# Patient Record
Sex: MALE | Race: WHITE | NOT HISPANIC OR LATINO
[De-identification: names, ages, dates, MRNs, and addresses within clinical notes are randomized per-mention and may not be internally consistent; named-entity substitution may affect disease eponyms.]

---

## 2019-02-11 PROBLEM — Z00.00 ENCOUNTER FOR PREVENTIVE HEALTH EXAMINATION: Status: ACTIVE | Noted: 2019-02-11

## 2019-02-13 ENCOUNTER — APPOINTMENT (OUTPATIENT)
Dept: GASTROENTEROLOGY | Facility: CLINIC | Age: 54
End: 2019-02-13

## 2019-03-04 ENCOUNTER — APPOINTMENT (OUTPATIENT)
Dept: CT IMAGING | Facility: CLINIC | Age: 54
End: 2019-03-04

## 2019-03-04 ENCOUNTER — OUTPATIENT (OUTPATIENT)
Dept: OUTPATIENT SERVICES | Facility: HOSPITAL | Age: 54
LOS: 1 days | End: 2019-03-04
Payer: MEDICARE

## 2019-03-04 DIAGNOSIS — S98.111A COMPLETE TRAUMATIC AMPUTATION OF RIGHT GREAT TOE, INITIAL ENCOUNTER: Chronic | ICD-10-CM

## 2019-03-04 DIAGNOSIS — Z00.8 ENCOUNTER FOR OTHER GENERAL EXAMINATION: ICD-10-CM

## 2019-03-04 DIAGNOSIS — S98.132A COMPLETE TRAUMATIC AMPUTATION OF ONE LEFT LESSER TOE, INITIAL ENCOUNTER: Chronic | ICD-10-CM

## 2019-03-04 PROCEDURE — 74178 CT ABD&PLV WO CNTR FLWD CNTR: CPT

## 2019-03-04 PROCEDURE — 82565 ASSAY OF CREATININE: CPT

## 2019-03-04 PROCEDURE — 74178 CT ABD&PLV WO CNTR FLWD CNTR: CPT | Mod: 26

## 2019-07-31 ENCOUNTER — INPATIENT (INPATIENT)
Facility: HOSPITAL | Age: 54
LOS: 12 days | Discharge: HOME | DRG: 853 | End: 2019-08-13
Attending: HOSPITALIST | Admitting: HOSPITALIST
Payer: MEDICARE

## 2019-07-31 VITALS
HEART RATE: 150 BPM | TEMPERATURE: 100 F | SYSTOLIC BLOOD PRESSURE: 86 MMHG | RESPIRATION RATE: 20 BRPM | OXYGEN SATURATION: 99 % | WEIGHT: 190.04 LBS | DIASTOLIC BLOOD PRESSURE: 54 MMHG | HEIGHT: 71 IN

## 2019-07-31 DIAGNOSIS — S98.111A COMPLETE TRAUMATIC AMPUTATION OF RIGHT GREAT TOE, INITIAL ENCOUNTER: Chronic | ICD-10-CM

## 2019-07-31 DIAGNOSIS — E11.610 TYPE 2 DIABETES MELLITUS WITH DIABETIC NEUROPATHIC ARTHROPATHY: ICD-10-CM

## 2019-07-31 DIAGNOSIS — S98.132A COMPLETE TRAUMATIC AMPUTATION OF ONE LEFT LESSER TOE, INITIAL ENCOUNTER: Chronic | ICD-10-CM

## 2019-07-31 LAB
ALBUMIN SERPL ELPH-MCNC: 2 G/DL — LOW (ref 3.3–5.2)
ALP SERPL-CCNC: 305 U/L — HIGH (ref 40–120)
ALT FLD-CCNC: 13 U/L — SIGNIFICANT CHANGE UP
ANION GAP SERPL CALC-SCNC: 17 MMOL/L — SIGNIFICANT CHANGE UP (ref 5–17)
ANISOCYTOSIS BLD QL: SLIGHT — SIGNIFICANT CHANGE UP
APTT BLD: 31.6 SEC — SIGNIFICANT CHANGE UP (ref 27.5–36.3)
AST SERPL-CCNC: 22 U/L — SIGNIFICANT CHANGE UP
BASOPHILS # BLD AUTO: 0 K/UL — SIGNIFICANT CHANGE UP (ref 0–0.2)
BASOPHILS NFR BLD AUTO: 0 % — SIGNIFICANT CHANGE UP (ref 0–2)
BILIRUB SERPL-MCNC: 0.6 MG/DL — SIGNIFICANT CHANGE UP (ref 0.4–2)
BLD GP AB SCN SERPL QL: SIGNIFICANT CHANGE UP
BUN SERPL-MCNC: 110 MG/DL — HIGH (ref 8–20)
CALCIUM SERPL-MCNC: 8.9 MG/DL — SIGNIFICANT CHANGE UP (ref 8.6–10.2)
CHLORIDE SERPL-SCNC: 88 MMOL/L — LOW (ref 98–107)
CO2 SERPL-SCNC: 18 MMOL/L — LOW (ref 22–29)
CREAT SERPL-MCNC: 2.34 MG/DL — HIGH (ref 0.5–1.3)
EOSINOPHIL # BLD AUTO: 0.19 K/UL — SIGNIFICANT CHANGE UP (ref 0–0.5)
EOSINOPHIL NFR BLD AUTO: 0.9 % — SIGNIFICANT CHANGE UP (ref 0–6)
GIANT PLATELETS BLD QL SMEAR: PRESENT — SIGNIFICANT CHANGE UP
GLUCOSE SERPL-MCNC: 127 MG/DL — HIGH (ref 70–115)
HCT VFR BLD CALC: 27.4 % — LOW (ref 39–50)
HGB BLD-MCNC: 8.3 G/DL — LOW (ref 13–17)
INR BLD: 1.21 RATIO — HIGH (ref 0.88–1.16)
LACTATE BLDV-MCNC: 2.1 MMOL/L — HIGH (ref 0.5–2)
LYMPHOCYTES # BLD AUTO: 0.73 K/UL — LOW (ref 1–3.3)
LYMPHOCYTES # BLD AUTO: 3.5 % — LOW (ref 13–44)
MANUAL SMEAR VERIFICATION: SIGNIFICANT CHANGE UP
MCHC RBC-ENTMCNC: 24.7 PG — LOW (ref 27–34)
MCHC RBC-ENTMCNC: 30.3 GM/DL — LOW (ref 32–36)
MCV RBC AUTO: 81.5 FL — SIGNIFICANT CHANGE UP (ref 80–100)
MONOCYTES # BLD AUTO: 1.44 K/UL — HIGH (ref 0–0.9)
MONOCYTES NFR BLD AUTO: 6.9 % — SIGNIFICANT CHANGE UP (ref 2–14)
NEUTROPHILS # BLD AUTO: 18.55 K/UL — HIGH (ref 1.8–7.4)
NEUTROPHILS NFR BLD AUTO: 88.7 % — HIGH (ref 43–77)
PLAT MORPH BLD: NORMAL — SIGNIFICANT CHANGE UP
PLATELET # BLD AUTO: 410 K/UL — HIGH (ref 150–400)
POLYCHROMASIA BLD QL SMEAR: SLIGHT — SIGNIFICANT CHANGE UP
POTASSIUM SERPL-MCNC: 5.6 MMOL/L — HIGH (ref 3.5–5.3)
POTASSIUM SERPL-MCNC: 7 MMOL/L — CRITICAL HIGH (ref 3.5–5.3)
POTASSIUM SERPL-SCNC: 5.6 MMOL/L — HIGH (ref 3.5–5.3)
POTASSIUM SERPL-SCNC: 7 MMOL/L — CRITICAL HIGH (ref 3.5–5.3)
PROT SERPL-MCNC: 7.4 G/DL — SIGNIFICANT CHANGE UP (ref 6.6–8.7)
PROTHROM AB SERPL-ACNC: 14 SEC — HIGH (ref 10–12.9)
RBC # BLD: 3.36 M/UL — LOW (ref 4.2–5.8)
RBC # FLD: 17 % — HIGH (ref 10.3–14.5)
RBC BLD AUTO: ABNORMAL
SODIUM SERPL-SCNC: 123 MMOL/L — LOW (ref 135–145)
WBC # BLD: 20.91 K/UL — HIGH (ref 3.8–10.5)
WBC # FLD AUTO: 20.91 K/UL — HIGH (ref 3.8–10.5)

## 2019-07-31 PROCEDURE — 93010 ELECTROCARDIOGRAM REPORT: CPT

## 2019-07-31 PROCEDURE — 73610 X-RAY EXAM OF ANKLE: CPT | Mod: 26,RT

## 2019-07-31 PROCEDURE — 73630 X-RAY EXAM OF FOOT: CPT | Mod: 26,RT

## 2019-07-31 PROCEDURE — 99223 1ST HOSP IP/OBS HIGH 75: CPT

## 2019-07-31 PROCEDURE — 99291 CRITICAL CARE FIRST HOUR: CPT

## 2019-07-31 PROCEDURE — 71045 X-RAY EXAM CHEST 1 VIEW: CPT | Mod: 26

## 2019-07-31 RX ORDER — VANCOMYCIN HCL 1 G
1000 VIAL (EA) INTRAVENOUS ONCE
Refills: 0 | Status: COMPLETED | OUTPATIENT
Start: 2019-07-31 | End: 2019-07-31

## 2019-07-31 RX ORDER — DEXTROSE 50 % IN WATER 50 %
25 SYRINGE (ML) INTRAVENOUS ONCE
Refills: 0 | Status: DISCONTINUED | OUTPATIENT
Start: 2019-07-31 | End: 2019-08-01

## 2019-07-31 RX ORDER — ZOLPIDEM TARTRATE 10 MG/1
5 TABLET ORAL AT BEDTIME
Refills: 0 | Status: DISCONTINUED | OUTPATIENT
Start: 2019-07-31 | End: 2019-08-01

## 2019-07-31 RX ORDER — DEXTROSE 50 % IN WATER 50 %
50 SYRINGE (ML) INTRAVENOUS ONCE
Refills: 0 | Status: COMPLETED | OUTPATIENT
Start: 2019-07-31 | End: 2019-07-31

## 2019-07-31 RX ORDER — INSULIN HUMAN 100 [IU]/ML
6 INJECTION, SOLUTION SUBCUTANEOUS ONCE
Refills: 0 | Status: COMPLETED | OUTPATIENT
Start: 2019-07-31 | End: 2019-07-31

## 2019-07-31 RX ORDER — SODIUM HYPOCHLORITE 0.125 %
1 SOLUTION, NON-ORAL MISCELLANEOUS ONCE
Refills: 0 | Status: COMPLETED | OUTPATIENT
Start: 2019-07-31 | End: 2019-07-31

## 2019-07-31 RX ORDER — PIPERACILLIN AND TAZOBACTAM 4; .5 G/20ML; G/20ML
3.38 INJECTION, POWDER, LYOPHILIZED, FOR SOLUTION INTRAVENOUS EVERY 8 HOURS
Refills: 0 | Status: DISCONTINUED | OUTPATIENT
Start: 2019-07-31 | End: 2019-08-01

## 2019-07-31 RX ORDER — INSULIN LISPRO 100/ML
VIAL (ML) SUBCUTANEOUS AT BEDTIME
Refills: 0 | Status: DISCONTINUED | OUTPATIENT
Start: 2019-07-31 | End: 2019-08-01

## 2019-07-31 RX ORDER — SODIUM CHLORIDE 9 MG/ML
1000 INJECTION, SOLUTION INTRAVENOUS
Refills: 0 | Status: DISCONTINUED | OUTPATIENT
Start: 2019-07-31 | End: 2019-08-01

## 2019-07-31 RX ORDER — PIPERACILLIN AND TAZOBACTAM 4; .5 G/20ML; G/20ML
3.38 INJECTION, POWDER, LYOPHILIZED, FOR SOLUTION INTRAVENOUS ONCE
Refills: 0 | Status: COMPLETED | OUTPATIENT
Start: 2019-07-31 | End: 2019-07-31

## 2019-07-31 RX ORDER — VANCOMYCIN HCL 1 G
1000 VIAL (EA) INTRAVENOUS EVERY 24 HOURS
Refills: 0 | Status: DISCONTINUED | OUTPATIENT
Start: 2019-07-31 | End: 2019-08-01

## 2019-07-31 RX ORDER — FENTANYL CITRATE 50 UG/ML
25 INJECTION INTRAVENOUS ONCE
Refills: 0 | Status: DISCONTINUED | OUTPATIENT
Start: 2019-07-31 | End: 2019-08-01

## 2019-07-31 RX ORDER — FOLIC ACID 0.8 MG
1 TABLET ORAL DAILY
Refills: 0 | Status: DISCONTINUED | OUTPATIENT
Start: 2019-07-31 | End: 2019-08-01

## 2019-07-31 RX ORDER — SODIUM CHLORIDE 9 MG/ML
2700 INJECTION INTRAMUSCULAR; INTRAVENOUS; SUBCUTANEOUS ONCE
Refills: 0 | Status: COMPLETED | OUTPATIENT
Start: 2019-07-31 | End: 2019-07-31

## 2019-07-31 RX ORDER — ATORVASTATIN CALCIUM 80 MG/1
40 TABLET, FILM COATED ORAL AT BEDTIME
Refills: 0 | Status: DISCONTINUED | OUTPATIENT
Start: 2019-07-31 | End: 2019-08-01

## 2019-07-31 RX ORDER — INSULIN LISPRO 100/ML
VIAL (ML) SUBCUTANEOUS
Refills: 0 | Status: DISCONTINUED | OUTPATIENT
Start: 2019-07-31 | End: 2019-08-01

## 2019-07-31 RX ORDER — SODIUM CHLORIDE 9 MG/ML
1000 INJECTION INTRAMUSCULAR; INTRAVENOUS; SUBCUTANEOUS ONCE
Refills: 0 | Status: COMPLETED | OUTPATIENT
Start: 2019-07-31 | End: 2019-07-31

## 2019-07-31 RX ORDER — PIPERACILLIN AND TAZOBACTAM 4; .5 G/20ML; G/20ML
3.38 INJECTION, POWDER, LYOPHILIZED, FOR SOLUTION INTRAVENOUS ONCE
Refills: 0 | Status: DISCONTINUED | OUTPATIENT
Start: 2019-07-31 | End: 2019-07-31

## 2019-07-31 RX ORDER — ACETAMINOPHEN 500 MG
975 TABLET ORAL ONCE
Refills: 0 | Status: COMPLETED | OUTPATIENT
Start: 2019-07-31 | End: 2019-07-31

## 2019-07-31 RX ORDER — GLUCAGON INJECTION, SOLUTION 0.5 MG/.1ML
1 INJECTION, SOLUTION SUBCUTANEOUS ONCE
Refills: 0 | Status: DISCONTINUED | OUTPATIENT
Start: 2019-07-31 | End: 2019-08-01

## 2019-07-31 RX ORDER — DEXTROSE 50 % IN WATER 50 %
12.5 SYRINGE (ML) INTRAVENOUS ONCE
Refills: 0 | Status: DISCONTINUED | OUTPATIENT
Start: 2019-07-31 | End: 2019-08-01

## 2019-07-31 RX ORDER — INSULIN GLARGINE 100 [IU]/ML
15 INJECTION, SOLUTION SUBCUTANEOUS AT BEDTIME
Refills: 0 | Status: DISCONTINUED | OUTPATIENT
Start: 2019-07-31 | End: 2019-08-01

## 2019-07-31 RX ORDER — DULOXETINE HYDROCHLORIDE 30 MG/1
20 CAPSULE, DELAYED RELEASE ORAL
Refills: 0 | Status: DISCONTINUED | OUTPATIENT
Start: 2019-07-31 | End: 2019-08-01

## 2019-07-31 RX ORDER — DEXTROSE 50 % IN WATER 50 %
15 SYRINGE (ML) INTRAVENOUS ONCE
Refills: 0 | Status: DISCONTINUED | OUTPATIENT
Start: 2019-07-31 | End: 2019-08-01

## 2019-07-31 RX ORDER — THIAMINE MONONITRATE (VIT B1) 100 MG
100 TABLET ORAL DAILY
Refills: 0 | Status: DISCONTINUED | OUTPATIENT
Start: 2019-07-31 | End: 2019-08-01

## 2019-07-31 RX ADMIN — Medication 1 APPLICATION(S): at 21:10

## 2019-07-31 RX ADMIN — SODIUM CHLORIDE 2700 MILLILITER(S): 9 INJECTION INTRAMUSCULAR; INTRAVENOUS; SUBCUTANEOUS at 19:58

## 2019-07-31 RX ADMIN — PIPERACILLIN AND TAZOBACTAM 200 GRAM(S): 4; .5 INJECTION, POWDER, LYOPHILIZED, FOR SOLUTION INTRAVENOUS at 19:58

## 2019-07-31 NOTE — H&P ADULT - PROBLEM SELECTOR PLAN 3
likely pre-renal in setting of sepsis, now s/p 2.7 L bolus  will check ua, urine lytes  continue to trend

## 2019-07-31 NOTE — H&P ADULT - PROBLEM SELECTOR PLAN 9
hx etoh use and ivdu, however denies recent use  will check drug panel and etoh level  does not appear to have any withdrawal symptoms currently  will treat pain given severity of wound  if etoh level elevated will place on symptom triggered ciwa.

## 2019-07-31 NOTE — H&P ADULT - HISTORY OF PRESENT ILLNESS
54M hx HTN, HLD, DM2, ETOH abuse, IVDU (on suboxone), multiple toe/foot amputations presents with severe foot infection. Patient states that lives on own and hasn't been able to leave house for the last month. He states he's friend with delivery people that bring him his food and meds. He's had multiple foot/toe amputations due to diabetic ulcers. The last one was about two years ago. He's noticed this foot wound brewing for a while, but has not sought medical attention because was concerned that would need amputation and thought could take care of it himself. He states for the last month has not mila able to walk on foot. He states for the past few days he's been having worsening bilateral leg and foot pain and has felt very fatigued. Today his brother came to check on him and upon seeing the foot wound made him call EMS and come to ED.     Pt has history of etoh abuse, but states cut back a lot. He states drinks bottle of champagne a couple times a month and last time being two weeks ago. He also initially denied history of IVDU, however when asked about suboxone subscription he states that he's been on suboxone for last 2-3 years.     Upon arriving to ED, patient was had temp of 99.7, heart rate 150 (improved to 126 s/p ivf bolus), bp 86/54 (improved to 97/54), RR- 22 saturating 95% on room air. Patient was found to have diffuse ulcers of foot and ankle with exposed bones/tendons and infested with maggots. Patient given 2.7 bolus, vanc and zosyn. Podiatry washed out wound.

## 2019-07-31 NOTE — H&P ADULT - NSHPLABSRESULTS_GEN_ALL_CORE
8.3    20.91 )-----------( 410      ( 31 Jul 2019 19:33 )             27.4       07-31    x   |  x   |  x   ----------------------------<  x   5.6<H>   |  x   |  x     Ca    8.9      31 Jul 2019 19:33    TPro  7.4  /  Alb  2.0<L>  /  TBili  0.6  /  DBili  x   /  AST  22  /  ALT  13  /  AlkPhos  305<H>  07-31        PT/INR - ( 31 Jul 2019 19:33 )   PT: 14.0 sec;   INR: 1.21 ratio         PTT - ( 31 Jul 2019 19:33 )  PTT:31.6 sec    Lactate Trend      CARDIAC MARKERS ( 31 Jul 2019 21:07 )  x     / 0.01 ng/mL / 21 U/L / x     / x          CAPILLARY BLOOD GLUCOSE      POCT Blood Glucose.: 135 mg/dL (01 Aug 2019 00:00)      RADIOLOGY, EKG & ADDITIONAL TESTS: Reviewed.   EKG- sinus tachycardia to 126, t wave inversions in v4-6 (appears new from previous in 2016)    CXR- poor inspiratory effort, grossly clear lungs

## 2019-07-31 NOTE — H&P ADULT - NSHPSOCIALHISTORY_GEN_ALL_CORE
former etoh abuse, pt claims cut down a lot 6 months ago and hasn't had drink in 2 weeks  former ivdu on suboxone, states has been clearn for 2-3 years.   Lives alone  unemployed, formerly in construction work

## 2019-07-31 NOTE — CONSULT NOTE ADULT - SUBJECTIVE AND OBJECTIVE BOX
Pt Name: BREANNA MENDOZA    MRN: [id number]      Patient is a 54y Male presenting to the emergency department with right foot gangrene and sepsis. Patient seen and examined at the bedside. Patient states he previously had an ulcer on his right foot which he could not care for the wound and over the course of the past few weeks the right foot wound worsened. Patient is unable to ambulate and reports pain in the RLE. Patient states he has very little movement of the RLE below the knee. Patient admits to fever and chills. Patient denies CP, SOB, dizziness, HA.       REVIEW OF SYSTEMS    General: Alert, responsive    Skin/Breast: +necrosis, +ulcers throughout    Musculoskeletal: SEE HPI.    Neurological: Decreased sensation to distal 2/3 of RLE below knee  Endocrine: Hx of diabetes.    PAST MEDICAL & SURGICAL HISTORY:  PAST MEDICAL & SURGICAL HISTORY:  Hyperlipidemia  HTN (hypertension)  DM (diabetes mellitus)  Amputation of toe, left, traumatic: amputation of all the toes on the left foot  by surgery  Amputation of great toe, right, traumatic: right great toe removed by surgery      Allergies: Allergy Status Unknown      Medications: acetaminophen   Tablet .. 975 milliGRAM(s) Oral once  dextrose 50% Injectable 50 milliLiter(s) IV Push Once  fentaNYL    Injectable 25 MICROGram(s) IV Push once  insulin regular  human recombinant. 6 Unit(s) IV Push once  piperacillin/tazobactam IVPB.. 3.375 Gram(s) IV Intermittent Once  sodium chloride 0.9% Bolus 1000 milliLiter(s) IV Bolus once  vancomycin  IVPB 1000 milliGRAM(s) IV Intermittent once      FAMILY HISTORY:  : non-contributory    Social History:     Ambulation: Walking independently [ ] With Cane [ ] With Walker [ ]  Bedbound [ ]                           8.3    20.91 )-----------( 410      ( 31 Jul 2019 19:33 )             27.4       07-31    x   |  x   |  x   ----------------------------<  x   5.6<H>   |  x   |  x     Ca    8.9      31 Jul 2019 19:33    TPro  7.4  /  Alb  2.0<L>  /  TBili  0.6  /  DBili  x   /  AST  22  /  ALT  13  /  AlkPhos  305<H>  07-31      Vital Signs Last 24 Hrs  T(C): 37.6 (31 Jul 2019 18:47), Max: 37.6 (31 Jul 2019 18:47)  T(F): 99.7 (31 Jul 2019 18:47), Max: 99.7 (31 Jul 2019 18:47)  HR: 122 (31 Jul 2019 23:27) (122 - 150)  BP: 97/54 (31 Jul 2019 19:10) (86/54 - 97/54)  BP(mean): --  RR: 22 (31 Jul 2019 19:10) (20 - 22)  SpO2: 99% (31 Jul 2019 19:10) (99% - 99%)    Daily Height in cm: 180.34 (31 Jul 2019 18:47)    Daily       PHYSICAL EXAM:      Appearance: Alert, responsive  Skin: + multiple ulcers to right foot. Skin breakdown to bone/tendon present along posteriolateral ankle. + maggots to deep bone and tendons throughout distal digits. Noted dry necrosis along distal lateral foot. Vascular: DP pulse Non palpable. Neurological: Noted decreased sensation to distal 2/3 of RLE below the knee, SILT from proximal 1/3 below knee to proximal extremity. Musculoskeletal: Right Lower Extremity: Exam difficult to assess secondary to pain. + minimal dorsi/plantar flexion noted with pain. Patient able to range at the hip and knee with pain to distal RLE. Patient unable to move distal digits.     A/P:  Pt is a  54y Male with gangrene right foot     PLAN:   - Admit to medicine  - NPO for OR tomorrow  - IV fluids ordered and to start once NPO  - Pre-operative ABX ordered  - Medical clearance requested for procedure  - Bed rest  - IV abx   - Continue care as per primary team  - Case discussed with Dr. Marcano/Dr. Valdovinos Pt Name: BREANNA MENDOZA    MRN: [id number]      Patient is a 54y Male presenting to the emergency department with right foot gangrene and sepsis. Patient seen and examined at the bedside. Patient states he previously had an ulcer on his right foot which he could not care for the wound and over the course of the past few months the right foot wound worsened. Patient is unable to ambulate and reports pain in the RLE. Patient states he has very little movement of the RLE below the knee. Patient denies fever/chills, CP, SOB, dizziness, HA.       REVIEW OF SYSTEMS    General: Alert, responsive    Skin/Breast: +necrosis, +ulcers throughout    Musculoskeletal: SEE HPI.    Neurological: Decreased sensation to distal 2/3 of RLE below knee  Endocrine: Hx of diabetes.    PAST MEDICAL & SURGICAL HISTORY:  PAST MEDICAL & SURGICAL HISTORY:  Hyperlipidemia  HTN (hypertension)  DM (diabetes mellitus)  Amputation of toe, left, traumatic: amputation of all the toes on the left foot  by surgery  Amputation of great toe, right, traumatic: right great toe removed by surgery      Allergies: Allergy Status Unknown      Medications: acetaminophen   Tablet .. 975 milliGRAM(s) Oral once  dextrose 50% Injectable 50 milliLiter(s) IV Push Once  fentaNYL    Injectable 25 MICROGram(s) IV Push once  insulin regular  human recombinant. 6 Unit(s) IV Push once  piperacillin/tazobactam IVPB.. 3.375 Gram(s) IV Intermittent Once  sodium chloride 0.9% Bolus 1000 milliLiter(s) IV Bolus once  vancomycin  IVPB 1000 milliGRAM(s) IV Intermittent once      FAMILY HISTORY:  : non-contributory    Social History:     Ambulation: Walking independently [ ] With Cane [ ] With Walker [ ]  Bedbound [ ]                           8.3    20.91 )-----------( 410      ( 31 Jul 2019 19:33 )             27.4       07-31    x   |  x   |  x   ----------------------------<  x   5.6<H>   |  x   |  x     Ca    8.9      31 Jul 2019 19:33    TPro  7.4  /  Alb  2.0<L>  /  TBili  0.6  /  DBili  x   /  AST  22  /  ALT  13  /  AlkPhos  305<H>  07-31      Vital Signs Last 24 Hrs  T(C): 37.6 (31 Jul 2019 18:47), Max: 37.6 (31 Jul 2019 18:47)  T(F): 99.7 (31 Jul 2019 18:47), Max: 99.7 (31 Jul 2019 18:47)  HR: 122 (31 Jul 2019 23:27) (122 - 150)  BP: 97/54 (31 Jul 2019 19:10) (86/54 - 97/54)  BP(mean): --  RR: 22 (31 Jul 2019 19:10) (20 - 22)  SpO2: 99% (31 Jul 2019 19:10) (99% - 99%)    Daily Height in cm: 180.34 (31 Jul 2019 18:47)    Daily       PHYSICAL EXAM:      Appearance: Alert, responsive  Skin: + multiple ulcers to right foot. Skin breakdown to bone/tendon present along posteriolateral ankle. + maggots to deep bone and tendons throughout distal digits. Noted dry necrosis along distal lateral foot. Vascular: DP pulse Non palpable. Neurological: Noted decreased sensation to distal 2/3 of RLE below the knee, SILT from proximal 1/3 below knee to proximal extremity. Musculoskeletal: Right Lower Extremity: Exam difficult to assess secondary to pain. + minimal dorsi/plantar flexion noted with pain. Patient able to range at the hip and knee with pain to distal RLE. Patient unable to move distal digits.     A/P:  Pt is a  54y Male with gangrene right foot     PLAN:   - Admit to medicine  - NPO for OR tomorrow  - IV fluids ordered and to start once NPO  - Pre-operative ABX ordered  - Medical clearance requested for procedure  - Bed rest  - IV abx   - Continue care as per primary team  - Case discussed with Dr. Marcano/Dr. Valdovinos Pt Name: BREANNA MENDOZA    MRN: [id number]      Patient is a 54y Male presenting to the emergency department with right foot gangrene and sepsis. Patient seen and examined at the bedside. Patient states he previously had an ulcer on his right foot which he could not care for the wound and over the course of the past few months the right foot wound worsened. Patient is unable to ambulate and reports pain in the RLE. Patient states he has very little movement of the RLE below the knee. Patient denies fever/chills, CP, SOB, dizziness, HA.       REVIEW OF SYSTEMS    General: Alert, responsive    Skin/Breast: +necrosis, +ulcers throughout    Musculoskeletal: SEE HPI.    Neurological: Decreased sensation to distal 2/3 of RLE below knee  Endocrine: Hx of diabetes.    PAST MEDICAL & SURGICAL HISTORY:  PAST MEDICAL & SURGICAL HISTORY:  Hyperlipidemia  HTN (hypertension)  DM (diabetes mellitus)  Amputation of toe, left, traumatic: amputation of all the toes on the left foot  by surgery  Amputation of great toe, right, traumatic: right great toe removed by surgery      Allergies: Allergy Status Unknown      Medications: acetaminophen   Tablet .. 975 milliGRAM(s) Oral once  dextrose 50% Injectable 50 milliLiter(s) IV Push Once  fentaNYL    Injectable 25 MICROGram(s) IV Push once  insulin regular  human recombinant. 6 Unit(s) IV Push once  piperacillin/tazobactam IVPB.. 3.375 Gram(s) IV Intermittent Once  sodium chloride 0.9% Bolus 1000 milliLiter(s) IV Bolus once  vancomycin  IVPB 1000 milliGRAM(s) IV Intermittent once      FAMILY HISTORY:  : non-contributory    Social History:     Ambulation: Walking independently [ ] With Cane [ ] With Walker [ ]  Bedbound [ ]                           8.3    20.91 )-----------( 410      ( 31 Jul 2019 19:33 )             27.4       07-31    x   |  x   |  x   ----------------------------<  x   5.6<H>   |  x   |  x     Ca    8.9      31 Jul 2019 19:33    TPro  7.4  /  Alb  2.0<L>  /  TBili  0.6  /  DBili  x   /  AST  22  /  ALT  13  /  AlkPhos  305<H>  07-31      Vital Signs Last 24 Hrs  T(C): 37.6 (31 Jul 2019 18:47), Max: 37.6 (31 Jul 2019 18:47)  T(F): 99.7 (31 Jul 2019 18:47), Max: 99.7 (31 Jul 2019 18:47)  HR: 122 (31 Jul 2019 23:27) (122 - 150)  BP: 97/54 (31 Jul 2019 19:10) (86/54 - 97/54)  BP(mean): --  RR: 22 (31 Jul 2019 19:10) (20 - 22)  SpO2: 99% (31 Jul 2019 19:10) (99% - 99%)    Daily Height in cm: 180.34 (31 Jul 2019 18:47)    Daily       PHYSICAL EXAM:      Appearance: Alert, responsive  Skin: + multiple ulcers to right foot. Skin breakdown to bone/tendon present along posteriolateral ankle. + maggots to deep bone and tendons throughout distal digits. Noted dry necrosis along distal lateral foot. No active drainage or discharge. Vascular: DP pulse Non palpable. Neurological: Noted decreased sensation to distal 2/3 of RLE below the knee, SILT from proximal 1/3 below knee to proximal extremity. Musculoskeletal: Right Lower Extremity: Exam difficult to assess secondary to pain. + minimal dorsi/plantar flexion noted with pain. Patient able to range at the hip and knee with pain to distal RLE. Patient unable to move distal digits.     A/P:  Pt is a  54y Male with gangrene right foot     PLAN:   - Admit to medicine  - NPO for OR tomorrow  - IV fluids ordered and to start once NPO  - Pre-operative ABX ordered  - Medical clearance requested for procedure  - Bed rest  - IV abx   - Continue care as per primary team  - Case discussed with Dr. Marcano/Dr. Valdovinos Pt Name: BREANNA MENDOZA    MRN: [id number]      Patient is a 54y Male presenting to the emergency department with right foot gangrene and sepsis. Patient seen and examined at the bedside. Patient states he previously had an ulcer on his right foot which he could not care for the wound and over the course of the past few months the right foot wound worsened. Patient is unable to ambulate and reports pain in the RLE. Patient states he has very little movement of the RLE below the knee. Patient denies fever/chills, CP, SOB, dizziness, HA.       REVIEW OF SYSTEMS    General: Alert, responsive    Skin/Breast: +necrosis, +ulcers throughout    Musculoskeletal: SEE HPI.    Neurological: Decreased sensation to distal 2/3 of RLE below knee  Endocrine: Hx of diabetes.    PAST MEDICAL & SURGICAL HISTORY:  PAST MEDICAL & SURGICAL HISTORY:  Hyperlipidemia  HTN (hypertension)  DM (diabetes mellitus)  Amputation of toe, left, traumatic: amputation of all the toes on the left foot  by surgery  Amputation of great toe, right, traumatic: right great toe removed by surgery      Allergies: Allergy Status Unknown      Medications: acetaminophen   Tablet .. 975 milliGRAM(s) Oral once  dextrose 50% Injectable 50 milliLiter(s) IV Push Once  fentaNYL    Injectable 25 MICROGram(s) IV Push once  insulin regular  human recombinant. 6 Unit(s) IV Push once  piperacillin/tazobactam IVPB.. 3.375 Gram(s) IV Intermittent Once  sodium chloride 0.9% Bolus 1000 milliLiter(s) IV Bolus once  vancomycin  IVPB 1000 milliGRAM(s) IV Intermittent once      FAMILY HISTORY:  : non-contributory    Social History:     Ambulation: Walking independently [ ] With Cane [ ] With Walker [ ]  Bedbound [ ]                           8.3    20.91 )-----------( 410      ( 31 Jul 2019 19:33 )             27.4       07-31    x   |  x   |  x   ----------------------------<  x   5.6<H>   |  x   |  x     Ca    8.9      31 Jul 2019 19:33    TPro  7.4  /  Alb  2.0<L>  /  TBili  0.6  /  DBili  x   /  AST  22  /  ALT  13  /  AlkPhos  305<H>  07-31      Vital Signs Last 24 Hrs  T(C): 37.6 (31 Jul 2019 18:47), Max: 37.6 (31 Jul 2019 18:47)  T(F): 99.7 (31 Jul 2019 18:47), Max: 99.7 (31 Jul 2019 18:47)  HR: 122 (31 Jul 2019 23:27) (122 - 150)  BP: 97/54 (31 Jul 2019 19:10) (86/54 - 97/54)  BP(mean): --  RR: 22 (31 Jul 2019 19:10) (20 - 22)  SpO2: 99% (31 Jul 2019 19:10) (99% - 99%)    Daily Height in cm: 180.34 (31 Jul 2019 18:47)    Daily       PHYSICAL EXAM:      Appearance: Alert, responsive  Skin: + multiple ulcers to right foot. Skin breakdown to bone/tendon present along posteriolateral ankle. + maggots to deep bone and tendons throughout distal digits. Noted dry necrosis along distal lateral foot. No active drainage or discharge. Vascular: DP pulse Non palpable. Neurological: Noted decreased sensation to distal 2/3 of RLE below the knee, SILT from proximal 1/3 below knee to proximal extremity. Musculoskeletal: Right Lower Extremity: Exam difficult to assess secondary to pain. + minimal dorsi/plantar flexion noted with pain. Patient able to range at the hip and knee with pain to distal RLE. Patient unable to move distal digits. Right foot, irrigated with copious amount of sterile saline, wrapped in kerlix with multiple abd pads over RLE wounds.     A/P:  Pt is a  54y Male with gangrene right foot     PLAN:   - Admit to medicine  - NPO for OR tomorrow  - IV fluids ordered and to start once NPO  - Pre-operative ABX ordered  - Medical clearance requested for procedure  - Bed rest  - IV abx   - Continue care as per primary team  - Case discussed with Dr. Marcano/Dr. Valdovinos Pt Name: BREANNA MENDOZA    MRN: [id number]      Patient is a 54y Male presenting to the emergency department with right foot gangrene and sepsis. Patient seen and examined at the bedside. Patient states he previously had an ulcer on his right foot which he could not care for the wound and over the course of the past few months the right foot wound worsened. Patient is unable to ambulate and reports pain in the RLE. Patient states he has very little movement of the RLE below the knee. Patient denies fever/chills, CP, SOB, dizziness, HA.       REVIEW OF SYSTEMS    General: Alert, responsive    Skin/Breast: +necrosis, +ulcers throughout    Musculoskeletal: SEE HPI.    Neurological: Decreased sensation to distal 2/3 of RLE below knee  Endocrine: Hx of diabetes.    PAST MEDICAL & SURGICAL HISTORY:  PAST MEDICAL & SURGICAL HISTORY:  Hyperlipidemia  HTN (hypertension)  DM (diabetes mellitus)  Amputation of toe, left, traumatic: amputation of all the toes on the left foot  by surgery  Amputation of great toe, right, traumatic: right great toe removed by surgery      Allergies: Allergy Status Unknown      Medications: acetaminophen   Tablet .. 975 milliGRAM(s) Oral once  dextrose 50% Injectable 50 milliLiter(s) IV Push Once  fentaNYL    Injectable 25 MICROGram(s) IV Push once  insulin regular  human recombinant. 6 Unit(s) IV Push once  piperacillin/tazobactam IVPB.. 3.375 Gram(s) IV Intermittent Once  sodium chloride 0.9% Bolus 1000 milliLiter(s) IV Bolus once  vancomycin  IVPB 1000 milliGRAM(s) IV Intermittent once      FAMILY HISTORY:  : non-contributory    Social History:     Ambulation: Walking independently [ ] With Cane [ ] With Walker [ ]  Bedbound [ ]                           8.3    20.91 )-----------( 410      ( 31 Jul 2019 19:33 )             27.4       07-31    x   |  x   |  x   ----------------------------<  x   5.6<H>   |  x   |  x     Ca    8.9      31 Jul 2019 19:33    TPro  7.4  /  Alb  2.0<L>  /  TBili  0.6  /  DBili  x   /  AST  22  /  ALT  13  /  AlkPhos  305<H>  07-31      Vital Signs Last 24 Hrs  T(C): 37.6 (31 Jul 2019 18:47), Max: 37.6 (31 Jul 2019 18:47)  T(F): 99.7 (31 Jul 2019 18:47), Max: 99.7 (31 Jul 2019 18:47)  HR: 122 (31 Jul 2019 23:27) (122 - 150)  BP: 97/54 (31 Jul 2019 19:10) (86/54 - 97/54)  BP(mean): --  RR: 22 (31 Jul 2019 19:10) (20 - 22)  SpO2: 99% (31 Jul 2019 19:10) (99% - 99%)    Daily Height in cm: 180.34 (31 Jul 2019 18:47)    Daily       PHYSICAL EXAM:      Appearance: Alert, responsive  Skin: + multiple ulcers to right foot. Skin breakdown to bone/tendon present along posteriolateral ankle. + maggots to deep bone and tendons throughout distal digits. Noted dry necrosis along distal lateral foot. No active drainage or discharge. Vascular: DP pulse Non palpable. Neurological: Noted decreased sensation to distal 2/3 of RLE below the knee, SILT from proximal 1/3 below knee to proximal extremity. Musculoskeletal: Right Lower Extremity: Exam difficult to assess secondary to pain. + minimal dorsi/plantar flexion noted with pain. Patient able to range at the hip and knee with pain to distal RLE. Patient unable to move distal digits. Right foot, irrigated with copious amount of sterile saline, wrapped in kerlix with multiple abd pads over RLE wounds.     A/P:  Pt is a  54y Male with gangrene right foot     PLAN:   - Admit to medicine  - NPO for OR tomorrow  - IV fluids ordered and to start once NPO  - Pre-operative ABX ordered  - Medical clearance requested for procedure  - Bed rest  - Recc ID consult  - IV abx   - Continue care as per primary team  - Case discussed with Dr. Marcano/Dr. Valdovinos Pt Name: BREANNA MENDOZA    MRN: [id number]      Patient is a 54y Male presenting to the emergency department with right foot gangrene and sepsis. Patient seen and examined at the bedside. Patient states he previously had an ulcer on his right foot which he could not care for the wound and over the course of the past few months the right foot wound worsened. Patient is unable to ambulate and reports pain in the RLE. Patient states he has very little movement of the RLE below the knee. Patient denies fever/chills, CP, SOB, dizziness, HA.       REVIEW OF SYSTEMS    General: Alert, responsive    Skin/Breast: +necrosis, +ulcers throughout    Musculoskeletal: SEE HPI.    Neurological: Decreased sensation to distal 2/3 of RLE below knee  Endocrine: Hx of diabetes.    PAST MEDICAL & SURGICAL HISTORY:  PAST MEDICAL & SURGICAL HISTORY:  Hyperlipidemia  HTN (hypertension)  DM (diabetes mellitus)  Amputation of toe, left, traumatic: amputation of all the toes on the left foot  by surgery  Amputation of great toe, right, traumatic: right great toe removed by surgery      Allergies: Allergy Status Unknown      Medications: acetaminophen   Tablet .. 975 milliGRAM(s) Oral once  dextrose 50% Injectable 50 milliLiter(s) IV Push Once  fentaNYL    Injectable 25 MICROGram(s) IV Push once  insulin regular  human recombinant. 6 Unit(s) IV Push once  piperacillin/tazobactam IVPB.. 3.375 Gram(s) IV Intermittent Once  sodium chloride 0.9% Bolus 1000 milliLiter(s) IV Bolus once  vancomycin  IVPB 1000 milliGRAM(s) IV Intermittent once      FAMILY HISTORY:  : non-contributory    Social History:     Ambulation: Walking independently [ ] With Cane [ ] With Walker [ ]  Bedbound [ ]                           8.3    20.91 )-----------( 410      ( 31 Jul 2019 19:33 )             27.4       07-31    x   |  x   |  x   ----------------------------<  x   5.6<H>   |  x   |  x     Ca    8.9      31 Jul 2019 19:33    TPro  7.4  /  Alb  2.0<L>  /  TBili  0.6  /  DBili  x   /  AST  22  /  ALT  13  /  AlkPhos  305<H>  07-31      Vital Signs Last 24 Hrs  T(C): 37.6 (31 Jul 2019 18:47), Max: 37.6 (31 Jul 2019 18:47)  T(F): 99.7 (31 Jul 2019 18:47), Max: 99.7 (31 Jul 2019 18:47)  HR: 122 (31 Jul 2019 23:27) (122 - 150)  BP: 97/54 (31 Jul 2019 19:10) (86/54 - 97/54)  BP(mean): --  RR: 22 (31 Jul 2019 19:10) (20 - 22)  SpO2: 99% (31 Jul 2019 19:10) (99% - 99%)    Daily Height in cm: 180.34 (31 Jul 2019 18:47)    Daily       PHYSICAL EXAM:      Appearance: Alert, responsive  Skin: + multiple ulcers to right foot. Skin breakdown to bone/tendon present along posteriolateral ankle. + maggots to deep bone and tendons throughout distal digits. Noted dry necrosis along distal lateral foot. No active drainage or discharge. Vascular: DP pulse Non palpable. Neurological: Noted decreased sensation to distal 2/3 of RLE below the knee, SILT from proximal 1/3 below knee to proximal extremity. Musculoskeletal: Right Lower Extremity: Exam difficult to assess secondary to pain. + minimal dorsi/plantar flexion noted with pain. Patient able to range at the hip and knee with pain to distal RLE. Patient unable to move distal digits. Right foot, irrigated with copious amount of sterile saline, wrapped in kerlix with multiple abd pads over RLE wounds.     A/P:  Pt is a  54y Male with gangrene right foot     PLAN:   - Admit to medicine  - Recc ID consult  - IV abx   - Continue care as per primary team  - Case discussed with Dr. Marcano/Dr. Valdovinos

## 2019-07-31 NOTE — H&P ADULT - PROBLEM SELECTOR PLAN 4
likely 2/2 poor po intake/nutrition  will check urine osmolality  s/p 2.7 L bolus  goal correction of 8-10 meq in 24 hours  will continue to monitor closely likely 2/2 poor po intake/nutrition  will check urine osmolality  s/p 2.7 L bolus by ed  goal correction of 8-10 meq in 24 hours  will continue to monitor closely

## 2019-07-31 NOTE — ED PROVIDER NOTE - MUSCULOSKELETAL, MLM
L foot all toes amputated, ulcer at L foot. R foot muscle, tendons, bones exposed at different areas. Necrotic areas at the distal foot s/p four toe amputations, infested with maggots, tender to palpation. spine appears normal, FROM. no spine tenderness. L foot all toes amputated, ulcer at L foot. R foot muscle, tendons, bones exposed at different areas. Necrotic areas at the distal R foot s/p four toe amputation, infested with maggots, tender to palpation.

## 2019-07-31 NOTE — H&P ADULT - NSHPPHYSICALEXAM_GEN_ALL_CORE
Vital Signs Last 24 Hrs  T(C): 37.6 (31 Jul 2019 18:47), Max: 37.6 (31 Jul 2019 18:47)  T(F): 99.7 (31 Jul 2019 18:47), Max: 99.7 (31 Jul 2019 18:47)  HR: 122 (31 Jul 2019 23:27) (122 - 150)  BP: 97/54 (31 Jul 2019 19:10) (86/54 - 97/54)  BP(mean): --  RR: 22 (31 Jul 2019 19:10) (20 - 22)  SpO2: 99% (31 Jul 2019 19:10) (99% - 99%)    GENERAL: mild distress 2/2 pain  HEENT:  Atraumatic, Normocephalic, MMM, no oropharyngeal lesions  EYES: EOMI, PERRLA, conjunctiva and sclera clear  NECK: Supple, No JVD, no throat tenderness.  CHEST/LUNG: Clear to auscultation bilaterally; No wheezes, rales, or rhonchi  HEART: sinus tachycardia; No murmurs, rubs, or gallops  ABDOMEN: mild diffuse tenderness in all quadrants. no guarding or rebound, normal bs  EXTREMITIES:  no edema, in legs bilaterally, bilateral popliteal pulses appeciated, can't appreciate lower pulses 2/2 ulcers. pain with movement of bilateral extremities.   PSYCH: AAOx3, depressed mood  NEUROLOGY: moves all extremities spontaneously. unable to feel sensation on bilateral foot, sensation grossly intact above ankles bilaterally  SKIN: severely ulcerated right foot/ankles with multiple exposed bones/tendons and necrotic tissues, maggots deep in wound.  Mild L foot ulcer on bottom. Vital Signs Last 24 Hrs  T(C): 37.6 (31 Jul 2019 18:47), Max: 37.6 (31 Jul 2019 18:47)  T(F): 99.7 (31 Jul 2019 18:47), Max: 99.7 (31 Jul 2019 18:47)  HR: 122 (31 Jul 2019 23:27) (122 - 150)  BP: 97/54 (31 Jul 2019 19:10) (86/54 - 97/54)  BP(mean): --  RR: 22 (31 Jul 2019 19:10) (20 - 22)  SpO2: 99% (31 Jul 2019 19:10) (99% - 99%)    GENERAL: mild distress 2/2 pain  HEENT:  Atraumatic, Normocephalic, MMM, no oropharyngeal lesions  EYES: EOMI, PERRLA, conjunctiva and sclera clear  NECK: Supple, No JVD, no throat tenderness.  CHEST/LUNG: Clear to auscultation bilaterally; No wheezes, rales, or rhonchi  HEART: sinus tachycardia; No murmurs, rubs, or gallops  ABDOMEN: mild diffuse tenderness in all quadrants. no guarding or rebound, normal bs  EXTREMITIES:  no edema, in legs bilaterally, bilateral popliteal pulses appeciated, can't appreciate lower pulses 2/2 ulcers. pain with movement of bilateral extremities.   PSYCH: AAOx3, depressed mood  NEUROLOGY: moves all extremities spontaneously. unable to feel sensation on bilateral foot, sensation grossly intact above ankles bilaterally  SKIN: severely ulcerated right foot/ankles with multiple exposed bones/tendons and necrotic tissues, maggots deep in wound.  Mild L foot ulcer on bottom.  bilateral erythema intertriginous area of groin with no obvious no fluctuance, abscess or drainage

## 2019-07-31 NOTE — ED PROVIDER NOTE - CROS ED NEURO NEG
no headache/no change in level of consciousness/focal neuro deficits LOC or focal neuro deficits/no headache

## 2019-07-31 NOTE — H&P ADULT - NSICDXPASTSURGICALHX_GEN_ALL_CORE_FT
PAST SURGICAL HISTORY:  Amputation of great toe, right, traumatic right great toe removed by surgery    Amputation of toe, left, traumatic amputation of all the toes on the left foot  by surgery

## 2019-07-31 NOTE — H&P ADULT - PROBLEM SELECTOR PLAN 1
severe right foot wound with ulcer to bone, necrotic tissue and maggots causing severe sepsis  podiatry assessed and washed wound out, foot is not salvageable  Ortho consulted for BKA, requesting medical clearance  Given multiple electrolyte abnormalities, sepsis, ostero and anemia cannot clear patient for surgery at this time  If patient has improvement of electrolytes and vitals, as well as stable hemoglobin in am will reassess patient for clearance tomorrow morning  will continue patient on zosyn and vancomycin (check level)  follow up blood cultures  ortho and podiatry recs appreciated  ID consult in am

## 2019-07-31 NOTE — H&P ADULT - NSHPREVIEWOFSYSTEMS_GEN_ALL_CORE
REVIEW OF SYSTEMS:    CONSTITUTIONAL: No fevers or chills. +weakness  EYES/ENT: No visual changes;  No vertigo or throat pain   NECK: No pain or stiffness  RESPIRATORY: No cough, wheezing, hemoptysis; No shortness of breath  CARDIOVASCULAR: No chest pain or palpitations  GASTROINTESTINAL: +abdominal pain No nausea, vomiting, or hematemesis; No diarrhea or constipation. No melena or hematochezia.  GENITOURINARY: No dysuria, frequency or hematuria  NEUROLOGICAL: numbness of b/l feet. No motor deficits.   SKIN: see HPI  All other review of systems is negative unless indicated above.

## 2019-07-31 NOTE — CONSULT NOTE ADULT - SUBJECTIVE AND OBJECTIVE BOX
Patient is a 54y old  Male who presents with a chief complaint of right foot gangrene and sepsis     HPI: Pt seen bedside in ED. Pt states he has had ulcer for months on right foot and has not been taking care of it. Cannot remember when he last saw PCP or podiatrist. Non-compliant with medication. Pt states that he is in a lot of pain in his right foot. Admits to f/c and denies n/v/sob.       PMH: Hyperlipidemia  HTN (hypertension)  DM (diabetes mellitus)    Allergies: Allergy Status Unknown    Medications: acetaminophen   Tablet .. 975 milliGRAM(s) Oral once  Dakins Solution - 1/2 Strength 1 Application(s) Topical Once  dextrose 50% Injectable 50 milliLiter(s) IV Push Once  insulin regular  human recombinant. 6 Unit(s) IV Push once  vancomycin  IVPB 1000 milliGRAM(s) IV Intermittent once    FH:  PSX: Amputation of toe, left, traumatic  Amputation of great toe, right, traumatic    SH: acetaminophen   Tablet .. 975 milliGRAM(s) Oral once  Dakins Solution - 1/2 Strength 1 Application(s) Topical Once  dextrose 50% Injectable 50 milliLiter(s) IV Push Once  insulin regular  human recombinant. 6 Unit(s) IV Push once  vancomycin  IVPB 1000 milliGRAM(s) IV Intermittent once      Vital Signs Last 24 Hrs  T(C): 37.6 (31 Jul 2019 18:47), Max: 37.6 (31 Jul 2019 18:47)  T(F): 99.7 (31 Jul 2019 18:47), Max: 99.7 (31 Jul 2019 18:47)  HR: 148 (31 Jul 2019 19:10) (148 - 150)  BP: 97/54 (31 Jul 2019 19:10) (86/54 - 97/54)  BP(mean): --  RR: 22 (31 Jul 2019 19:10) (20 - 22)  SpO2: 99% (31 Jul 2019 19:10) (99% - 99%)    LABS                        8.3    20.91 )-----------( 410      ( 31 Jul 2019 19:33 )             27.4               07-31    x   |  x   |  x   ----------------------------<  x   5.6<H>   |  x   |  x     Ca    8.9      31 Jul 2019 19:33    TPro  7.4  /  Alb  2.0<L>  /  TBili  0.6  /  DBili  x   /  AST  22  /  ALT  13  /  AlkPhos  305<H>  07-31      ROS  REVIEW OF SYSTEMS:    CONSTITUTIONAL: No fever, weight loss, or fatigue  EYES: No eye pain, visual disturbances, or discharge  ENMT:  No difficulty hearing, tinnitus, vertigo; No sinus or throat pain  NECK: No pain or stiffness  BREASTS: No pain, masses, or nipple discharge  RESPIRATORY: No cough, wheezing, chills or hemoptysis; No shortness of breath  CARDIOVASCULAR: No chest pain, palpitations, dizziness, or leg swelling  GASTROINTESTINAL: No abdominal or epigastric pain. No nausea, vomiting, or hematemesis; No diarrhea or constipation. No melena or hematochezia.  GENITOURINARY: No dysuria, frequency, hematuria, or incontinence  NEUROLOGICAL: No headaches, memory loss, loss of strength, numbness, or tremors  SKIN: No itching, burning, rashes, or lesions   LYMPH NODES: No enlarged glands  ENDOCRINE: No heat or cold intolerance; No hair loss  MUSCULOSKELETAL: No joint pain or swelling; No muscle, back, or extremity pain  PSYCHIATRIC: No depression, anxiety, mood swings, or difficulty sleeping  HEME/LYMPH: No easy bruising, or bleeding gums  ALLERY AND IMMUNOLOGIC: No hives or eczema      PHYSICAL EXAM  GEN: BREANNA MENDOZA is a pleasant well-nourished, well developed 54y Male in no acute distress, alert awake, and oriented to person, place and time.   LE Focused:    Vasc:  DP/PT nonpalpable b/l, unable to determine CFT, edema and gangrenous changes to right foot  Derm: multiple ulcers to right foot down to tendon and bone at forefoot, lateral foot, medial and lateral ankle, with maggots deep to tendon and bone throughout entire foot and ankle, necrotic patches mainly at distal and lateral foot. Left foot plantar ulcer ~7cm x 5cm with fibrogranular base    Neuro: Protective sensation to digits   MSK: TMA left foot     Foot and ankle xray pending    A:  Gangrene to R got and ankle with maggots present  Left foot ulcer     P:  Patient evaluates, chart reviewed  Xrays pending  Flushed out maggots with 3 bottles of Dakins and 2L of sterile saline  Dressed R foot with Dakins soaked gauze, ABD, Kirlix and ACE  Dressed L foot with betadine DSD  Recommend IV abx  Right foot is not salvageable - recommend Consult with Vascular or Ortho for Right BKA  Discussed treatment and plan with patient   Podiatry will follow while in house for left foot ulcer  Discussed with attending Dr. Hassan

## 2019-07-31 NOTE — H&P ADULT - PROBLEM SELECTOR PLAN 6
patient with hemoglobin of 8.2, last recorded hgb in system is 12.9  pt denies brbpr or melena. Denies excessive bleeding from foot wound  Patient did state had some hematuria last few days, will check UA  based on elevated ferritin with decreased tibc, transferrin suspect may be anemia 2/2 chronic inflammation  continue to trend, tranfuse <8 given pending surgery

## 2019-07-31 NOTE — ED PROVIDER NOTE - OBJECTIVE STATEMENT
55 y/o M pt with significant PMHx of DM, HTN, and HLD presents to the ED c/o bilateral gangrene that began months ago. EMS found him in his home and it is unknown when he was last outside his home. Pt says that he last walked a month ago. His last blood sugar level was 130. Pt does not know when took Insulin. Pt denies fevers/chills, ha, loc, focal neuro deficits, cp/sob/palp, cough, abd pain/n/v/d, urinary symptoms, recent travel and sick contacts. 55 y/o M pt with significant PMHx of DM, HTN, and HLD presents to the ED c/o bilateral gangrene that began months ago. EMS found him in his home and it is unknown when he was last outside his home. Pt says that he last walked a month ago. His last blood sugar level was 130. Pt does not know when took Insulin. Pt did not take any Tylenol. Pt denies fevers/chills, ha, loc, focal neuro deficits, cp/sob/palp, cough, abd pain/n/v/d, urinary symptoms, recent travel and sick contacts. 55 y/o M pt with significant PMHx of DM, HTN, and HLD presents to the ED BIBA c/o bilateral Lower extremity gangrene that began months ago. Reports KRISTIAN foot pain. EMS reported that his boss went to his house and found him prompting the call EMS. His boss noted that he is has not seen the pt in months. Pt says that he last walked a month ago. He is noncompliant with his medications and is unaware of the last time he took his Insulin. Pt did not take any Tylenol for his symptoms. Pt denies fevers/chills, ha, loc, focal neuro deficits, cp/sob/palp, cough, abd pain/n/v/d, urinary symptoms, recent travel and sick contacts. No further complaints at this time. 55 y/o M pt with significant PMHx of DM, HTN, and HLD presents to the ED BIBA c/o bilateral Lower extremity gangrene that began months ago. Reports KRISTIAN foot pain. EMS reported that his boss went to his house and found him prompting the call EMS. His boss noted that he is has not seen the pt in months. Pt says that he last walked a month ago. He is noncompliant with his medications and is unaware of the last time he took his Insulin. Pt did not take any Tylenol for his symptoms. Pt denies fevers/chills, ha, loc, focal neuro deficits, cp/sob/palp, cough, abd pain/n/v/d, urinary symptoms, recent travel and sick contacts. No further complaints at this time.  CODE SEPSIS was initiated in the ED. 55 y/o M pt with significant PMHx of DM, HTN, and HLD presents to the ED BIBA c/o right foot gangrene that began months ago. Reports KRISTIAN foot pain. EMS reported that his boss went to his house and found him prompting the call EMS. His boss noted that he is has not seen the pt in months. Pt says that he last walked a month ago. He is noncompliant with his medications and is unaware of the last time he took his Insulin. Pt did not take any Tylenol for his symptoms. Pt denies fevers/chills, ha, loc, focal neuro deficits, cp/sob/palp, cough, abd pain/n/v/d, urinary symptoms, recent travel and sick contacts. No further complaints at this time.  CODE SEPSIS was initiated in the ED.

## 2019-07-31 NOTE — H&P ADULT - PROBLEM SELECTOR PLAN 10
pt high risk for vte given wound and immobility  however given pending surgery and anemia will hold off on heparin sq for now  please start if hemoglobin stable and patient not pending or s/p surgery  unable to do SCD's given leg wounds.

## 2019-07-31 NOTE — ED PROVIDER NOTE - CLINICAL SUMMARY MEDICAL DECISION MAKING FREE TEXT BOX
55 y/o M pt with significant PMHx of DM, HTN, and HLD presents to the ED c/o bilateral gangrene that began months ago. 55 y/o M pt with significant PMHx of DM, HTN, and HLD presents to the ED c/o bilateral foot gangrene that onset months ago. 55 y/o M pt with significant PMHx of DM, HTN, and HLD presents to the ED c/o right foot gangrene that onset months ago - pt with charcot like foot, seen by podiatry stating will need amputation higher up - vascular and ortho aware - pt here with sepsis - abx, IVF given - admit for further management

## 2019-07-31 NOTE — CONSULT NOTE ADULT - ATTENDING COMMENTS
- Orthopaedics does not typically perform amputations at [place], please consult vascular or ACS for further care.  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 Ortho Trauma Attending:  Agree with above PA note.  Note edited where necessary.    -Orthopaedic Surgery does not typically perform amputations at [facility]. Please consult vascular or ACS for further care.  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    Syed Marcano MD  Orthopaedic Trauma Surgery

## 2019-07-31 NOTE — ED PROVIDER NOTE - CHPI ED SYMPTOMS NEG
no vomiting/cough, diarrhea no fever/no chills/ha, loc, focal neuro deficits, cp/sob/palp, cough, abd pain/n/v/d, or urinary symptoms

## 2019-07-31 NOTE — ED PROVIDER NOTE - PSH
Amputation of great toe, right, traumatic  right great toe removed by surgery  Amputation of toe, left, traumatic  amputation of all the toes on the left foot  by surgery

## 2019-07-31 NOTE — H&P ADULT - NSICDXPASTMEDICALHX_GEN_ALL_CORE_FT
PAST MEDICAL HISTORY:  DM (diabetes mellitus)     ETOH abuse     HTN (hypertension)     Hyperlipidemia     Opioid abuse

## 2019-07-31 NOTE — ED PROVIDER NOTE - NS ED ROS FT
Pt denies fevers/chills, ha, loc, focal neuro deficits, cp/sob/palp, cough, abd pain/n/v/d, urinary symptoms, recent travel and sick contacts.

## 2019-07-31 NOTE — H&P ADULT - PROBLEM SELECTOR PLAN 8
will give patient 15 u lantus as will be npo for procedure, will monitor fs q6 while npo  check hb a1c given patient npo and glucose is normal will give 5 u lantus now and moderate ISS Q6 with finger sticks  once patient no longer NPO please titrate lantus and ISS accordingly  check hb a1c patient on basaglar 26 u qhs and metformin at home  given patient npo and glucose minimally elevated will give 8 units lantus now and monitor FS with ISS Q6  once patient no longer NPO please titrate lantus and ISS accordingly  check hb a1c

## 2019-07-31 NOTE — H&P ADULT - ASSESSMENT
54M hx HTN, HLD, DM2, ETOH abuse, IVDU (on suboxone), multiple toe/foot amputations presents with severe infection, found to have severe sepsis 2/2 foot wound, RBANT, hyponatremia and anemia. 54M hx HTN, HLD, DM2, ETOH abuse, IVDU (on suboxone), multiple toe/foot amputations presents with severe infection, found to have severe sepsis 2/2 foot gangrene, BRANT, hyponatremia and anemia.

## 2019-07-31 NOTE — ED PROVIDER NOTE - CARDIAC, MLM
Tachycardic, regular rhythm.  Heart sounds S1, S2. No heart murmur. Tachycardic, regular rhythm.  Heart sounds S1, S2.

## 2019-07-31 NOTE — H&P ADULT - PROBLEM SELECTOR PLAN 7
patient with TWI in v4-6 in setting of sinus tachycardia and sepsis, new from ekg in 2016  pt denies chest pain, pressure or prado. denies history of heart disease  initial troponin 0.01  will continue to trend troponins, repeat ekg, admit to telemetry  if trop elevated or dynamic ekg changes will call cardiology.

## 2019-07-31 NOTE — H&P ADULT - PROBLEM SELECTOR PLAN 2
patient with tachycardia, leukocytosis with BRANT meeting criteria for severe sepsis 2/2 foot gangrene  c/w vanc/zosyn as above, f/u blood cultures  will also check ua patient with tachycardia, leukocytosis with BRANT meeting criteria for severe sepsis 2/2 foot gangrene  c/w vanc/zosyn as above, f/u blood cultures  will also check ua  lactate 2.1, will repeat

## 2019-08-01 DIAGNOSIS — A41.9 SEPSIS, UNSPECIFIED ORGANISM: ICD-10-CM

## 2019-08-01 DIAGNOSIS — D64.9 ANEMIA, UNSPECIFIED: ICD-10-CM

## 2019-08-01 DIAGNOSIS — I96 GANGRENE, NOT ELSEWHERE CLASSIFIED: ICD-10-CM

## 2019-08-01 DIAGNOSIS — R94.31 ABNORMAL ELECTROCARDIOGRAM [ECG] [EKG]: ICD-10-CM

## 2019-08-01 DIAGNOSIS — F19.10 OTHER PSYCHOACTIVE SUBSTANCE ABUSE, UNCOMPLICATED: ICD-10-CM

## 2019-08-01 DIAGNOSIS — N17.9 ACUTE KIDNEY FAILURE, UNSPECIFIED: ICD-10-CM

## 2019-08-01 DIAGNOSIS — E87.1 HYPO-OSMOLALITY AND HYPONATREMIA: ICD-10-CM

## 2019-08-01 DIAGNOSIS — E11.9 TYPE 2 DIABETES MELLITUS WITHOUT COMPLICATIONS: ICD-10-CM

## 2019-08-01 DIAGNOSIS — E87.5 HYPERKALEMIA: ICD-10-CM

## 2019-08-01 DIAGNOSIS — Z29.9 ENCOUNTER FOR PROPHYLACTIC MEASURES, UNSPECIFIED: ICD-10-CM

## 2019-08-01 LAB
ABO RH CONFIRMATION: SIGNIFICANT CHANGE UP
ALBUMIN SERPL ELPH-MCNC: 1.3 G/DL — LOW (ref 3.3–5.2)
ALBUMIN SERPL ELPH-MCNC: 1.5 G/DL — LOW (ref 3.3–5.2)
ALP SERPL-CCNC: 250 U/L — HIGH (ref 40–120)
ALP SERPL-CCNC: 314 U/L — HIGH (ref 40–120)
ALT FLD-CCNC: 12 U/L — SIGNIFICANT CHANGE UP
ALT FLD-CCNC: 9 U/L — SIGNIFICANT CHANGE UP
AMPHET UR-MCNC: NEGATIVE — SIGNIFICANT CHANGE UP
ANION GAP SERPL CALC-SCNC: 13 MMOL/L — SIGNIFICANT CHANGE UP (ref 5–17)
ANION GAP SERPL CALC-SCNC: 14 MMOL/L — SIGNIFICANT CHANGE UP (ref 5–17)
ANION GAP SERPL CALC-SCNC: 9 MMOL/L — SIGNIFICANT CHANGE UP (ref 5–17)
ANISOCYTOSIS BLD QL: SLIGHT — SIGNIFICANT CHANGE UP
ANISOCYTOSIS BLD QL: SLIGHT — SIGNIFICANT CHANGE UP
APPEARANCE UR: CLEAR — SIGNIFICANT CHANGE UP
APTT BLD: 28.8 SEC — SIGNIFICANT CHANGE UP (ref 27.5–36.3)
AST SERPL-CCNC: 13 U/L — SIGNIFICANT CHANGE UP
AST SERPL-CCNC: 17 U/L — SIGNIFICANT CHANGE UP
BACTERIA # UR AUTO: ABNORMAL
BARBITURATES UR SCN-MCNC: NEGATIVE — SIGNIFICANT CHANGE UP
BASE EXCESS BLDV CALC-SCNC: -5.8 MMOL/L — LOW (ref -2–2)
BASOPHILS # BLD AUTO: 0 K/UL — SIGNIFICANT CHANGE UP (ref 0–0.2)
BASOPHILS # BLD AUTO: 0 K/UL — SIGNIFICANT CHANGE UP (ref 0–0.2)
BASOPHILS NFR BLD AUTO: 0 % — SIGNIFICANT CHANGE UP (ref 0–2)
BASOPHILS NFR BLD AUTO: 0 % — SIGNIFICANT CHANGE UP (ref 0–2)
BENZODIAZ UR-MCNC: NEGATIVE — SIGNIFICANT CHANGE UP
BILIRUB SERPL-MCNC: 0.5 MG/DL — SIGNIFICANT CHANGE UP (ref 0.4–2)
BILIRUB SERPL-MCNC: 0.8 MG/DL — SIGNIFICANT CHANGE UP (ref 0.4–2)
BILIRUB UR-MCNC: NEGATIVE — SIGNIFICANT CHANGE UP
BUN SERPL-MCNC: 102 MG/DL — HIGH (ref 8–20)
BUN SERPL-MCNC: 78 MG/DL — HIGH (ref 8–20)
BUN SERPL-MCNC: 92 MG/DL — HIGH (ref 8–20)
BURR CELLS BLD QL SMEAR: PRESENT — SIGNIFICANT CHANGE UP
CA-I SERPL-SCNC: 1.06 MMOL/L — LOW (ref 1.15–1.33)
CALCIUM SERPL-MCNC: 7.7 MG/DL — LOW (ref 8.6–10.2)
CALCIUM SERPL-MCNC: 7.7 MG/DL — LOW (ref 8.6–10.2)
CALCIUM SERPL-MCNC: 8.2 MG/DL — LOW (ref 8.6–10.2)
CHLORIDE BLDV-SCNC: 98 MMOL/L — SIGNIFICANT CHANGE UP (ref 98–107)
CHLORIDE SERPL-SCNC: 100 MMOL/L — SIGNIFICANT CHANGE UP (ref 98–107)
CHLORIDE SERPL-SCNC: 107 MMOL/L — SIGNIFICANT CHANGE UP (ref 98–107)
CHLORIDE SERPL-SCNC: 95 MMOL/L — LOW (ref 98–107)
CO2 SERPL-SCNC: 17 MMOL/L — LOW (ref 22–29)
CO2 SERPL-SCNC: 17 MMOL/L — LOW (ref 22–29)
CO2 SERPL-SCNC: 19 MMOL/L — LOW (ref 22–29)
COCAINE METAB.OTHER UR-MCNC: NEGATIVE — SIGNIFICANT CHANGE UP
COLOR SPEC: YELLOW — SIGNIFICANT CHANGE UP
COMMENT - URINE: SIGNIFICANT CHANGE UP
CREAT ?TM UR-MCNC: 136 MG/DL — SIGNIFICANT CHANGE UP
CREAT SERPL-MCNC: 1.15 MG/DL — SIGNIFICANT CHANGE UP (ref 0.5–1.3)
CREAT SERPL-MCNC: 1.52 MG/DL — HIGH (ref 0.5–1.3)
CREAT SERPL-MCNC: 2.13 MG/DL — HIGH (ref 0.5–1.3)
CRP SERPL-MCNC: 24.4 MG/DL — HIGH (ref 0–0.4)
DIFF PNL FLD: NEGATIVE — SIGNIFICANT CHANGE UP
EOSINOPHIL # BLD AUTO: 0 K/UL — SIGNIFICANT CHANGE UP (ref 0–0.5)
EOSINOPHIL # BLD AUTO: 0.14 K/UL — SIGNIFICANT CHANGE UP (ref 0–0.5)
EOSINOPHIL NFR BLD AUTO: 0 % — SIGNIFICANT CHANGE UP (ref 0–6)
EOSINOPHIL NFR BLD AUTO: 0.9 % — SIGNIFICANT CHANGE UP (ref 0–6)
EPI CELLS # UR: SIGNIFICANT CHANGE UP
ERYTHROCYTE [SEDIMENTATION RATE] IN BLOOD: 61 MM/HR — HIGH (ref 0–20)
ETHANOL SERPL-MCNC: <10 MG/DL — SIGNIFICANT CHANGE UP
GAS PNL BLDV: 127 MMOL/L — LOW (ref 135–145)
GAS PNL BLDV: SIGNIFICANT CHANGE UP
GAS PNL BLDV: SIGNIFICANT CHANGE UP
GIANT PLATELETS BLD QL SMEAR: PRESENT — SIGNIFICANT CHANGE UP
GLUCOSE BLDC GLUCOMTR-MCNC: 131 MG/DL — HIGH (ref 70–99)
GLUCOSE BLDC GLUCOMTR-MCNC: 135 MG/DL — HIGH (ref 70–99)
GLUCOSE BLDC GLUCOMTR-MCNC: 155 MG/DL — HIGH (ref 70–99)
GLUCOSE BLDC GLUCOMTR-MCNC: 156 MG/DL — HIGH (ref 70–99)
GLUCOSE BLDC GLUCOMTR-MCNC: 196 MG/DL — HIGH (ref 70–99)
GLUCOSE BLDV-MCNC: 210 MG/DL — HIGH (ref 70–99)
GLUCOSE SERPL-MCNC: 125 MG/DL — HIGH (ref 70–115)
GLUCOSE SERPL-MCNC: 131 MG/DL — HIGH (ref 70–115)
GLUCOSE SERPL-MCNC: 197 MG/DL — HIGH (ref 70–115)
GLUCOSE UR QL: NEGATIVE MG/DL — SIGNIFICANT CHANGE UP
GRAN CASTS # UR COMP ASSIST: ABNORMAL /LPF
HAV IGM SER-ACNC: SIGNIFICANT CHANGE UP
HBA1C BLD-MCNC: 7 % — HIGH (ref 4–5.6)
HBV CORE IGM SER-ACNC: SIGNIFICANT CHANGE UP
HBV SURFACE AG SER-ACNC: SIGNIFICANT CHANGE UP
HCO3 BLDV-SCNC: 20 MMOL/L — LOW (ref 21–29)
HCT VFR BLD CALC: 21.6 % — LOW (ref 39–50)
HCT VFR BLD CALC: 26.5 % — LOW (ref 39–50)
HCT VFR BLD CALC: 27.2 % — LOW (ref 39–50)
HCV AB S/CO SERPL IA: 0.08 S/CO — SIGNIFICANT CHANGE UP (ref 0–0.99)
HCV AB SERPL-IMP: SIGNIFICANT CHANGE UP
HGB BLD-MCNC: 6.4 G/DL — CRITICAL LOW (ref 13–17)
HGB BLD-MCNC: 8.2 G/DL — LOW (ref 13–17)
HGB BLD-MCNC: 8.3 G/DL — LOW (ref 13–17)
HIV 1 & 2 AB SERPL IA.RAPID: SIGNIFICANT CHANGE UP
HYPOCHROMIA BLD QL: SLIGHT — SIGNIFICANT CHANGE UP
INR BLD: 1.24 RATIO — HIGH (ref 0.88–1.16)
KETONES UR-MCNC: ABNORMAL
LACTATE BLDV-MCNC: 0.8 MMOL/L — SIGNIFICANT CHANGE UP (ref 0.5–2)
LEUKOCYTE ESTERASE UR-ACNC: NEGATIVE — SIGNIFICANT CHANGE UP
LIDOCAIN IGE QN: 11 U/L — LOW (ref 22–51)
LYMPHOCYTES # BLD AUTO: 1.41 K/UL — SIGNIFICANT CHANGE UP (ref 1–3.3)
LYMPHOCYTES # BLD AUTO: 1.67 K/UL — SIGNIFICANT CHANGE UP (ref 1–3.3)
LYMPHOCYTES # BLD AUTO: 8.8 % — LOW (ref 13–44)
LYMPHOCYTES # BLD AUTO: 8.9 % — LOW (ref 13–44)
MAGNESIUM SERPL-MCNC: 1.6 MG/DL — SIGNIFICANT CHANGE UP (ref 1.6–2.6)
MANUAL SMEAR VERIFICATION: SIGNIFICANT CHANGE UP
MANUAL SMEAR VERIFICATION: SIGNIFICANT CHANGE UP
MCHC RBC-ENTMCNC: 24.7 PG — LOW (ref 27–34)
MCHC RBC-ENTMCNC: 25.7 PG — LOW (ref 27–34)
MCHC RBC-ENTMCNC: 25.9 PG — LOW (ref 27–34)
MCHC RBC-ENTMCNC: 29.6 GM/DL — LOW (ref 32–36)
MCHC RBC-ENTMCNC: 30.1 GM/DL — LOW (ref 32–36)
MCHC RBC-ENTMCNC: 31.3 GM/DL — LOW (ref 32–36)
MCV RBC AUTO: 82.6 FL — SIGNIFICANT CHANGE UP (ref 80–100)
MCV RBC AUTO: 83.4 FL — SIGNIFICANT CHANGE UP (ref 80–100)
MCV RBC AUTO: 85.3 FL — SIGNIFICANT CHANGE UP (ref 80–100)
METHADONE UR-MCNC: NEGATIVE — SIGNIFICANT CHANGE UP
MONOCYTES # BLD AUTO: 0.83 K/UL — SIGNIFICANT CHANGE UP (ref 0–0.9)
MONOCYTES # BLD AUTO: 0.84 K/UL — SIGNIFICANT CHANGE UP (ref 0–0.9)
MONOCYTES NFR BLD AUTO: 4.4 % — SIGNIFICANT CHANGE UP (ref 2–14)
MONOCYTES NFR BLD AUTO: 5.3 % — SIGNIFICANT CHANGE UP (ref 2–14)
NEUTROPHILS # BLD AUTO: 13.3 K/UL — HIGH (ref 1.8–7.4)
NEUTROPHILS # BLD AUTO: 16.47 K/UL — HIGH (ref 1.8–7.4)
NEUTROPHILS NFR BLD AUTO: 80.5 % — HIGH (ref 43–77)
NEUTROPHILS NFR BLD AUTO: 86.8 % — HIGH (ref 43–77)
NEUTS BAND # BLD: 3.5 % — SIGNIFICANT CHANGE UP (ref 0–8)
NITRITE UR-MCNC: NEGATIVE — SIGNIFICANT CHANGE UP
OPIATES UR-MCNC: NEGATIVE — SIGNIFICANT CHANGE UP
OSMOLALITY UR: 340 MOSM/KG — SIGNIFICANT CHANGE UP (ref 300–1000)
PCO2 BLDV: 33 MMHG — LOW (ref 35–50)
PCP SPEC-MCNC: SIGNIFICANT CHANGE UP
PCP UR-MCNC: NEGATIVE — SIGNIFICANT CHANGE UP
PH BLDV: 7.37 — SIGNIFICANT CHANGE UP (ref 7.32–7.43)
PH UR: 5 — SIGNIFICANT CHANGE UP (ref 5–8)
PHOSPHATE SERPL-MCNC: 4.1 MG/DL — SIGNIFICANT CHANGE UP (ref 2.4–4.7)
PHOSPHATE SERPL-MCNC: 5 MG/DL — HIGH (ref 2.4–4.7)
PLAT MORPH BLD: NORMAL — SIGNIFICANT CHANGE UP
PLAT MORPH BLD: NORMAL — SIGNIFICANT CHANGE UP
PLATELET # BLD AUTO: 247 K/UL — SIGNIFICANT CHANGE UP (ref 150–400)
PLATELET # BLD AUTO: 260 K/UL — SIGNIFICANT CHANGE UP (ref 150–400)
PLATELET # BLD AUTO: 260 K/UL — SIGNIFICANT CHANGE UP (ref 150–400)
PO2 BLDV: 221 MMHG — HIGH (ref 25–45)
POIKILOCYTOSIS BLD QL AUTO: SLIGHT — SIGNIFICANT CHANGE UP
POLYCHROMASIA BLD QL SMEAR: SIGNIFICANT CHANGE UP
POLYCHROMASIA BLD QL SMEAR: SLIGHT — SIGNIFICANT CHANGE UP
POTASSIUM BLDV-SCNC: 4.7 MMOL/L — HIGH (ref 3.4–4.5)
POTASSIUM SERPL-MCNC: 4.5 MMOL/L — SIGNIFICANT CHANGE UP (ref 3.5–5.3)
POTASSIUM SERPL-MCNC: 4.8 MMOL/L — SIGNIFICANT CHANGE UP (ref 3.5–5.3)
POTASSIUM SERPL-MCNC: 4.9 MMOL/L — SIGNIFICANT CHANGE UP (ref 3.5–5.3)
POTASSIUM SERPL-SCNC: 4.5 MMOL/L — SIGNIFICANT CHANGE UP (ref 3.5–5.3)
POTASSIUM SERPL-SCNC: 4.8 MMOL/L — SIGNIFICANT CHANGE UP (ref 3.5–5.3)
POTASSIUM SERPL-SCNC: 4.9 MMOL/L — SIGNIFICANT CHANGE UP (ref 3.5–5.3)
PROT SERPL-MCNC: 5.9 G/DL — LOW (ref 6.6–8.7)
PROT SERPL-MCNC: 6.2 G/DL — LOW (ref 6.6–8.7)
PROT UR-MCNC: 15 MG/DL
PROTHROM AB SERPL-ACNC: 14.3 SEC — HIGH (ref 10–12.9)
RBC # BLD: 2.56 M/UL — LOW (ref 4.2–5.8)
RBC # BLD: 2.59 M/UL — LOW (ref 4.2–5.8)
RBC # BLD: 3.19 M/UL — LOW (ref 4.2–5.8)
RBC # BLD: 3.21 M/UL — LOW (ref 4.2–5.8)
RBC # FLD: 16.2 % — HIGH (ref 10.3–14.5)
RBC # FLD: 16.4 % — HIGH (ref 10.3–14.5)
RBC # FLD: 16.9 % — HIGH (ref 10.3–14.5)
RBC BLD AUTO: ABNORMAL
RBC BLD AUTO: ABNORMAL
RBC CASTS # UR COMP ASSIST: SIGNIFICANT CHANGE UP /HPF (ref 0–4)
RETICS #: 38.9 K/UL — SIGNIFICANT CHANGE UP (ref 25–125)
RETICS/RBC NFR: 1.5 % — SIGNIFICANT CHANGE UP (ref 0.5–2.5)
SAO2 % BLDV: 100 % — SIGNIFICANT CHANGE UP
SCHISTOCYTES BLD QL AUTO: SLIGHT — SIGNIFICANT CHANGE UP
SODIUM SERPL-SCNC: 126 MMOL/L — LOW (ref 135–145)
SODIUM SERPL-SCNC: 132 MMOL/L — LOW (ref 135–145)
SODIUM SERPL-SCNC: 133 MMOL/L — LOW (ref 135–145)
SODIUM UR-SCNC: <30 MMOL/L — SIGNIFICANT CHANGE UP
SP GR SPEC: 1.01 — SIGNIFICANT CHANGE UP (ref 1.01–1.02)
T4 AB SER-ACNC: 5 UG/DL — SIGNIFICANT CHANGE UP (ref 4.5–12)
THC UR QL: NEGATIVE — SIGNIFICANT CHANGE UP
TROPONIN T SERPL-MCNC: <0.01 NG/ML — SIGNIFICANT CHANGE UP (ref 0–0.06)
TSH SERPL-MCNC: 1.78 UIU/ML — SIGNIFICANT CHANGE UP (ref 0.27–4.2)
UROBILINOGEN FLD QL: 1 MG/DL
UUN UR-MCNC: 566 MG/DL — SIGNIFICANT CHANGE UP
VARIANT LYMPHS # BLD: 0.9 % — SIGNIFICANT CHANGE UP (ref 0–6)
VIT B12 SERPL-MCNC: >2000 PG/ML — HIGH (ref 232–1245)
WBC # BLD: 15.83 K/UL — HIGH (ref 3.8–10.5)
WBC # BLD: 16.25 K/UL — HIGH (ref 3.8–10.5)
WBC # BLD: 18.97 K/UL — HIGH (ref 3.8–10.5)
WBC # FLD AUTO: 15.83 K/UL — HIGH (ref 3.8–10.5)
WBC # FLD AUTO: 16.25 K/UL — HIGH (ref 3.8–10.5)
WBC # FLD AUTO: 18.97 K/UL — HIGH (ref 3.8–10.5)
WBC UR QL: SIGNIFICANT CHANGE UP

## 2019-08-01 PROCEDURE — 27882 AMPUTATION OF LOWER LEG: CPT | Mod: RT

## 2019-08-01 PROCEDURE — 99497 ADVNCD CARE PLAN 30 MIN: CPT | Mod: GC

## 2019-08-01 PROCEDURE — 99223 1ST HOSP IP/OBS HIGH 75: CPT

## 2019-08-01 PROCEDURE — 99233 SBSQ HOSP IP/OBS HIGH 50: CPT | Mod: GC

## 2019-08-01 PROCEDURE — 99221 1ST HOSP IP/OBS SF/LOW 40: CPT | Mod: 57

## 2019-08-01 RX ORDER — GLUCAGON INJECTION, SOLUTION 0.5 MG/.1ML
1 INJECTION, SOLUTION SUBCUTANEOUS ONCE
Refills: 0 | Status: DISCONTINUED | OUTPATIENT
Start: 2019-08-01 | End: 2019-08-01

## 2019-08-01 RX ORDER — DEXTROSE 50 % IN WATER 50 %
12.5 SYRINGE (ML) INTRAVENOUS ONCE
Refills: 0 | Status: DISCONTINUED | OUTPATIENT
Start: 2019-08-01 | End: 2019-08-01

## 2019-08-01 RX ORDER — SODIUM CHLORIDE 9 MG/ML
1000 INJECTION, SOLUTION INTRAVENOUS
Refills: 0 | Status: DISCONTINUED | OUTPATIENT
Start: 2019-08-01 | End: 2019-08-07

## 2019-08-01 RX ORDER — CEFAZOLIN SODIUM 1 G
2000 VIAL (EA) INJECTION ONCE
Refills: 0 | Status: DISCONTINUED | OUTPATIENT
Start: 2019-08-01 | End: 2019-08-01

## 2019-08-01 RX ORDER — ACETAMINOPHEN 500 MG
975 TABLET ORAL EVERY 8 HOURS
Refills: 0 | Status: COMPLETED | OUTPATIENT
Start: 2019-08-01 | End: 2019-08-03

## 2019-08-01 RX ORDER — ETHYL CHLORIDE
1 AEROSOL, SPRAY (ML) TOPICAL ONCE
Refills: 0 | Status: COMPLETED | OUTPATIENT
Start: 2019-08-01 | End: 2019-08-01

## 2019-08-01 RX ORDER — INSULIN GLARGINE 100 [IU]/ML
8 INJECTION, SOLUTION SUBCUTANEOUS ONCE
Refills: 0 | Status: COMPLETED | OUTPATIENT
Start: 2019-08-01 | End: 2019-08-01

## 2019-08-01 RX ORDER — THIAMINE MONONITRATE (VIT B1) 100 MG
100 TABLET ORAL DAILY
Refills: 0 | Status: DISCONTINUED | OUTPATIENT
Start: 2019-08-01 | End: 2019-08-07

## 2019-08-01 RX ORDER — SODIUM HYPOCHLORITE 0.125 %
1 SOLUTION, NON-ORAL MISCELLANEOUS ONCE
Refills: 0 | Status: COMPLETED | OUTPATIENT
Start: 2019-08-01 | End: 2019-08-01

## 2019-08-01 RX ORDER — PIPERACILLIN AND TAZOBACTAM 4; .5 G/20ML; G/20ML
3.38 INJECTION, POWDER, LYOPHILIZED, FOR SOLUTION INTRAVENOUS EVERY 8 HOURS
Refills: 0 | Status: DISCONTINUED | OUTPATIENT
Start: 2019-08-01 | End: 2019-08-07

## 2019-08-01 RX ORDER — SODIUM CHLORIDE 9 MG/ML
1000 INJECTION, SOLUTION INTRAVENOUS
Refills: 0 | Status: DISCONTINUED | OUTPATIENT
Start: 2019-08-01 | End: 2019-08-02

## 2019-08-01 RX ORDER — FENTANYL CITRATE 50 UG/ML
50 INJECTION INTRAVENOUS
Refills: 0 | Status: DISCONTINUED | OUTPATIENT
Start: 2019-08-01 | End: 2019-08-01

## 2019-08-01 RX ORDER — KETOROLAC TROMETHAMINE 30 MG/ML
15 SYRINGE (ML) INJECTION EVERY 8 HOURS
Refills: 0 | Status: DISCONTINUED | OUTPATIENT
Start: 2019-08-01 | End: 2019-08-06

## 2019-08-01 RX ORDER — HYDROMORPHONE HYDROCHLORIDE 2 MG/ML
1 INJECTION INTRAMUSCULAR; INTRAVENOUS; SUBCUTANEOUS
Refills: 0 | Status: DISCONTINUED | OUTPATIENT
Start: 2019-08-01 | End: 2019-08-02

## 2019-08-01 RX ORDER — DEXTROSE 50 % IN WATER 50 %
15 SYRINGE (ML) INTRAVENOUS ONCE
Refills: 0 | Status: DISCONTINUED | OUTPATIENT
Start: 2019-08-01 | End: 2019-08-01

## 2019-08-01 RX ORDER — HYDROMORPHONE HYDROCHLORIDE 2 MG/ML
1 INJECTION INTRAMUSCULAR; INTRAVENOUS; SUBCUTANEOUS ONCE
Refills: 0 | Status: DISCONTINUED | OUTPATIENT
Start: 2019-08-01 | End: 2019-08-01

## 2019-08-01 RX ORDER — DEXTROSE 50 % IN WATER 50 %
15 SYRINGE (ML) INTRAVENOUS ONCE
Refills: 0 | Status: DISCONTINUED | OUTPATIENT
Start: 2019-08-01 | End: 2019-08-07

## 2019-08-01 RX ORDER — HYDROMORPHONE HYDROCHLORIDE 2 MG/ML
0.5 INJECTION INTRAMUSCULAR; INTRAVENOUS; SUBCUTANEOUS
Refills: 0 | Status: DISCONTINUED | OUTPATIENT
Start: 2019-08-01 | End: 2019-08-02

## 2019-08-01 RX ORDER — MORPHINE SULFATE 50 MG/1
2 CAPSULE, EXTENDED RELEASE ORAL EVERY 6 HOURS
Refills: 0 | Status: DISCONTINUED | OUTPATIENT
Start: 2019-08-01 | End: 2019-08-01

## 2019-08-01 RX ORDER — SODIUM CHLORIDE 9 MG/ML
1000 INJECTION INTRAMUSCULAR; INTRAVENOUS; SUBCUTANEOUS
Refills: 0 | Status: DISCONTINUED | OUTPATIENT
Start: 2019-08-01 | End: 2019-08-01

## 2019-08-01 RX ORDER — SODIUM CHLORIDE 9 MG/ML
1000 INJECTION, SOLUTION INTRAVENOUS
Refills: 0 | Status: DISCONTINUED | OUTPATIENT
Start: 2019-08-01 | End: 2019-08-01

## 2019-08-01 RX ORDER — INSULIN LISPRO 100/ML
VIAL (ML) SUBCUTANEOUS
Refills: 0 | Status: DISCONTINUED | OUTPATIENT
Start: 2019-08-01 | End: 2019-08-07

## 2019-08-01 RX ORDER — DEXTROSE 50 % IN WATER 50 %
25 SYRINGE (ML) INTRAVENOUS ONCE
Refills: 0 | Status: DISCONTINUED | OUTPATIENT
Start: 2019-08-01 | End: 2019-08-01

## 2019-08-01 RX ORDER — SACCHAROMYCES BOULARDII 250 MG
250 POWDER IN PACKET (EA) ORAL
Refills: 0 | Status: DISCONTINUED | OUTPATIENT
Start: 2019-08-01 | End: 2019-08-07

## 2019-08-01 RX ORDER — GLUCAGON INJECTION, SOLUTION 0.5 MG/.1ML
1 INJECTION, SOLUTION SUBCUTANEOUS ONCE
Refills: 0 | Status: DISCONTINUED | OUTPATIENT
Start: 2019-08-01 | End: 2019-08-07

## 2019-08-01 RX ORDER — ONDANSETRON 8 MG/1
4 TABLET, FILM COATED ORAL ONCE
Refills: 0 | Status: DISCONTINUED | OUTPATIENT
Start: 2019-08-01 | End: 2019-08-01

## 2019-08-01 RX ORDER — FOLIC ACID 0.8 MG
1 TABLET ORAL DAILY
Refills: 0 | Status: DISCONTINUED | OUTPATIENT
Start: 2019-08-01 | End: 2019-08-07

## 2019-08-01 RX ORDER — INSULIN LISPRO 100/ML
VIAL (ML) SUBCUTANEOUS EVERY 6 HOURS
Refills: 0 | Status: DISCONTINUED | OUTPATIENT
Start: 2019-08-01 | End: 2019-08-01

## 2019-08-01 RX ORDER — MORPHINE SULFATE 50 MG/1
4 CAPSULE, EXTENDED RELEASE ORAL EVERY 6 HOURS
Refills: 0 | Status: DISCONTINUED | OUTPATIENT
Start: 2019-08-01 | End: 2019-08-01

## 2019-08-01 RX ORDER — CYCLOBENZAPRINE HYDROCHLORIDE 10 MG/1
5 TABLET, FILM COATED ORAL THREE TIMES A DAY
Refills: 0 | Status: DISCONTINUED | OUTPATIENT
Start: 2019-08-01 | End: 2019-08-02

## 2019-08-01 RX ORDER — VANCOMYCIN HCL 1 G
1000 VIAL (EA) INTRAVENOUS EVERY 12 HOURS
Refills: 0 | Status: DISCONTINUED | OUTPATIENT
Start: 2019-08-01 | End: 2019-08-03

## 2019-08-01 RX ORDER — DEXTROSE 50 % IN WATER 50 %
25 SYRINGE (ML) INTRAVENOUS ONCE
Refills: 0 | Status: DISCONTINUED | OUTPATIENT
Start: 2019-08-01 | End: 2019-08-07

## 2019-08-01 RX ORDER — DEXTROSE 50 % IN WATER 50 %
12.5 SYRINGE (ML) INTRAVENOUS ONCE
Refills: 0 | Status: DISCONTINUED | OUTPATIENT
Start: 2019-08-01 | End: 2019-08-07

## 2019-08-01 RX ADMIN — HYDROMORPHONE HYDROCHLORIDE 0.5 MILLIGRAM(S): 2 INJECTION INTRAMUSCULAR; INTRAVENOUS; SUBCUTANEOUS at 16:50

## 2019-08-01 RX ADMIN — PIPERACILLIN AND TAZOBACTAM 25 GRAM(S): 4; .5 INJECTION, POWDER, LYOPHILIZED, FOR SOLUTION INTRAVENOUS at 11:27

## 2019-08-01 RX ADMIN — Medication 1: at 18:21

## 2019-08-01 RX ADMIN — HYDROMORPHONE HYDROCHLORIDE 1 MILLIGRAM(S): 2 INJECTION INTRAMUSCULAR; INTRAVENOUS; SUBCUTANEOUS at 18:38

## 2019-08-01 RX ADMIN — FENTANYL CITRATE 50 MICROGRAM(S): 50 INJECTION INTRAVENOUS at 16:00

## 2019-08-01 RX ADMIN — Medication 1 TABLET(S): at 11:27

## 2019-08-01 RX ADMIN — Medication 975 MILLIGRAM(S): at 19:06

## 2019-08-01 RX ADMIN — Medication 975 MILLIGRAM(S): at 18:06

## 2019-08-01 RX ADMIN — HYDROMORPHONE HYDROCHLORIDE 1 MILLIGRAM(S): 2 INJECTION INTRAMUSCULAR; INTRAVENOUS; SUBCUTANEOUS at 22:52

## 2019-08-01 RX ADMIN — PIPERACILLIN AND TAZOBACTAM 25 GRAM(S): 4; .5 INJECTION, POWDER, LYOPHILIZED, FOR SOLUTION INTRAVENOUS at 21:16

## 2019-08-01 RX ADMIN — Medication 1 APPLICATION(S): at 12:21

## 2019-08-01 RX ADMIN — FENTANYL CITRATE 50 MICROGRAM(S): 50 INJECTION INTRAVENOUS at 15:50

## 2019-08-01 RX ADMIN — SODIUM CHLORIDE 75 MILLILITER(S): 9 INJECTION, SOLUTION INTRAVENOUS at 19:12

## 2019-08-01 RX ADMIN — Medication 2: at 06:07

## 2019-08-01 RX ADMIN — HYDROMORPHONE HYDROCHLORIDE 0.5 MILLIGRAM(S): 2 INJECTION INTRAMUSCULAR; INTRAVENOUS; SUBCUTANEOUS at 22:56

## 2019-08-01 RX ADMIN — HYDROMORPHONE HYDROCHLORIDE 1 MILLIGRAM(S): 2 INJECTION INTRAMUSCULAR; INTRAVENOUS; SUBCUTANEOUS at 18:08

## 2019-08-01 RX ADMIN — Medication 100 MILLIGRAM(S): at 18:05

## 2019-08-01 RX ADMIN — FENTANYL CITRATE 25 MICROGRAM(S): 50 INJECTION INTRAVENOUS at 00:35

## 2019-08-01 RX ADMIN — HYDROMORPHONE HYDROCHLORIDE 0.5 MILLIGRAM(S): 2 INJECTION INTRAMUSCULAR; INTRAVENOUS; SUBCUTANEOUS at 16:20

## 2019-08-01 RX ADMIN — Medication 50 MILLILITER(S): at 00:08

## 2019-08-01 RX ADMIN — HYDROMORPHONE HYDROCHLORIDE 1 MILLIGRAM(S): 2 INJECTION INTRAMUSCULAR; INTRAVENOUS; SUBCUTANEOUS at 12:21

## 2019-08-01 RX ADMIN — Medication 250 MILLIGRAM(S): at 18:06

## 2019-08-01 RX ADMIN — Medication 1 MILLIGRAM(S): at 18:05

## 2019-08-01 RX ADMIN — Medication 250 MILLIGRAM(S): at 00:10

## 2019-08-01 RX ADMIN — PIPERACILLIN AND TAZOBACTAM 25 GRAM(S): 4; .5 INJECTION, POWDER, LYOPHILIZED, FOR SOLUTION INTRAVENOUS at 04:02

## 2019-08-01 RX ADMIN — MORPHINE SULFATE 4 MILLIGRAM(S): 50 CAPSULE, EXTENDED RELEASE ORAL at 02:23

## 2019-08-01 RX ADMIN — HYDROMORPHONE HYDROCHLORIDE 0.5 MILLIGRAM(S): 2 INJECTION INTRAMUSCULAR; INTRAVENOUS; SUBCUTANEOUS at 17:00

## 2019-08-01 RX ADMIN — INSULIN GLARGINE 8 UNIT(S): 100 INJECTION, SOLUTION SUBCUTANEOUS at 02:25

## 2019-08-01 RX ADMIN — Medication 1 TABLET(S): at 18:06

## 2019-08-01 RX ADMIN — INSULIN HUMAN 6 UNIT(S): 100 INJECTION, SOLUTION SUBCUTANEOUS at 00:07

## 2019-08-01 RX ADMIN — DULOXETINE HYDROCHLORIDE 20 MILLIGRAM(S): 30 CAPSULE, DELAYED RELEASE ORAL at 06:04

## 2019-08-01 RX ADMIN — ZOLPIDEM TARTRATE 5 MILLIGRAM(S): 10 TABLET ORAL at 02:24

## 2019-08-01 RX ADMIN — Medication 975 MILLIGRAM(S): at 00:56

## 2019-08-01 RX ADMIN — SODIUM CHLORIDE 1000 MILLILITER(S): 9 INJECTION INTRAMUSCULAR; INTRAVENOUS; SUBCUTANEOUS at 00:38

## 2019-08-01 RX ADMIN — MORPHINE SULFATE 4 MILLIGRAM(S): 50 CAPSULE, EXTENDED RELEASE ORAL at 09:41

## 2019-08-01 RX ADMIN — Medication 975 MILLIGRAM(S): at 00:26

## 2019-08-01 RX ADMIN — Medication 100 MILLIGRAM(S): at 11:28

## 2019-08-01 RX ADMIN — FENTANYL CITRATE 50 MICROGRAM(S): 50 INJECTION INTRAVENOUS at 16:20

## 2019-08-01 RX ADMIN — HYDROMORPHONE HYDROCHLORIDE 1 MILLIGRAM(S): 2 INJECTION INTRAMUSCULAR; INTRAVENOUS; SUBCUTANEOUS at 21:16

## 2019-08-01 RX ADMIN — FENTANYL CITRATE 25 MICROGRAM(S): 50 INJECTION INTRAVENOUS at 00:05

## 2019-08-01 NOTE — ED ADULT NURSE NOTE - OBJECTIVE STATEMENT
pt a&ox3 resting in stretcher. cardiac monitor in place. sinus tach on monitor. pt biba. pt employer called for welfare check. pt was found and brought to ed with maggots in wounds of right foot.

## 2019-08-01 NOTE — ED ADULT NURSE REASSESSMENT NOTE - NS ED NURSE REASSESS COMMENT FT1
pt c/o  back pain during first 15 mins prbc transfusion. temp=97.5 hz=925,bp= 107/55, md cuello aware. rn to continue transfusion.

## 2019-08-01 NOTE — CONSULT NOTE ADULT - ASSESSMENT
54M hx HTN, HLD, DM2, ETOH abuse, IVDU (on suboxone), multiple toe/foot amputations presents with RLE foot ulcer and pain x 5 months.  In ED temp of 99.7, heart rate 150 (improved to 126 s/p ivf bolus), bp 86/54 (improved to 97/54), RR- 22 saturating 95% on room air.   Labs WBC 20, Hb 8, Na 123, K 7, Cr 2    Overall, severe sepsis secondary to wet gangrene, maggot infestation of wound, BRANT, leukocytosis, anemia, uncontrolled diabetes    - Agree with plan for urgent amputation for source control  - Blood cultures   - c/w Zosyn 3.375 g IV q8h and vancomycin by level   - Further vascular workup/imaging per surgery  - Trend Fever  - Trend Leukocytosis  - Trend H/H  - Contact isolation    D/w Vascular surgery, infection control and Dr Delarosa  Will Follow 54M hx HTN, HLD, DM2, ETOH abuse, IVDU (on suboxone), multiple toe/foot amputations presents with RLE foot ulcer and pain x 5 months.  In ED temp of 99.7, heart rate 150 (improved to 126 s/p ivf bolus), bp 86/54 (improved to 97/54), RR- 22 saturating 95% on room air.   Labs WBC 20, Hb 8, Na 123, K 7, Cr 2    Overall, severe sepsis secondary to wet gangrene, maggot infestation of wound, BRANT, leukocytosis, anemia, uncontrolled diabetes    - Agree with plan for urgent amputation for source control  - Blood cultures to check for bacteremia  - c/w Zosyn 3.375 g IV q8h and vancomycin by level   - Further vascular workup/imaging per surgery  - Trend Fever  - Trend Leukocytosis  - Trend H/H  - Contact isolation    D/w Vascular surgery, infection control and Dr Delarosa  Will Follow

## 2019-08-01 NOTE — BRIEF OPERATIVE NOTE - NSEVIDNCEINFORABSCESSFT_GEN_ALL_CORE
Right foot and ankle completely gangrenous - profound infestation of foot and ankle superficial & deep tissues with copious live maggots tunneling throughout; exposed mummified bone stripped of tissue by maggots; Right nicko escalera

## 2019-08-01 NOTE — CONSULT NOTE ADULT - SUBJECTIVE AND OBJECTIVE BOX
[facility]  INFECTIOUS DISEASES AND INTERNAL MEDICINE at [place]  =======================================================  Rupesh Santa MD  Diplomates American Board of Internal Medicine and Infectious Diseases  Tel: [phone]      Fax: [phone]  =======================================================      [id number]  BREANNA MENDOZA is a 54y  Male     CC: Patient is a 54y old  Male who presents with a chief complaint of sepsis, foot wound (01 Aug 2019 12:46)    HPI:  54M hx HTN, HLD, DM2, ETOH abuse, IVDU (on suboxone), multiple toe/foot amputations presents with severe foot infection. Patient states that lives on own and hasn't been able to leave house for the last month. He states he's friend with delivery people that bring him his food and meds. He's had multiple foot/toe amputations due to diabetic ulcers. The last one was about two years ago. He's noticed this foot wound brewing for a while, but has not sought medical attention because was concerned that would need amputation and thought could take care of it himself. He states for the last month has not mila able to walk on foot. He states for the past few days he's been having worsening bilateral leg and foot pain and has felt very fatigued. Today his brother came to check on him and upon seeing the foot wound made him call EMS and come to ED.     Pt has history of etoh abuse, but states cut back a lot. He states drinks bottle of champagne a couple times a month and last time being two weeks ago. He also initially denied history of IVDU, however when asked about suboxone subscription he states that he's been on suboxone for last 2-3 years.     Upon arriving to ED, patient was had temp of 99.7, heart rate 150 (improved to 126 s/p ivf bolus), bp 86/54 (improved to 97/54), RR- 22 saturating 95% on room air. Patient was found to have diffuse ulcers of foot and ankle with exposed bones/tendons and infested with maggots. Patient given 2.7 bolus, vanc and zosyn. Podiatry washed out wound. (2019 23:37)      PAST MEDICAL & SURGICAL HISTORY:  Opioid abuse  ETOH abuse  Hyperlipidemia  HTN (hypertension)  DM (diabetes mellitus)  Amputation of toe, left, traumatic: amputation of all the toes on the left foot  by surgery  Amputation of great toe, right, traumatic: right great toe removed by surgery      Social Hx: + EtOH    FAMILY HISTORY:  No pertinent family history in first degree relatives      Allergies    Allergy Status Unknown    Intolerances        MEDICATIONS  (STANDING):  atorvastatin 40 milliGRAM(s) Oral at bedtime  ceFAZolin   IVPB 2000 milliGRAM(s) IV Intermittent once  dextrose 5%. 1000 milliLiter(s) (50 mL/Hr) IV Continuous <Continuous>  dextrose 50% Injectable 12.5 Gram(s) IV Push once  dextrose 50% Injectable 25 Gram(s) IV Push once  dextrose 50% Injectable 25 Gram(s) IV Push once  DULoxetine 20 milliGRAM(s) Oral two times a day  folic acid 1 milliGRAM(s) Oral daily  multivitamin 1 Tablet(s) Oral daily  piperacillin/tazobactam IVPB.. 3.375 Gram(s) IV Intermittent every 8 hours  sodium chloride 0.9%. 1000 milliLiter(s) (85 mL/Hr) IV Continuous <Continuous>  thiamine 100 milliGRAM(s) Oral daily  vancomycin  IVPB 1000 milliGRAM(s) IV Intermittent every 24 hours    MEDICATIONS  (PRN):  dextrose 40% Gel 15 Gram(s) Oral once PRN Blood Glucose LESS THAN 70 milliGRAM(s)/deciliter  glucagon  Injectable 1 milliGRAM(s) IntraMuscular once PRN Glucose LESS THAN 70 milligrams/deciliter  morphine  - Injectable 4 milliGRAM(s) IV Push every 6 hours PRN Severe Pain (7 - 10)  morphine  - Injectable 2 milliGRAM(s) IV Push every 6 hours PRN Moderate Pain (4 - 6)  zolpidem 5 milliGRAM(s) Oral at bedtime PRN Insomnia  zolpidem 5 milliGRAM(s) Oral at bedtime PRN Insomnia      ANTIMICROBIALS:  ceFAZolin   IVPB 2000 once  piperacillin/tazobactam IVPB.. 3.375 every 8 hours  vancomycin  IVPB 1000 every 24 hours      OTHER MEDS: MEDICATIONS  (STANDING):  atorvastatin 40 at bedtime  dextrose 40% Gel 15 once PRN  dextrose 50% Injectable 12.5 once  dextrose 50% Injectable 25 once  dextrose 50% Injectable 25 once  DULoxetine 20 two times a day  glucagon  Injectable 1 once PRN  morphine  - Injectable 4 every 6 hours PRN  morphine  - Injectable 2 every 6 hours PRN  zolpidem 5 at bedtime PRN  zolpidem 5 at bedtime PRN             REVIEW OF SYSTEMS:  CONSTITUTIONAL:  No Fever or chills  HEENT:  No diplopia or blurred vision.  No earache, sore throat or runny nose.  CARDIOVASCULAR:  No pressure, squeezing, strangling, tightness, heaviness or aching about the chest, neck, axilla or epigastrium.  RESPIRATORY:  No cough, shortness of breath  GASTROINTESTINAL:  No nausea, vomiting or diarrhea.  GENITOURINARY:  No dysuria, frequency or urgency. No Blood in urine  MUSCULOSKELETAL:  no joint aches, no muscle pain  SKIN:  No change in skin, hair or nails.  NEUROLOGIC:  No Headaches, seizures or weakness.  PSYCHIATRIC:  No disorder of thought or mood.  ENDOCRINE:  No heat or cold intolerance  HEMATOLOGICAL:  No easy bruising or bleeding.           I&O's Detail        Physical Exam:  Vital Signs Last 24 Hrs  T(C): 36.4 (01 Aug 2019 12:05), Max: 37.6 (2019 18:47)  T(F): 97.6 (01 Aug 2019 12:05), Max: 99.7 (2019 18:47)  HR: 102 (01 Aug 2019 12:05) (99 - 150)  BP: 125/68 (01 Aug 2019 12:05) (86/53 - 125/68)  BP(mean): --  RR: 19 (01 Aug 2019 12:05) (18 - 22)  SpO2: 100% (01 Aug 2019 12:05) (95% - 100%)  Height (cm): 180.34 ( @ 18:47)  Weight (kg): 86.2 ( @ 18:47)  BMI (kg/m2): 26.5 ( @ 18:47)  BSA (m2): 2.06 ( @ 18:47)  GEN: NAD, obese, drowsy  HEENT: normocephalic and atraumatic. EOMI. PERRL.  Anicteric  NECK: Supple.   LUNGS: Clear to auscultation.  HEART: Regular rate and rhythm without murmur.  ABDOMEN: Soft, nontender, and nondistended.  Positive bowel sounds.    : No CVA tenderness  EXTREMITIES: Without any edema.  MSK: No joint swelling  NEUROLOGIC: Cranial nerves II through XII are grossly intact. No Focal Deficits  PSYCHIATRIC: Appropriate affect .  SKIN: left foot TMA stump, b//l LE swelling and venous stasis, right foot wet gangrene foul smelling necrotic wounds with extensive soft tissue defect and maggot infestation + TTP        Labs:      126<L>  |  95<L>  |  102.0<H>  ----------------------------<  197<H>  4.9   |  17.0<L>  |  2.13<H>    Ca    7.7<L>      01 Aug 2019 01:54  Phos  5.0         TPro  5.9<L>  /  Alb  1.3<L>  /  TBili  0.5  /  DBili  x   /  AST  13  /  ALT  9   /  AlkPhos  250<H>                            6.4    18.97 )-----------( 260      ( 01 Aug 2019 01:54 )             21.6       PT/INR - ( 2019 19:33 )   PT: 14.0 sec;   INR: 1.21 ratio         PTT - ( 2019 19:33 )  PTT:31.6 sec  Urinalysis Basic - ( 01 Aug 2019 05:22 )    Color: Yellow / Appearance: Clear / S.015 / pH: x  Gluc: x / Ketone: Trace  / Bili: Negative / Urobili: 1 mg/dL   Blood: x / Protein: 15 mg/dL / Nitrite: Negative   Leuk Esterase: Negative / RBC: 0-2 /HPF / WBC 3-5   Sq Epi: x / Non Sq Epi: Few / Bacteria: Moderate      LIVER FUNCTIONS - ( 01 Aug 2019 01:54 )  Alb: 1.3 g/dL / Pro: 5.9 g/dL / ALK PHOS: 250 U/L / ALT: 9 U/L / AST: 13 U/L / GGT: x           CARDIAC MARKERS ( 01 Aug 2019 01:54 )  x     / <0.01 ng/mL / x     / x     / x      CARDIAC MARKERS ( 2019 21:07 )  x     / 0.01 ng/mL / 21 U/L / x     / x          CAPILLARY BLOOD GLUCOSE      POCT Blood Glucose.: 156 mg/dL (01 Aug 2019 06:02)  POCT Blood Glucose.: 196 mg/dL (01 Aug 2019 02:29)  POCT Blood Glucose.: 135 mg/dL (01 Aug 2019 00:00)  POCT Blood Glucose.: 133 mg/dL (2019 18:59)        RECENT CULTURES:        Radiology:  < from: Xray Ankle Complete 3 Views, Right (19 @ 22:25) >  FINDINGS:   No prior examinations are available for review.    Diffuse soft tissue swellingnoted with multiple soft tissue ulcerations.   There is rocker-bottom deformity with a destructive bone lesions of the   lateral medial malleolus, but tarsal bones and metatarsal bones with   pathologic fracture of the first metatarsal bone. Findingsconsistent   with a osteomyelitis. Distal phalanx of third digit absent.   IMPRESSION:   Soft tissue swelling, ulcerations consistent with diffuse   osteomyelitis involving the phalanges and metatarsal bones tarsal bones   possibly ankle joint/show bilateral medial malleolus.    < end of copied text >

## 2019-08-01 NOTE — ED ADULT NURSE REASSESSMENT NOTE - NS ED NURSE REASSESS COMMENT FT1
Assumed pt care at 0730.  Pt a&ox2, blood transfusion completed, no acute s/s of respiratory distress noted or reported at this time, pt remains on isolation precautions, will continue to monitor

## 2019-08-01 NOTE — CHART NOTE - NSCHARTNOTEFT_GEN_A_CORE
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 He reports pain is poorly controlled and minor headache. Denies any chest pain, shortness of breath, nausea, vomiting, or any other new or concernign symtpoms.     Vitals    T(C): 36.7 (08-02-19 @ 00:50), Max: 36.7 (08-01-19 @ 04:12)  HR: 117 (08-02-19 @ 00:50) (99 - 117)  BP: 118/68 (08-02-19 @ 00:50) (86/53 - 125/68)  RR: 17 (08-02-19 @ 00:50) (14 - 20)  SpO2: 92% (08-02-19 @ 00:50) (92% - 100%)  Wt(kg): --      08-01 @ 07:01  -  08-02 @ 02:26  --------------------------------------------------------  IN:    lactated ringers.: 125 mL    multiple electrolytes Injection Type 1: 450 mL    Oral Fluid: 490 mL    Solution: 100 mL  Total IN: 1165 mL    OUT:    Indwelling Catheter - Urethral: 700 mL  Total OUT: 700 mL    Total NET: 465 mL          Labs                        8.3    16.25 )-----------( 247      ( 01 Aug 2019 16:47 )             26.5       CBC Full  -  ( 01 Aug 2019 16:47 )  WBC Count : 16.25 K/uL  Hemoglobin : 8.3 g/dL  Hematocrit : 26.5 %  Platelet Count - Automated : 247 K/uL  Mean Cell Volume : 82.6 fl  Mean Cell Hemoglobin : 25.9 pg  Mean Cell Hemoglobin Concentration : 31.3 gm/dL  Auto Neutrophil # : x  Auto Lymphocyte # : x  Auto Monocyte # : x  Auto Eosinophil # : x  Auto Basophil # : x  Auto Neutrophil % : x  Auto Lymphocyte % : x  Auto Monocyte % : x  Auto Eosinophil % : x  Auto Basophil % : x      Physical Exam  General: NAD AAOx3   Cards: RRR S1S2  Resp: CTAB  Abdomen:  :  Ext: NTBL    Patient is a 54y old Male s/p Post Operative Check    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 He reports pain is poorly controlled and minor headache. Denies any chest pain, shortness of breath, nausea, vomiting, or any other new or concerning. Tran in place. symptoms.     Vitals    T(C): 36.7 (08-02-19 @ 00:50), Max: 36.7 (08-01-19 @ 04:12)  HR: 117 (08-02-19 @ 00:50) (99 - 117)  BP: 118/68 (08-02-19 @ 00:50) (86/53 - 125/68)  RR: 17 (08-02-19 @ 00:50) (14 - 20)  SpO2: 92% (08-02-19 @ 00:50) (92% - 100%)  Wt(kg): --      08-01 @ 07:01  -  08-02 @ 02:26  --------------------------------------------------------  IN:    lactated ringers.: 125 mL    multiple electrolytes Injection Type 1: 450 mL    Oral Fluid: 490 mL    Solution: 100 mL  Total IN: 1165 mL    OUT:    Indwelling Catheter - Urethral: 700 mL  Total OUT: 700 mL    Total NET: 465 mL          Labs                        8.3    16.25 )-----------( 247      ( 01 Aug 2019 16:47 )             26.5       CBC Full  -  ( 01 Aug 2019 16:47 )  WBC Count : 16.25 K/uL  Hemoglobin : 8.3 g/dL  Hematocrit : 26.5 %  Platelet Count - Automated : 247 K/uL  Mean Cell Volume : 82.6 fl  Mean Cell Hemoglobin : 25.9 pg  Mean Cell Hemoglobin Concentration : 31.3 gm/dL  Auto Neutrophil # : x  Auto Lymphocyte # : x  Auto Monocyte # : x  Auto Eosinophil # : x  Auto Basophil # : x  Auto Neutrophil % : x  Auto Lymphocyte % : x  Auto Monocyte % : x  Auto Eosinophil % : x  Auto Basophil % : x      Physical Exam  General: NAD AAOx3   Cards: RRR S1S2  Resp: CTAB  Abdomen: Soft, nontender, nondistended.   Ext: Left BKA, Left leg tender to palpation. Dressing around amputation clean, dry, and intact. RLE with TMA, DP 2+.     Patient is a 54y old Male s/p left BKA. Stable with some difficulty controlling pain. Tran in place with goo UOP.    - Care per primary team  - Daily dressing change  - d/c tran with good UOP 8/2

## 2019-08-01 NOTE — PROGRESS NOTE ADULT - ASSESSMENT
54M hx HTN, HLD, DM2, ETOH abuse, IVDU (on Suboxone) w/ multiple toe/foot amputations presents with severe infection, admitted for sepsis shock 2/2 R foot wet gangrene. Pt also noted to have BRANT, hyponatremia and anemia. Vascular consult noted and appreciated. Pt to be taken to OR for emergent source control with Guillotine BKA vs possible AKA. ID consult noted and appreciated.     Wet gangrene of R foot w/ OME complicated by septic shock  -Septic shock in ED, s/p IVF resuscitation   -Leukocytosis downtrending -Lactate downtrended to wnl   -Podiatry consult noted   -Vascular consult noted. OR today for BKA vs. AKA.  -ID consult noted. Contact isolation. C/w Vancomycin and Zosyn  -Blood cx pending     BRANT  -Improving   -Likely 2/2 sepsis   -S/p IVF boluses  -C/w IVF and encourage PO hydration     Hyponatremia   -Improving   -Likely 2/2 dehydration due to poor PO intake   -C/w IVF and encourage PO hydration   -Continue to monitor     Hyperkalemia   -Resolved   -Continue to monitor          Problem/Plan - 6:  Problem: Anemia. Plan: patient with hemoglobin of 8.2, last recorded hgb in system is 12.9  pt denies brbpr or melena. Denies excessive bleeding from foot wound  Patient did state had some hematuria last few days, will check UA  based on elevated ferritin with decreased tibc, transferrin suspect may be anemia 2/2 chronic inflammation  continue to trend, tranfuse <8 given pending surgery.     Problem/Plan - 7:  ·  Problem: EKG abnormalities.  Plan: patient with TWI in v4-6 in setting of sinus tachycardia and sepsis, new from ekg in 2016  pt denies chest pain, pressure or prado. denies history of heart disease  initial troponin 0.01  will continue to trend troponins, repeat ekg, admit to telemetry  if trop elevated or dynamic ekg changes will call cardiology.      Problem/Plan - 8:  ·  Problem: DM (diabetes mellitus).  Plan: patient on basaglar 26 u qhs and metformin at home  given patient npo and glucose minimally elevated will give 8 units lantus now and monitor FS with ISS Q6  once patient no longer NPO please titrate lantus and ISS accordingly  check hb a1c.      Problem/Plan - 9:  ·  Problem: Polysubstance abuse.  Plan: hx etoh use and ivdu, however denies recent use  will check drug panel and etoh level  does not appear to have any withdrawal symptoms currently  will treat pain given severity of wound  if etoh level elevated will place on symptom triggered ciwa.      Problem/Plan - 10:  Problem: Need for prophylactic measure. Plan; pt high risk for vte given wound and immobility  however given pending surgery and anemia will hold off on heparin sq for now  please start if hemoglobin stable and patient not pending or s/p surgery  unable to do SCD's given leg wounds. 54M hx HTN, HLD, DM2, ETOH abuse, IVDU (on Suboxone) w/ multiple toe/foot amputations presents with severe infection, admitted for sepsis shock 2/2 R foot wet gangrene. Pt also noted to have BRANT, hyponatremia and anemia. Vascular consult noted and appreciated. Pt to be taken to OR for emergent source control with Guillotine BKA vs possible AKA. ID consult noted and appreciated.     Wet gangrene of R foot w/ OME complicated by septic shock  -Septic shock in ED, s/p IVF resuscitation   -Leukocytosis downtrending -Lactate downtrended to wnl   -Podiatry consult noted   -Vascular consult noted. OR today for BKA vs. AKA.  -ID consult noted. Contact isolation. C/w Vancomycin and Zosyn  -Blood cx pending     BRANT  -Improving   -Likely 2/2 sepsis   -S/p IVF boluses  -C/w IVF and encourage PO hydration     Hyponatremia   -Improving   -Likely 2/2 dehydration due to poor PO intake   -C/w IVF and encourage PO hydration   -Urine lytes wnl   -Continue to monitor     Hyperkalemia   -Resolved   -Continue to monitor     Acute on Chronic Anemia   -Initial Hb 8.3, downtrended to 6.4  -s/p 2U PRBCs  -Iron panel suggest AOCD in setting of current infection         Problem/Plan - 7:  ·  Problem: EKG abnormalities.  Plan: patient with TWI in v4-6 in setting of sinus tachycardia and sepsis, new from ekg in 2016  pt denies chest pain, pressure or prado. denies history of heart disease  initial troponin 0.01  will continue to trend troponins, repeat ekg, admit to telemetry  if trop elevated or dynamic ekg changes will call cardiology.      Problem/Plan - 8:  ·  Problem: DM (diabetes mellitus).  Plan: patient on basaglar 26 u qhs and metformin at home  given patient npo and glucose minimally elevated will give 8 units lantus now and monitor FS with ISS Q6  once patient no longer NPO please titrate lantus and ISS accordingly  check hb a1c.      Problem/Plan - 9:  ·  Problem: Polysubstance abuse.  Plan: hx etoh use and ivdu, however denies recent use  will check drug panel and etoh level  does not appear to have any withdrawal symptoms currently  will treat pain given severity of wound  if etoh level elevated will place on symptom triggered ciwa.      Problem/Plan - 10:  Problem: Need for prophylactic measure. Plan; pt high risk for vte given wound and immobility  however given pending surgery and anemia will hold off on heparin sq for now  please start if hemoglobin stable and patient not pending or s/p surgery  unable to do SCD's given leg wounds. 54M hx HTN, HLD, DM2, ETOH abuse, IVDU (on Suboxone) w/ multiple toe/foot amputations presents with severe infection, admitted for sepsis shock 2/2 R foot wet gangrene. Pt also noted to have BRANT, hyponatremia and anemia. Vascular consult noted and appreciated. Pt to be taken to OR for emergent source control with Guillotine BKA vs possible AKA. ID consult noted and appreciated.     Wet gangrene of R foot w/ OME complicated by septic shock  -Septic shock in ED, s/p IVF resuscitation   -Leukocytosis downtrending -Lactate downtrended to wnl   -Podiatry consult noted   -Vascular consult noted. OR today for BKA vs. AKA.  -ID consult noted. Contact isolation. C/w Vancomycin and Zosyn  -Blood cx pending     BRANT  -Improving   -Likely 2/2 sepsis   -S/p IVF boluses  -C/w IVF and encourage PO hydration     Hyponatremia   -Improving   -Likely 2/2 dehydration due to poor PO intake   -C/w IVF and encourage PO hydration   -Urine lytes wnl   -Continue to monitor     Hyperkalemia   -Resolved   -Continue to monitor     Acute on Chronic Anemia   -Initial Hb 8.3, downtrended to 6.4  -s/p 2U PRBCs  -Iron panel suggest AOCD likely 2/2 to current infection    Abnormal EKG   -Sinus tachycardia w/ T wave inversion in leads V4-6  -Pt is denies CP or SOB  -Trop neg X2  -Continue to monitor     IDDM-2 poorly controlled  -HbA1c 7  -HISS   -Adjust insulin according to POCT     H/o polysubstance use disorder - currently taking Suboxone  -Utox negative  -C/w Suboxone     DVT ppx  -Will hold AC in setting of anemia.     Dispo: Pt s/p BKA. Will need abx for infection. Discharge in 2-3 days. Pending SW and PT consults. 54M hx HTN, HLD, DM2, ETOH abuse, IVDU (on Suboxone) w/ multiple toe/foot amputations presents with severe infection, admitted for sepsis shock 2/2 R foot wet gangrene. Pt also noted to have BRANT, hyponatremia and anemia. Vascular consult noted and appreciated. Pt to be taken to OR for emergent source control with Guillotine BKA vs possible AKA. ID consult noted and appreciated.     Wet gangrene of R foot w/ OME complicated by septic shock  -Septic shock in ED, s/p IVF resuscitation   -Leukocytosis downtrending -Lactate downtrended to wnl   -Podiatry consult noted   -Vascular consult noted. OR today for BKA vs. AKA.  -ID consult noted. Contact isolation. C/w Vancomycin and Zosyn  -Blood cx pending   -X-ray R foot and ankle. Soft tissue swelling, ulcerations w/ diffuse OM    BRANT  -Improving   -Likely 2/2 sepsis   -S/p IVF boluses  -C/w IVF and encourage PO hydration     Hyponatremia   -Improving   -Likely 2/2 dehydration due to poor PO intake   -C/w IVF and encourage PO hydration   -Urine lytes wnl   -Continue to monitor     Hyperkalemia   -Resolved   -Continue to monitor     Acute on Chronic Anemia   -Initial Hb 8.3, downtrended to 6.4  -s/p 2U PRBCs  -Iron panel suggest AOCD likely 2/2 to current infection    Abnormal EKG   -Sinus tachycardia w/ T wave inversion in leads V4-6  -Pt is denies CP or SOB  -Trop neg X2  -Continue to monitor     IDDM-2 poorly controlled  -HbA1c 7  -HISS   -Adjust insulin according to POCT     H/o polysubstance use disorder - currently taking Suboxone  -Utox negative  -C/w Suboxone     DVT ppx  -Will hold AC in setting of anemia.     Dispo: Pt s/p BKA. Will need abx for infection. Discharge in 2-3 days. Pending SW and PT consults. 54M hx HTN, HLD, DM2, ETOH abuse, IVDU (on Suboxone) w/ multiple toe/foot amputations presents with severe infection, admitted for sepsis shock 2/2 R foot wet gangrene. Pt also noted to have BRANT, hyponatremia and anemia. Vascular consult noted and appreciated. Pt to be taken to OR for emergent source control with Guillotine BKA vs possible AKA. ID consult noted and appreciated.     Wet gangrene of R foot w/ OM complicated by septic shock  -Septic shock in ED, s/p IVF resuscitation   -Leukocytosis downtrending -Lactate downtrended to wnl   -Podiatry consult noted   -Vascular consult noted. OR today for BKA vs. AKA.  -ID consult noted. Contact isolation. C/w Vancomycin and Zosyn  -Blood cx pending   -X-ray R foot and ankle. Soft tissue swelling, ulcerations w/ diffuse OM    BRANT  -Improving   -Likely 2/2 sepsis   -S/p IVF boluses  -C/w IVF and encourage PO hydration     Hyponatremia   -Improving   -Likely 2/2 dehydration due to poor PO intake   -C/w IVF and encourage PO hydration   -Urine lytes wnl   -Continue to monitor     Hyperkalemia   -Resolved   -Continue to monitor     Acute on Chronic Anemia   -Initial Hb 8.3, downtrended to 6.4  -s/p 2U PRBCs  -Iron panel suggest AOCD likely 2/2 to current infection    Abnormal EKG   -Sinus tachycardia w/ T wave inversion in leads V4-6  -Pt is denies CP or SOB  -Trop neg X2  -Continue to monitor     IDDM-2 poorly controlled  -HbA1c 7  -HISS   -Adjust insulin according to POCT     H/o polysubstance use disorder - currently taking Suboxone  -Utox negative  -Last dose was yesterday  -Concerns for withdrawal  -PM and Psych consults pending     DVT ppx  -Will hold AC in setting of anemia.     Dispo: Pt s/p BKA. Will need abx for infection. Discharge in 2-3 days. Pending SW, PT, PM and behavorial consults. 54M hx HTN, HLD, DM2, ETOH abuse, IVDU (on Suboxone) w/ multiple toe/foot amputations presents with severe infection, admitted for sepsis shock 2/2 R foot wet gangrene. Pt also noted to have BRANT, hyponatremia and anemia. Vascular consult noted and appreciated. Pt is s/p R Guillotine BKA. ID consult noted and appreciated.     Wet gangrene of R foot w/ OM complicated by septic shock s/p Right BKA  -Septic shock in ED, s/p IVF resuscitation   -Leukocytosis downtrending -Lactate downtrended to wnl   -Podiatry consult noted   -ID consult noted.   -Vascular consult noted. R Guillotine BKA done today.   -ID consult noted. Contact isolation. C/w Vancomycin and Zosyn - Day 1 (extensive tissue involvement noted during surgery). C/w Florastor   -Blood cx pending   -X-ray R foot and ankle. Soft tissue swelling, ulcerations w/ diffuse OM  -Pain control: Tylenol, Toradol 15mg (mod), Dilaudid 1mg (subcut) - severe  -C/w Flexeril     BRANT  -Improving   -Likely 2/2 sepsis   -S/p IVF boluses  -C/w Plasmalyte @75cc/hr for 6hrs   -Encourage PO hydration     Hyponatremia   -Improving   -Likely 2/2 dehydration due to poor PO intake   -C/w IVF and encourage PO hydration   -Urine lytes wnl   -Continue to monitor     Hyperkalemia   -Resolved   -Continue to monitor     Acute on Chronic Anemia   -Initial Hb 8.3, downtrended to 6.4  -s/p 2U PRBCs  -Iron panel suggest AOCD likely 2/2 to current infection    Abnormal EKG   -Sinus tachycardia w/ T wave inversion in leads V4-6  -Pt is denies CP or SOB  -Trop neg X2  -Continue to monitor     IDDM-2 poorly controlled  -HbA1c 7  -Low HISS    HLD  -Will hold Lipitor for now (will re-start on Monday 8/5) to avoid rhabdo     H/o polysubstance use disorder - currently taking Suboxone  -Utox negative  -Last dose of Suboxone was yesterday  -Concerns for withdrawal  -PM and Psych consults pending     Preventative Measures  DVT ppx: Will hold AC for now in setting of anemia.   Supportive: Folic acid, Vitamin B12     Dispo: Pt s/p Right BKA. Will c/w antibiotic due to extensive tissue involvement. Discharge in 3-5 days. Pending SW, PT, PM and behavorial consults. 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 Pt admitted with severe sepsis secondary to wet gangrene, complicated by maggot infestation of wound. Vascular sx consulted and pt s/p wound cleansing and went to the OR for emergent source control and s/p  Guillotine BKA. Post operatively remains stable. Empirically on vanco/ zosyn. Hospital course complicated by acute on chronic anemia in the setting of AOCD, s/p 2 u prbc on admission and 1 u prbc intraop with appropriate response to hgb. FOBT pending. Also noted with BRANT/ hyponatremia likely due to dehydration/sepsis, s/p ivf fluid resuscitation with improving renal function and sodium.     Severe sepsis 2/2 wet gangrene of the RLE with maggot infestation  - s/p emergent source control and s/p  Guillotine BKA   - no intra op complications   - currently with down trending leukocytosis from 18 to 16  - elevated esr/ crp   - afebrile  and normotensive   - f/u B cx  - c/w Zosyn 3.375 g IV q8h and vancomycin   -vanco trough per pharmacy   -X-ray R foot and ankle. Soft tissue swelling, ulcerations w/ diffuse OM  -Pain control: Tylenol, Toradol 15mg (mod), Dilaudid 1mg (subcut) - severe  (pts last dose of suboxone was yest)  - c/w flexeril po tid prn   - ID consult noted and appreciated, d/w Dr. Santa the plan of   - vascular sx consult noted and appreciated, d/w ELIZABETH Hubbard the plan of care   - Podiatry consult noted and appreciated   - Pain mx consulted, stated to avoid narcotics in IV form   - Psych consulted for suboxone     BRANT  - likely secondary to dehydration/ sepsis possible ATN   -Improving renal fxn to wnl   - s/p IVF resuscitation   - maintain on IVF for  6 hours post op   - avoid nephrotoxic medications  - will c/w monitoring renal fxn closely     Hyponatremia   -Improving   sodium 133  -Likely 2/2 dehydration due to poor PO intake   -C/w IVF and encourage PO hydration   -Urine lytes wnl   -Continue to monitor     Hyperkalemia   - initially hemolyzed and repeat was wnl   -Resolved     Acute on Chronic Anemia   - noted AOCD   -Initial Hb 8.3, downtrended to 6.4   - likely bc pt was initially dehydrated   -s/p 2U PRBCs on admision and 1 u prbc intra op   - appropriate response to prbc transfusion   - FOBT pening   -Iron panel suggest AOCD   - will monitor hgb and transufe fro hgb <7     Abnormal EKG   -Sinus tachycardia w/ T wave inversion in leads V4-6  -Pt denies CP or SOB  -Trop neg X2  -Continue to monitor   - f/u tte   - cardiology was consulted on admission     IDDM-2 poorly controlled  -HbA1c 7  -initially pt was npo now on a diet  - fs stable  - c/w accuchecks ACHS TID  - c/w HS   - c/w hypoglycemia protocol     HLD  -Will hold Lipitor for now (will re-start on ) to avoid rhabdo     H/o polysubstance use disorder - currently taking Suboxone  -Utox negative  -Last dose of Suboxone was yesterday  -Concerns for withdrawal  -PM and Psych consults pending     Preventative Measures  DVT ppx: Will hold AC for now in setting of anemia.   Supportive: Folic acid, Vitamin B12     Dispo: Pt s/p Right BKA. Will c/w antibiotic due to extensive tissue involvement. Discharge in 3-5 days. Pending SW, PT, PM and behavorial consults. 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 Pt admitted with severe sepsis secondary to wet gangrene, complicated by maggot infestation of wound. Vascular sx consulted and pt s/p wound cleansing and went to the OR for emergent source control and s/p  Guillotine BKA. Post operatively remains stable. Empirically on vanco/ zosyn. Hospital course complicated by acute on chronic anemia in the setting of AOCD, s/p 2 u prbc on admission and 1 u prbc intraop with appropriate response to hgb. FOBT pending. Also noted with BRANT/ hyponatremia likely due to dehydration/sepsis, s/p ivf fluid resuscitation with improving renal function and sodium.     Severe sepsis 2/2 wet gangrene of the RLE with maggot infestation  - s/p emergent source control and s/p  Guillotine BKA   - no intra op complications   - currently with down trending leukocytosis from 18 to 16  - elevated esr/ crp   - afebrile  and normotensive   - f/u B cx  - c/w Zosyn 3.375 g IV q8h and vancomycin   -vanco trough per pharmacy   -X-ray R foot and ankle. Soft tissue swelling, ulcerations w/ diffuse OM  -Pain control: Tylenol, Toradol 15mg (mod), Dilaudid 1mg (subcut) - severe  (pts last dose of suboxone was yest)  - c/w flexeril po tid prn   - ID consult noted and appreciated, d/w Dr. Santa the plan of   - vascular sx consult noted and appreciated, d/w ELIZABETH Hubbard the plan of care   - Podiatry consult noted and appreciated   - Pain mx consulted, stated to avoid narcotics in IV form   - Psych consulted for suboxone     BRANT  - likely secondary to dehydration/ sepsis possible ATN   -Improving renal fxn to wnl   - s/p IVF resuscitation   - maintain on IVF for  6 hours post op   - avoid nephrotoxic medications  - will c/w monitoring renal fxn closely     Hyponatremia   -Improving   sodium 133  -Likely 2/2 dehydration due to poor PO intake   -C/w IVF and encourage PO hydration   -Urine lytes wnl   -Continue to monitor     Hyperkalemia   - initially hemolyzed and repeat was wnl   -Resolved     Acute on Chronic Anemia   - noted AOCD   -Initial Hb 8.3, downtrended to 6.4   - likely bc pt was initially dehydrated   -s/p 2U PRBCs on admision and 1 u prbc intra op   - appropriate response to prbc transfusion   - FOBT pening   -Iron panel suggest AOCD   - will monitor hgb and transufe fro hgb <7     Abnormal EKG   -Sinus tachycardia w/ T wave inversion in leads V4-6  -Pt denies CP or SOB  -Trop neg X2  -Continue to monitor   - f/u tte   - cardiology was consulted on admission     IDDM-2 poorly controlled  -HbA1c 7  -initially pt was npo now on a diet  - fs stable  - c/w accuchecks ACHS TID  - c/w HS   - c/w hypoglycemia protocol     HLD  -Will hold Lipitor for now (will re-start on ) to avoid rhabdo     H/o polysubstance use disorder - currently taking Suboxone  -Utox negative  -Last dose of Suboxone was yesterday  -Concerns for withdrawal  -PM and Psych consults pending     Preventative Measures  DVT ppx: Will hold AC for now in setting of anemia.   - c/w scd boots to LLE     Advanced Directive: Full code  Next of kin: Brother Jose  Extensive d/w the patient in regards to his GOC, he reports he would want all aggressive measures including CPR/ life support and pressor support if needed. He states that if anything is to happen to him his brother Jose would be the one to make decisions for him.    Advanced care discussion took 32 minutes     Dispo: Pt s/p Right BKA, will need PT consultation. Anticipated LOS 4-5 days pending improvement and pain control. Will likely need FÉLIX.

## 2019-08-01 NOTE — CONSULT NOTE ADULT - SUBJECTIVE AND OBJECTIVE BOX
Vascular Attending:  Dr Joel Peña      HPI:  54M hx HTN, HLD, DM2, ETOH abuse, IVDU (on suboxone), multiple toe/foot amputations presents with severe foot infection. Patient states that lives on own and hasn't been able to leave house for the last month. He states he's friend with delivery people that bring him his food and meds. He's had multiple foot/toe amputations due to diabetic ulcers. The last one was about two years ago. He's noticed this foot wound brewing for a while, but has not sought medical attention because was concerned that would need amputation and thought could take care of it himself. He states for the last month has not mila able to walk on foot. He states for the past few days he's been having worsening bilateral leg and foot pain and has felt very fatigued. Today his brother came to check on him and upon seeing the foot wound made him call EMS and come to ED.     Pt has history of etoh abuse, but states cut back a lot. He states drinks bottle of champagne a couple times a month and last time being two weeks ago. He also initially denied history of IVDU, however when asked about suboxone subscription he states that he's been on suboxone for last 2-3 years.     Upon arriving to ED, patient was had temp of 99.7, heart rate 150 (improved to 126 s/p ivf bolus), bp 86/54 (improved to 97/54), RR- 22 saturating 95% on room air. Patient was found to have diffuse ulcers of foot and ankle with exposed bones/tendons and infested with maggots. Patient given 2.7 bolus, vanc and zosyn. Podiatry washed out wound. (2019 23:37)      PAST MEDICAL & SURGICAL HISTORY:  Opioid abuse  ETOH abuse  Hyperlipidemia  HTN (hypertension)  DM (diabetes mellitus)  Amputation of toe, left, traumatic: amputation of all the toes on the left foot  by surgery  Amputation of great toe, right, traumatic: right great toe removed by surgery      REVIEW OF SYSTEMS  General: fevers, chills, malaise, pain  Skin/Breast: denies	  Ophthalmologic: denies	  ENMT:	denies  Respiratory and Thorax: denies SOB and cough	  Cardiovascular:	denies CP or palps  Gastrointestinal:	denies abd pain/N/V  Genitourinary:	denies  Musculoskeletal:	 as above  Neurological:	denies  Psychiatric: denies  Hematology/Lymphatics:	 denies  Endocrine: denies  Allergic/Immunologic:	denies    MEDICATIONS  (STANDING):  atorvastatin 40 milliGRAM(s) Oral at bedtime  ceFAZolin   IVPB 2000 milliGRAM(s) IV Intermittent once  dextrose 5%. 1000 milliLiter(s) (50 mL/Hr) IV Continuous <Continuous>  dextrose 50% Injectable 12.5 Gram(s) IV Push once  dextrose 50% Injectable 25 Gram(s) IV Push once  dextrose 50% Injectable 25 Gram(s) IV Push once  DULoxetine 20 milliGRAM(s) Oral two times a day  folic acid 1 milliGRAM(s) Oral daily  multivitamin 1 Tablet(s) Oral daily  piperacillin/tazobactam IVPB.. 3.375 Gram(s) IV Intermittent every 8 hours  sodium chloride 0.9%. 1000 milliLiter(s) (85 mL/Hr) IV Continuous <Continuous>  thiamine 100 milliGRAM(s) Oral daily  vancomycin  IVPB 1000 milliGRAM(s) IV Intermittent every 24 hours    MEDICATIONS  (PRN):  dextrose 40% Gel 15 Gram(s) Oral once PRN Blood Glucose LESS THAN 70 milliGRAM(s)/deciliter  glucagon  Injectable 1 milliGRAM(s) IntraMuscular once PRN Glucose LESS THAN 70 milligrams/deciliter  morphine  - Injectable 4 milliGRAM(s) IV Push every 6 hours PRN Severe Pain (7 - 10)  morphine  - Injectable 2 milliGRAM(s) IV Push every 6 hours PRN Moderate Pain (4 - 6)  zolpidem 5 milliGRAM(s) Oral at bedtime PRN Insomnia  zolpidem 5 milliGRAM(s) Oral at bedtime PRN Insomnia      Allergies    Allergy Status Unknown    SOCIAL HISTORY: former etoh abuse, pt claims cut down a lot 6 months ago and hasn't had drink in 2 weeks; former ivdu on suboxone, states has been [place] for 2-3 years. Lives alone  unemployed, formerly in construction work      Vital Signs Last 24 Hrs  T(C): 36.4 (01 Aug 2019 12:05), Max: 37.6 (2019 18:47)  T(F): 97.6 (01 Aug 2019 12:05), Max: 99.7 (2019 18:47)  HR: 102 (01 Aug 2019 12:05) (99 - 150)  BP: 125/68 (01 Aug 2019 12:05) (86/53 - 125/68)  BP(mean): --  RR: 19 (01 Aug 2019 12:05) (18 - 22)  SpO2: 100% (01 Aug 2019 12:05) (95% - 100%)    PHYSICAL EXAM:    Constitutional: Unkempt M in distress sec to pain  Respiratory: CTA  Cardiovascular: tachy S1, S2  Gastrointestinal: obese, soft, ND, NT  Extremities: RLE with 1+ edema and chronic venous stasis changes. R foot with gangrenous changes multiple ulcers to right foot down to tendon and bone at forefoot, lateral foot, medial and lateral ankle, infested with maggots deep to tendon and bone throughout entire foot and ankle, pain to palpation up to post knee; necrotic patches mainly at distal and lateral foot.    Vascular:  DP/PT nonpalpable b/l  Neurological: A & O x3.   Pulses:   Right:                                                                          Left:  FEM [x ]2+ [ ]1+ [ ]doppler                                             FEM [ x]2+ [ ]1+ [ ]doppler    POP [ ]2+ [ ]1+ [ ]doppler  NP                                           POP [ x]2+ [ ]1+ [ ]doppler    DP [ ]2+ [ ]1+ [ ]doppler  NP                                              DP [ ]2+ [ ]1+ [ ]doppler  NP  PT[ ]2+ [ ]1+ [ ]doppler  NP                                                PT [ ]2+ [ ]1+ [ ]doppler  NP      LABS:                        6.4    18.97 )-----------( 260      ( 01 Aug 2019 01:54 )             21.6         126<L>  |  95<L>  |  102.0<H>  ----------------------------<  197<H>  4.9   |  17.0<L>  |  2.13<H>    Ca    7.7<L>      01 Aug 2019 01:54  Phos  5.0         TPro  5.9<L>  /  Alb  1.3<L>  /  TBili  0.5  /  DBili  x   /  AST  13  /  ALT  9   /  AlkPhos  250<H>      PT/INR - ( 2019 19:33 )   PT: 14.0 sec;   INR: 1.21 ratio         PTT - ( 2019 19:33 )  PTT:31.6 sec  Urinalysis Basic - ( 01 Aug 2019 05:22 )    Color: Yellow / Appearance: Clear / S.015 / pH: x  Gluc: x / Ketone: Trace  / Bili: Negative / Urobili: 1 mg/dL   Blood: x / Protein: 15 mg/dL / Nitrite: Negative   Leuk Esterase: Negative / RBC: 0-2 /HPF / WBC 3-5   Sq Epi: x / Non Sq Epi: Few / Bacteria: Moderate        RADIOLOGY & ADDITIONAL STUDIES    Impression and Plan: 53 y/o M who presents with sepsis sec to wet gangrene R foot  Septic shock treated with abx and fluid resucitation  Pt will be taken to the OR for emergent source control with Angie LEW vs possible AKA  Discussed with Dr Joel Peña

## 2019-08-01 NOTE — PROGRESS NOTE ADULT - SUBJECTIVE AND OBJECTIVE BOX
Patient is a 54y old  Male who presents with a chief complaint of sepsis, foot wound (01 Aug 2019 13:18)    Patient seen and examined at beside. No acute events over night. Pt reports that he is okay. Was unable to obtain additional information as pt was very reserved.     ROS: Denies CP, HA, SOB, n/v/c/d, fever/chills, abdominal/back pain, vision changes, numbness/tingling, blood in urine or stool     CAPILLARY BLOOD GLUCOSE  POCT Blood Glucose.: 156 mg/dL (01 Aug 2019 06:02)  POCT Blood Glucose.: 196 mg/dL (01 Aug 2019 02:29)  POCT Blood Glucose.: 135 mg/dL (01 Aug 2019 00:00)  POCT Blood Glucose.: 133 mg/dL (31 Jul 2019 18:59)    Physical Examination  Vital Signs Last 24 Hrs  T(C): 36.4 (01 Aug 2019 12:05), Max: 37.6 (31 Jul 2019 18:47)  T(F): 97.6 (01 Aug 2019 12:05), Max: 99.7 (31 Jul 2019 18:47)  HR: 102 (01 Aug 2019 12:05) (99 - 150)  BP: 125/68 (01 Aug 2019 12:05) (86/53 - 125/68)  RR: 19 (01 Aug 2019 12:05) (18 - 22)  SpO2: 100% (01 Aug 2019 12:05) (95% - 100%)    GENERAL: NAD, well-developed, obese   HEENT: PERRLA, pupil constricted to light b/l   RESP: CTA b/l,no crackles, no wheezing  CVS: RRR, Normal S1 & S2, No m/r/g  Abdomen: +BS, NT, ND  Extremities: R foot w/ ankle involvement- infected ulcer w/ wet gangrenous necrotic tissue and sections of exposed bones & tendons, maggots noted. L foot - s/p amputation   Skin: R foot grossly necrotic wet gangrenous tissue w/ exposed bone & tendons   Neuro: AAOX3, CN II-XII grossly intact       LAB                   8.2    15.83 )-----------( 260      ( 01 Aug 2019 13:34 )             27.2         08-01    132<L>  |  100  |  92.0<H>  ----------------------------<  125<H>  4.8   |  19.0<L>  |  1.52<H>    Ca    8.2<L>      01 Aug 2019 13:34  Phos  5.0     08-01    TPro  6.2<L>  /  Alb  1.5<L>  /  TBili  0.8  /  DBili  x   /  AST  17  /  ALT  12  /  AlkPhos  314<H>  08-01      CARDIAC MARKERS ( 01 Aug 2019 01:54 )  x     / <0.01 ng/mL / x     / x     / x      CARDIAC MARKERS ( 31 Jul 2019 21:07 )  x     / 0.01 ng/mL / 21 U/L / x     / x            LIVER FUNCTIONS - ( 01 Aug 2019 13:34 )  Alb: 1.5 g/dL / Pro: 6.2 g/dL / ALK PHOS: 314 U/L / ALT: 12 U/L / AST: 17 U/L / GGT: x             PT/INR - ( 01 Aug 2019 13:34 )   PT: 14.3 sec;   INR: 1.24 ratio         PTT - ( 01 Aug 2019 13:34 )  PTT:28.8 sec      IMAGING  < from: Xray Ankle Complete 3 Views, Right (07.31.19 @ 22:25) >  IMPRESSION:   Soft tissue swelling, ulcerations consistent with diffuse   osteomyelitis involving the phalanges and metatarsal bones tarsal bones   possibly ankle joint/show bilateral medial malleolus.    < end of copied text >    < from: Xray Foot AP + Lateral + Oblique, Right (07.31.19 @ 22:25) >  MPRESSION:   Soft tissue swelling, ulcerations consistent with diffuse   osteomyelitis involving the phalanges and metatarsal bones tarsal bones   possibly ankle joint/show bilateral medial malleolus.    < end of copied text >    < from: Xray Chest 1 View-PORTABLE IMMEDIATE (07.31.19 @ 20:04) >  Impression:  No evidence of acute cardiopulmonary disease. Allowing for differences in   technique, no significant change since 5/11/2016..    < end of copied text > Patient is a 54y old  Male who presents with a chief complaint of sepsis, foot wound (01 Aug 2019 13:18) requiring emergent sx     Overnight AM events:  Patient seen and examined at beside. No acute events over night. Pt reports that he is upset, does not want to lose his leg but does understand that the infection is very extensive. D/w him at length that he needs emergency sx to remove infected source and that without removal he could lose his life. He understands and agrees with the plan of care. He felt remorseful but always felt judged and was avoiding at all costs the hospital to prevent any further amputation. He was tearful, provided the pt with emotional support. D/w the RN staff who reported that the pts wound was cleaned the best it could have been, maggot infestation with maggots on the floor and trying to crawl out of the ER room.     ROS: Denies CP, HA, SOB, n/v/c/d, fever/chills, abdominal/back pain, vision changes, numbness/tingling, blood in urine or stool     CAPILLARY BLOOD GLUCOSE  POCT Blood Glucose.: 156 mg/dL (01 Aug 2019 06:02)  POCT Blood Glucose.: 196 mg/dL (01 Aug 2019 02:29)  POCT Blood Glucose.: 135 mg/dL (01 Aug 2019 00:00)  POCT Blood Glucose.: 133 mg/dL (31 Jul 2019 18:59)    Physical Examination  Vital Signs Last 24 Hrs  T(C): 36.4 (01 Aug 2019 12:05), Max: 37.6 (31 Jul 2019 18:47)  T(F): 97.6 (01 Aug 2019 12:05), Max: 99.7 (31 Jul 2019 18:47)  HR: 102 (01 Aug 2019 12:05) (99 - 150)  BP: 125/68 (01 Aug 2019 12:05) (86/53 - 125/68)  RR: 19 (01 Aug 2019 12:05) (18 - 22)  SpO2: 100% (01 Aug 2019 12:05) (95% - 100%)    GENERAL: [place]es[place]   WD WN   poor hygiene   HEENT: PERRLA, pupil constricted to light b/l    poor dentition   RESP: CTA b/l,, no crackles, no wheezing or rhonchi   CVS: RRR, Normal S1 & S2, No m/r/g  Abdomen: soft +BS normoactive, NT, ND  Extremities: R foot w/ ankle involvement- infected ulcer w/ wet gangrenous necrotic tissue and sections of exposed bones & tendons, maggots noted. L foot - s/p amputation    Neuro: AAOX3, non focal       LAB                   8.2    15.83 )-----------( 260      ( 01 Aug 2019 13:34 )             27.2         08-01    132<L>  |  100  |  92.0<H>  ----------------------------<  125<H>  4.8   |  19.0<L>  |  1.52<H>    Ca    8.2<L>      01 Aug 2019 13:34  Phos  5.0     08-01    TPro  6.2<L>  /  Alb  1.5<L>  /  TBili  0.8  /  DBili  x   /  AST  17  /  ALT  12  /  AlkPhos  314<H>  08-01      CARDIAC MARKERS ( 01 Aug 2019 01:54 )  x     / <0.01 ng/mL / x     / x     / x      CARDIAC MARKERS ( 31 Jul 2019 21:07 )  x     / 0.01 ng/mL / 21 U/L / x     / x            LIVER FUNCTIONS - ( 01 Aug 2019 13:34 )  Alb: 1.5 g/dL / Pro: 6.2 g/dL / ALK PHOS: 314 U/L / ALT: 12 U/L / AST: 17 U/L / GGT: x             PT/INR - ( 01 Aug 2019 13:34 )   PT: 14.3 sec;   INR: 1.24 ratio         PTT - ( 01 Aug 2019 13:34 )  PTT:28.8 sec      IMAGING  < from: Xray Ankle Complete 3 Views, Right (07.31.19 @ 22:25) >  IMPRESSION:   Soft tissue swelling, ulcerations consistent with diffuse   osteomyelitis involving the phalanges and metatarsal bones tarsal bones   possibly ankle joint/show bilateral medial malleolus.    < end of copied text >    < from: Xray Foot AP + Lateral + Oblique, Right (07.31.19 @ 22:25) >  MPRESSION:   Soft tissue swelling, ulcerations consistent with diffuse   osteomyelitis involving the phalanges and metatarsal bones tarsal bones   possibly ankle joint/show bilateral medial malleolus.    < end of copied text >    < from: Xray Chest 1 View-PORTABLE IMMEDIATE (07.31.19 @ 20:04) >  Impression:  No evidence of acute cardiopulmonary disease. Allowing for differences in   technique, no significant change since 5/11/2016..    < end of copied text >

## 2019-08-02 DIAGNOSIS — F11.10 OPIOID ABUSE, UNCOMPLICATED: ICD-10-CM

## 2019-08-02 DIAGNOSIS — G89.18 OTHER ACUTE POSTPROCEDURAL PAIN: ICD-10-CM

## 2019-08-02 LAB
ALBUMIN SERPL ELPH-MCNC: 1.3 G/DL — LOW (ref 3.3–5.2)
ALP SERPL-CCNC: 534 U/L — HIGH (ref 40–120)
ALT FLD-CCNC: 16 U/L — SIGNIFICANT CHANGE UP
ANION GAP SERPL CALC-SCNC: 9 MMOL/L — SIGNIFICANT CHANGE UP (ref 5–17)
AST SERPL-CCNC: 31 U/L — SIGNIFICANT CHANGE UP
BASOPHILS # BLD AUTO: 0.02 K/UL — SIGNIFICANT CHANGE UP (ref 0–0.2)
BASOPHILS NFR BLD AUTO: 0.2 % — SIGNIFICANT CHANGE UP (ref 0–2)
BILIRUB SERPL-MCNC: 0.8 MG/DL — SIGNIFICANT CHANGE UP (ref 0.4–2)
BUN SERPL-MCNC: 71 MG/DL — HIGH (ref 8–20)
CALCIUM SERPL-MCNC: 8.1 MG/DL — LOW (ref 8.6–10.2)
CHLORIDE SERPL-SCNC: 104 MMOL/L — SIGNIFICANT CHANGE UP (ref 98–107)
CO2 SERPL-SCNC: 21 MMOL/L — LOW (ref 22–29)
CREAT SERPL-MCNC: 0.93 MG/DL — SIGNIFICANT CHANGE UP (ref 0.5–1.3)
CULTURE RESULTS: NO GROWTH — SIGNIFICANT CHANGE UP
EOSINOPHIL # BLD AUTO: 0.17 K/UL — SIGNIFICANT CHANGE UP (ref 0–0.5)
EOSINOPHIL NFR BLD AUTO: 1.8 % — SIGNIFICANT CHANGE UP (ref 0–6)
GLUCOSE BLDC GLUCOMTR-MCNC: 100 MG/DL — HIGH (ref 70–99)
GLUCOSE BLDC GLUCOMTR-MCNC: 107 MG/DL — HIGH (ref 70–99)
GLUCOSE BLDC GLUCOMTR-MCNC: 95 MG/DL — SIGNIFICANT CHANGE UP (ref 70–99)
GLUCOSE BLDC GLUCOMTR-MCNC: 97 MG/DL — SIGNIFICANT CHANGE UP (ref 70–99)
GLUCOSE SERPL-MCNC: 104 MG/DL — SIGNIFICANT CHANGE UP (ref 70–115)
HCT VFR BLD CALC: 26.5 % — LOW (ref 39–50)
HGB BLD-MCNC: 7.9 G/DL — LOW (ref 13–17)
IMM GRANULOCYTES NFR BLD AUTO: 1.8 % — HIGH (ref 0–1.5)
LYMPHOCYTES # BLD AUTO: 1.01 K/UL — SIGNIFICANT CHANGE UP (ref 1–3.3)
LYMPHOCYTES # BLD AUTO: 10.6 % — LOW (ref 13–44)
MAGNESIUM SERPL-MCNC: 1.7 MG/DL — SIGNIFICANT CHANGE UP (ref 1.6–2.6)
MCHC RBC-ENTMCNC: 25.5 PG — LOW (ref 27–34)
MCHC RBC-ENTMCNC: 29.8 GM/DL — LOW (ref 32–36)
MCV RBC AUTO: 85.5 FL — SIGNIFICANT CHANGE UP (ref 80–100)
MONOCYTES # BLD AUTO: 0.87 K/UL — SIGNIFICANT CHANGE UP (ref 0–0.9)
MONOCYTES NFR BLD AUTO: 9.1 % — SIGNIFICANT CHANGE UP (ref 2–14)
NEUTROPHILS # BLD AUTO: 7.31 K/UL — SIGNIFICANT CHANGE UP (ref 1.8–7.4)
NEUTROPHILS NFR BLD AUTO: 76.5 % — SIGNIFICANT CHANGE UP (ref 43–77)
PHOSPHATE SERPL-MCNC: 3.6 MG/DL — SIGNIFICANT CHANGE UP (ref 2.4–4.7)
PLATELET # BLD AUTO: 245 K/UL — SIGNIFICANT CHANGE UP (ref 150–400)
POTASSIUM SERPL-MCNC: 4.5 MMOL/L — SIGNIFICANT CHANGE UP (ref 3.5–5.3)
POTASSIUM SERPL-SCNC: 4.5 MMOL/L — SIGNIFICANT CHANGE UP (ref 3.5–5.3)
PROT SERPL-MCNC: 5.8 G/DL — LOW (ref 6.6–8.7)
RBC # BLD: 3.1 M/UL — LOW (ref 4.2–5.8)
RBC # FLD: 16.4 % — HIGH (ref 10.3–14.5)
SODIUM SERPL-SCNC: 134 MMOL/L — LOW (ref 135–145)
SPECIMEN SOURCE: SIGNIFICANT CHANGE UP
WBC # BLD: 9.55 K/UL — SIGNIFICANT CHANGE UP (ref 3.8–10.5)
WBC # FLD AUTO: 9.55 K/UL — SIGNIFICANT CHANGE UP (ref 3.8–10.5)

## 2019-08-02 PROCEDURE — 99221 1ST HOSP IP/OBS SF/LOW 40: CPT

## 2019-08-02 PROCEDURE — 99232 SBSQ HOSP IP/OBS MODERATE 35: CPT

## 2019-08-02 PROCEDURE — 99233 SBSQ HOSP IP/OBS HIGH 50: CPT | Mod: GC

## 2019-08-02 RX ORDER — ASCORBIC ACID 60 MG
500 TABLET,CHEWABLE ORAL DAILY
Refills: 0 | Status: DISCONTINUED | OUTPATIENT
Start: 2019-08-02 | End: 2019-08-07

## 2019-08-02 RX ORDER — DOCUSATE SODIUM 100 MG
100 CAPSULE ORAL
Refills: 0 | Status: DISCONTINUED | OUTPATIENT
Start: 2019-08-02 | End: 2019-08-04

## 2019-08-02 RX ORDER — HYDROMORPHONE HYDROCHLORIDE 2 MG/ML
4 INJECTION INTRAMUSCULAR; INTRAVENOUS; SUBCUTANEOUS
Refills: 0 | Status: DISCONTINUED | OUTPATIENT
Start: 2019-08-02 | End: 2019-08-06

## 2019-08-02 RX ORDER — HYDROMORPHONE HYDROCHLORIDE 2 MG/ML
6 INJECTION INTRAMUSCULAR; INTRAVENOUS; SUBCUTANEOUS EVERY 6 HOURS
Refills: 0 | Status: DISCONTINUED | OUTPATIENT
Start: 2019-08-02 | End: 2019-08-06

## 2019-08-02 RX ORDER — NYSTATIN CREAM 100000 [USP'U]/G
1 CREAM TOPICAL
Refills: 0 | Status: DISCONTINUED | OUTPATIENT
Start: 2019-08-02 | End: 2019-08-02

## 2019-08-02 RX ORDER — METHOCARBAMOL 500 MG/1
750 TABLET, FILM COATED ORAL EVERY 8 HOURS
Refills: 0 | Status: COMPLETED | OUTPATIENT
Start: 2019-08-02 | End: 2019-08-05

## 2019-08-02 RX ORDER — GABAPENTIN 400 MG/1
100 CAPSULE ORAL THREE TIMES A DAY
Refills: 0 | Status: DISCONTINUED | OUTPATIENT
Start: 2019-08-02 | End: 2019-08-02

## 2019-08-02 RX ORDER — LANOLIN ALCOHOL/MO/W.PET/CERES
5 CREAM (GRAM) TOPICAL ONCE
Refills: 0 | Status: COMPLETED | OUTPATIENT
Start: 2019-08-02 | End: 2019-08-02

## 2019-08-02 RX ORDER — GABAPENTIN 400 MG/1
300 CAPSULE ORAL
Refills: 0 | Status: DISCONTINUED | OUTPATIENT
Start: 2019-08-02 | End: 2019-08-07

## 2019-08-02 RX ORDER — SODIUM CHLORIDE 9 MG/ML
1000 INJECTION, SOLUTION INTRAVENOUS
Refills: 0 | Status: DISCONTINUED | OUTPATIENT
Start: 2019-08-02 | End: 2019-08-04

## 2019-08-02 RX ORDER — HYDROMORPHONE HYDROCHLORIDE 2 MG/ML
1 INJECTION INTRAMUSCULAR; INTRAVENOUS; SUBCUTANEOUS EVERY 6 HOURS
Refills: 0 | Status: DISCONTINUED | OUTPATIENT
Start: 2019-08-02 | End: 2019-08-06

## 2019-08-02 RX ORDER — FERROUS SULFATE 325(65) MG
325 TABLET ORAL
Refills: 0 | Status: DISCONTINUED | OUTPATIENT
Start: 2019-08-02 | End: 2019-08-07

## 2019-08-02 RX ORDER — DOCUSATE SODIUM 100 MG
100 CAPSULE ORAL
Refills: 0 | Status: DISCONTINUED | OUTPATIENT
Start: 2019-08-02 | End: 2019-08-02

## 2019-08-02 RX ADMIN — PIPERACILLIN AND TAZOBACTAM 25 GRAM(S): 4; .5 INJECTION, POWDER, LYOPHILIZED, FOR SOLUTION INTRAVENOUS at 13:39

## 2019-08-02 RX ADMIN — HYDROMORPHONE HYDROCHLORIDE 1 MILLIGRAM(S): 2 INJECTION INTRAMUSCULAR; INTRAVENOUS; SUBCUTANEOUS at 15:21

## 2019-08-02 RX ADMIN — HYDROMORPHONE HYDROCHLORIDE 1 MILLIGRAM(S): 2 INJECTION INTRAMUSCULAR; INTRAVENOUS; SUBCUTANEOUS at 08:09

## 2019-08-02 RX ADMIN — Medication 250 MILLIGRAM(S): at 05:23

## 2019-08-02 RX ADMIN — Medication 100 MILLIGRAM(S): at 13:39

## 2019-08-02 RX ADMIN — SODIUM CHLORIDE 75 MILLILITER(S): 9 INJECTION, SOLUTION INTRAVENOUS at 13:36

## 2019-08-02 RX ADMIN — Medication 1 MILLIGRAM(S): at 13:38

## 2019-08-02 RX ADMIN — Medication 5 MILLIGRAM(S): at 00:39

## 2019-08-02 RX ADMIN — Medication 975 MILLIGRAM(S): at 11:55

## 2019-08-02 RX ADMIN — HYDROMORPHONE HYDROCHLORIDE 1 MILLIGRAM(S): 2 INJECTION INTRAMUSCULAR; INTRAVENOUS; SUBCUTANEOUS at 05:23

## 2019-08-02 RX ADMIN — Medication 975 MILLIGRAM(S): at 10:37

## 2019-08-02 RX ADMIN — Medication 975 MILLIGRAM(S): at 01:09

## 2019-08-02 RX ADMIN — SODIUM CHLORIDE 75 MILLILITER(S): 9 INJECTION, SOLUTION INTRAVENOUS at 21:38

## 2019-08-02 RX ADMIN — HYDROMORPHONE HYDROCHLORIDE 4 MILLIGRAM(S): 2 INJECTION INTRAMUSCULAR; INTRAVENOUS; SUBCUTANEOUS at 23:32

## 2019-08-02 RX ADMIN — GABAPENTIN 300 MILLIGRAM(S): 400 CAPSULE ORAL at 18:19

## 2019-08-02 RX ADMIN — METHOCARBAMOL 750 MILLIGRAM(S): 500 TABLET, FILM COATED ORAL at 21:37

## 2019-08-02 RX ADMIN — HYDROMORPHONE HYDROCHLORIDE 6 MILLIGRAM(S): 2 INJECTION INTRAMUSCULAR; INTRAVENOUS; SUBCUTANEOUS at 18:49

## 2019-08-02 RX ADMIN — HYDROMORPHONE HYDROCHLORIDE 1 MILLIGRAM(S): 2 INJECTION INTRAMUSCULAR; INTRAVENOUS; SUBCUTANEOUS at 14:43

## 2019-08-02 RX ADMIN — Medication 975 MILLIGRAM(S): at 18:21

## 2019-08-02 RX ADMIN — HYDROMORPHONE HYDROCHLORIDE 1 MILLIGRAM(S): 2 INJECTION INTRAMUSCULAR; INTRAVENOUS; SUBCUTANEOUS at 05:56

## 2019-08-02 RX ADMIN — Medication 1 TABLET(S): at 13:38

## 2019-08-02 RX ADMIN — Medication 250 MILLIGRAM(S): at 17:00

## 2019-08-02 RX ADMIN — PIPERACILLIN AND TAZOBACTAM 25 GRAM(S): 4; .5 INJECTION, POWDER, LYOPHILIZED, FOR SOLUTION INTRAVENOUS at 21:37

## 2019-08-02 RX ADMIN — GABAPENTIN 100 MILLIGRAM(S): 400 CAPSULE ORAL at 13:38

## 2019-08-02 RX ADMIN — PIPERACILLIN AND TAZOBACTAM 25 GRAM(S): 4; .5 INJECTION, POWDER, LYOPHILIZED, FOR SOLUTION INTRAVENOUS at 05:23

## 2019-08-02 RX ADMIN — HYDROMORPHONE HYDROCHLORIDE 0.5 MILLIGRAM(S): 2 INJECTION INTRAMUSCULAR; INTRAVENOUS; SUBCUTANEOUS at 00:07

## 2019-08-02 RX ADMIN — Medication 975 MILLIGRAM(S): at 17:00

## 2019-08-02 RX ADMIN — HYDROMORPHONE HYDROCHLORIDE 1 MILLIGRAM(S): 2 INJECTION INTRAMUSCULAR; INTRAVENOUS; SUBCUTANEOUS at 09:17

## 2019-08-02 RX ADMIN — HYDROMORPHONE HYDROCHLORIDE 4 MILLIGRAM(S): 2 INJECTION INTRAMUSCULAR; INTRAVENOUS; SUBCUTANEOUS at 23:01

## 2019-08-02 RX ADMIN — Medication 975 MILLIGRAM(S): at 02:18

## 2019-08-02 RX ADMIN — HYDROMORPHONE HYDROCHLORIDE 6 MILLIGRAM(S): 2 INJECTION INTRAMUSCULAR; INTRAVENOUS; SUBCUTANEOUS at 18:19

## 2019-08-02 RX ADMIN — Medication 250 MILLIGRAM(S): at 17:01

## 2019-08-02 NOTE — CONSULT NOTE ADULT - PROBLEM SELECTOR RECOMMENDATION 9
1. Gabapentin 300mg PO BID  2. Tylenol 975mg TID, ATC x 3 days  3. Dilaudid 4mg PO q4hrs PRN moderate pain  - May offer an alternate dose of Dilaudid 6mg PO q4hrs PRN severe pain  - Dilaudid 1mg subcutaneous q6hrs PRN severe pain peristing 1 hour after oral opioids  4. Robaxin 500mg PO q6hrs ATC  - discontinue Flexeril  5. Continue Ketorolac as ordered.
recall psych once off acute pain meds

## 2019-08-02 NOTE — PROGRESS NOTE ADULT - SUBJECTIVE AND OBJECTIVE BOX
Patient is a 54y old  Male who presents with a chief complaint of sepsis, foot wound (01 Aug 2019 13:18) requiring emergent sx     Overnight AM events:  Patient seen and examined at beside. No acute events over night. POD #1 s/p Right BKA. Pt is complaining of pain 10/10. Pt is tolerating PO diet w/o n/v/d. No BM since admission.     ROS: Denies CP, HA, SOB, n/v/c/d, fever/chills, abdominal/back pain, vision changes, numbness/tingling, blood in urine or stool     CAPILLARY BLOOD GLUCOSE  POCT  Blood Glucose 131 mg/dL (08.01.19 @ 21:16)  POCT Blood Glucose.: 156 mg/dL (01 Aug 2019 06:02)  POCT Blood Glucose.: 196 mg/dL (01 Aug 2019 02:29)  POCT Blood Glucose.: 135 mg/dL (01 Aug 2019 00:00)  POCT Blood Glucose.: 133 mg/dL (31 Jul 2019 18:59)    Physical Examination  Vital Signs Last 24 Hrs  T(C): 36.7 (02 Aug 2019 00:50), Max: 36.7 (01 Aug 2019 15:50)  T(F): 98 (02 Aug 2019 00:50), Max: 98 (01 Aug 2019 15:50)  HR: 109 (02 Aug 2019 04:55) (99 - 117)  BP: 114/73 (02 Aug 2019 04:55) (98/67 - 125/68)  RR: 17 (02 Aug 2019 00:50) (14 - 20)  SpO2: 92% (02 Aug 2019 00:50) (92% - 100%)    GENERAL: Obese, WD WN, poor hygiene   HEENT: PERRLA, pupil constricted to light b/l, poor dentition   RESP: CTA b/l,, no crackles, no wheezing or rhonchi   CVS: RRR, Normal S1 & S2, No m/r/g  Abdomen: soft +BS normoactive, NT, ND  Extremities: R foot s/p AKA - in dressing, L foot - s/p partial amputation of foot and complete amputation of toes    Neuro: AAOX3, non focal       LAB                   8.2    15.83 )-----------( 260      ( 01 Aug 2019 13:34 )             27.2         08-01    132<L>  |  100  |  92.0<H>  ----------------------------<  125<H>  4.8   |  19.0<L>  |  1.52<H>    Ca    8.2<L>      01 Aug 2019 13:34  Phos  5.0     08-01    TPro  6.2<L>  /  Alb  1.5<L>  /  TBili  0.8  /  DBili  x   /  AST  17  /  ALT  12  /  AlkPhos  314<H>  08-01      CARDIAC MARKERS ( 01 Aug 2019 01:54 )  x     / <0.01 ng/mL / x     / x     / x      CARDIAC MARKERS ( 31 Jul 2019 21:07 )  x     / 0.01 ng/mL / 21 U/L / x     / x            LIVER FUNCTIONS - ( 01 Aug 2019 13:34 )  Alb: 1.5 g/dL / Pro: 6.2 g/dL / ALK PHOS: 314 U/L / ALT: 12 U/L / AST: 17 U/L / GGT: x             PT/INR - ( 01 Aug 2019 13:34 )   PT: 14.3 sec;   INR: 1.24 ratio         PTT - ( 01 Aug 2019 13:34 )  PTT:28.8 sec      IMAGING  < from: Xray Ankle Complete 3 Views, Right (07.31.19 @ 22:25) >  IMPRESSION:   Soft tissue swelling, ulcerations consistent with diffuse   osteomyelitis involving the phalanges and metatarsal bones tarsal bones   possibly ankle joint/show bilateral medial malleolus.    < end of copied text >    < from: Xray Foot AP + Lateral + Oblique, Right (07.31.19 @ 22:25) >  MPRESSION:   Soft tissue swelling, ulcerations consistent with diffuse   osteomyelitis involving the phalanges and metatarsal bones tarsal bones   possibly ankle joint/show bilateral medial malleolus.    < end of copied text >    < from: Xray Chest 1 View-PORTABLE IMMEDIATE (07.31.19 @ 20:04) >  Impression:  No evidence of acute cardiopulmonary disease. Allowing for differences in   technique, no significant change since 5/11/2016..    < end of copied text > Patient is a 54y old  Male who presents with a chief complaint of sepsis, foot wound (01 Aug 2019 13:18) requiring emergent sx     Overnight AM events:  Patient seen and examined at beside. No acute events over night. POD #1 s/p Right BKA. Pt is complaining of pain 10/10. Pt is tolerating PO diet w/o n/v/d. No BM since admission.     ROS: Denies CP, HA, SOB, n/v/c/d, fever/chills, abdominal/back pain, vision changes, numbness/tingling, blood in urine or stool     CAPILLARY BLOOD GLUCOSE  POCT  Blood Glucose 131 mg/dL (08.01.19 @ 21:16)  POCT Blood Glucose.: 156 mg/dL (01 Aug 2019 06:02)  POCT Blood Glucose.: 196 mg/dL (01 Aug 2019 02:29)  POCT Blood Glucose.: 135 mg/dL (01 Aug 2019 00:00)  POCT Blood Glucose.: 133 mg/dL (31 Jul 2019 18:59)    Physical Examination  Vital Signs Last 24 Hrs  T(C): 36.7 (02 Aug 2019 00:50), Max: 36.7 (01 Aug 2019 15:50)  T(F): 98 (02 Aug 2019 00:50), Max: 98 (01 Aug 2019 15:50)  HR: 109 (02 Aug 2019 04:55) (99 - 117)  BP: 114/73 (02 Aug 2019 04:55) (98/67 - 125/68)  RR: 17 (02 Aug 2019 00:50) (14 - 20)  SpO2: 92% (02 Aug 2019 00:50) (92% - 100%)    GENERAL: Obese, WD WN, poor hygiene   HEENT: PERRLA, pupil constricted to light b/l, poor dentition   RESP: CTA b/l,, no crackles, no wheezing or rhonchi   CVS: RRR, Normal S1 & S2, No m/r/g  Abdomen: soft +BS normoactive, NT, ND, b/l groin intertriginous dermatitis - no signs of infection   Extremities: R foot s/p AKA - in dressing, L foot - s/p partial amputation of foot and complete amputation of toes    Neuro: AAOX3, non focal       LAB                   8.2    15.83 )-----------( 260      ( 01 Aug 2019 13:34 )             27.2         08-01    132<L>  |  100  |  92.0<H>  ----------------------------<  125<H>  4.8   |  19.0<L>  |  1.52<H>    Ca    8.2<L>      01 Aug 2019 13:34  Phos  5.0     08-01    TPro  6.2<L>  /  Alb  1.5<L>  /  TBili  0.8  /  DBili  x   /  AST  17  /  ALT  12  /  AlkPhos  314<H>  08-01      CARDIAC MARKERS ( 01 Aug 2019 01:54 )  x     / <0.01 ng/mL / x     / x     / x      CARDIAC MARKERS ( 31 Jul 2019 21:07 )  x     / 0.01 ng/mL / 21 U/L / x     / x            LIVER FUNCTIONS - ( 01 Aug 2019 13:34 )  Alb: 1.5 g/dL / Pro: 6.2 g/dL / ALK PHOS: 314 U/L / ALT: 12 U/L / AST: 17 U/L / GGT: x             PT/INR - ( 01 Aug 2019 13:34 )   PT: 14.3 sec;   INR: 1.24 ratio         PTT - ( 01 Aug 2019 13:34 )  PTT:28.8 sec      IMAGING  < from: Xray Ankle Complete 3 Views, Right (07.31.19 @ 22:25) >  IMPRESSION:   Soft tissue swelling, ulcerations consistent with diffuse   osteomyelitis involving the phalanges and metatarsal bones tarsal bones   possibly ankle joint/show bilateral medial malleolus.    < end of copied text >    < from: Xray Foot AP + Lateral + Oblique, Right (07.31.19 @ 22:25) >  MPRESSION:   Soft tissue swelling, ulcerations consistent with diffuse   osteomyelitis involving the phalanges and metatarsal bones tarsal bones   possibly ankle joint/show bilateral medial malleolus.    < end of copied text >    < from: Xray Chest 1 View-PORTABLE IMMEDIATE (07.31.19 @ 20:04) >  Impression:  No evidence of acute cardiopulmonary disease. Allowing for differences in   technique, no significant change since 5/11/2016..    < end of copied text > Patient is a 54y old  Male who presents with a chief complaint of sepsis, foot wound (01 Aug 2019 13:18) s/p BKA controlling pain     Overnight AM events:  Patient seen and examined at beside. No acute events over night. POD #1 s/p Right BKA. Pt is complaining of pain 10/10. Aside from this reports he is doing well just needs to rest. Pt is tolerating PO diet w/o n/v/d. No BM since admission.     ROS: Denies CP, HA, SOB, n/v/c/d, fever/chills, abdominal/back pain, vision changes, numbness/tingling, blood in urine or stool     CAPILLARY BLOOD GLUCOSE  POCT  Blood Glucose 131 mg/dL (08.01.19 @ 21:16)  POCT Blood Glucose.: 156 mg/dL (01 Aug 2019 06:02)  POCT Blood Glucose.: 196 mg/dL (01 Aug 2019 02:29)  POCT Blood Glucose.: 135 mg/dL (01 Aug 2019 00:00)  POCT Blood Glucose.: 133 mg/dL (31 Jul 2019 18:59)    Physical Examination  Vital Signs Last 24 Hrs  T(C): 36.7 (02 Aug 2019 00:50), Max: 36.7 (01 Aug 2019 15:50)  T(F): 98 (02 Aug 2019 00:50), Max: 98 (01 Aug 2019 15:50)  HR: 109 (02 Aug 2019 04:55) (99 - 117)  BP: 114/73 (02 Aug 2019 04:55) (98/67 - 125/68)  RR: 17 (02 Aug 2019 00:50) (14 - 20)  SpO2: 92% (02 Aug 2019 00:50) (92% - 100%)    GENERAL: Obese, WD WN, poor hygiene   HEENT: PERRLA, pupil constricted to light b/l, poor dentition   RESP: CTA b/l,, no crackles, no wheezing or rhonchi   CVS: RRR, Normal S1 & S2, No m/r/g  Abdomen: soft +BS normoactive, NT, ND, b/l groin intertriginous dermatitis - no signs of infection   Extremities: R foot s/p BKA with dressing C/D/I, L foot - s/p partial amputation of foot clean dry no evidence of infection   Neuro: AAOX3, non focal       LAB                   8.2    15.83 )-----------( 260      ( 01 Aug 2019 13:34 )             27.2         08-01    132<L>  |  100  |  92.0<H>  ----------------------------<  125<H>  4.8   |  19.0<L>  |  1.52<H>    Ca    8.2<L>      01 Aug 2019 13:34  Phos  5.0     08-01    TPro  6.2<L>  /  Alb  1.5<L>  /  TBili  0.8  /  DBili  x   /  AST  17  /  ALT  12  /  AlkPhos  314<H>  08-01      CARDIAC MARKERS ( 01 Aug 2019 01:54 )  x     / <0.01 ng/mL / x     / x     / x      CARDIAC MARKERS ( 31 Jul 2019 21:07 )  x     / 0.01 ng/mL / 21 U/L / x     / x            LIVER FUNCTIONS - ( 01 Aug 2019 13:34 )  Alb: 1.5 g/dL / Pro: 6.2 g/dL / ALK PHOS: 314 U/L / ALT: 12 U/L / AST: 17 U/L / GGT: x             PT/INR - ( 01 Aug 2019 13:34 )   PT: 14.3 sec;   INR: 1.24 ratio         PTT - ( 01 Aug 2019 13:34 )  PTT:28.8 sec      IMAGING  < from: Xray Ankle Complete 3 Views, Right (07.31.19 @ 22:25) >  IMPRESSION:   Soft tissue swelling, ulcerations consistent with diffuse   osteomyelitis involving the phalanges and metatarsal bones tarsal bones   possibly ankle joint/show bilateral medial malleolus.    < end of copied text >    < from: Xray Foot AP + Lateral + Oblique, Right (07.31.19 @ 22:25) >  MPRESSION:   Soft tissue swelling, ulcerations consistent with diffuse   osteomyelitis involving the phalanges and metatarsal bones tarsal bones   possibly ankle joint/show bilateral medial malleolus.    < end of copied text >    < from: Xray Chest 1 View-PORTABLE IMMEDIATE (07.31.19 @ 20:04) >  Impression:  No evidence of acute cardiopulmonary disease. Allowing for differences in   technique, no significant change since 5/11/2016..    < end of copied text >

## 2019-08-02 NOTE — CONSULT NOTE ADULT - SUBJECTIVE AND OBJECTIVE BOX
s/p BKA now with acute post operative pain Patient asks that full assessment be deferred until next week as " I couldn't sleep The pain is bad - I can't talk now"  PHP I-STOP query indicates last Suboxone Rx in April 17 2019 this year.  ROS Limb pain  Vital Signs Last 24 Hrs  T(C): 36.7 (02 Aug 2019 07:57), Max: 36.7 (01 Aug 2019 15:50)  T(F): 98 (02 Aug 2019 07:57), Max: 98 (01 Aug 2019 15:50)  HR: 108 (02 Aug 2019 07:57) (101 - 117)  BP: 138/81 (02 Aug 2019 07:57) (98/67 - 138/81)  RR: 18 (02 Aug 2019 07:57) (14 - 18)  SpO2: 94% (02 Aug 2019 07:57) (92% - 100%)  PAST MEDICAL & SURGICAL HISTORY:  Opioid abuse  ETOH abuse  Hyperlipidemia  HTN (hypertension)  DM (diabetes mellitus)  Amputation of toe, left, traumatic: amputation of all the toes on the left foot  by surgery  Amputation of great toe, right, traumatic: right great toe removed by surgery  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 975 milliGRAM(s) Oral every 8 hours  dextrose 5%. 1000 milliLiter(s) (50 mL/Hr) IV Continuous <Continuous>  dextrose 50% Injectable 12.5 Gram(s) IV Push once  dextrose 50% Injectable 25 Gram(s) IV Push once  dextrose 50% Injectable 25 Gram(s) IV Push once  folic acid 1 milliGRAM(s) Oral daily  gabapentin 100 milliGRAM(s) Oral three times a day  insulin lispro (HumaLOG) corrective regimen sliding scale   SubCutaneous three times a day before meals  multiple electrolytes Injection Type 1 1000 milliLiter(s) (75 mL/Hr) IV Continuous <Continuous>  multivitamin 1 Tablet(s) Oral daily  nystatin Powder 1 Application(s) Topical two times a day  nystatin Powder 1 Application(s) Topical two times a day  piperacillin/tazobactam IVPB.. 3.375 Gram(s) IV Intermittent every 8 hours  saccharomyces boulardii 250 milliGRAM(s) Oral two times a day  thiamine 100 milliGRAM(s) Oral daily  vancomycin  IVPB 1000 milliGRAM(s) IV Intermittent every 12 hours  Home Medications:  amLODIPine 10 mg oral tablet: 1 tab(s) orally once a day (31 Jul 2019 23:40)  Basaglar KwikPen 100 units/mL subcutaneous solution: 26 unit(s) subcutaneous once a day (at bedtime) (31 Jul 2019 23:40)  Crestor 10 mg oral tablet: 1 tab(s) orally once a day (at bedtime) (31 Jul 2019 23:40)  DULoxetine 20 mg oral delayed release capsule: 1 cap(s) orally 2 times a day (31 Jul 2019 23:40)  enalapril 20 mg oral tablet: 1 tab(s) orally once a day (31 Jul 2019 23:40)  metFORMIN 1000 mg oral tablet: 1 tab(s) orally 2 times a day (31 Jul 2019 23:40)  Multiple Vitamins oral tablet: 1 tab(s) orally once a day (31 Jul 2019 23:40)  Suboxone 8 mg-2 mg sublingual tablet: 1 tab(s) sublingual 3 times a day (31 Jul 2019 23:40)  zolpidem 10 mg oral tablet: 1 tab(s) orally once a day (at bedtime) (31 Jul 2019 23:40)

## 2019-08-02 NOTE — PROGRESS NOTE ADULT - SUBJECTIVE AND OBJECTIVE BOX
[facility]  INFECTIOUS DISEASES AND INTERNAL MEDICINE at [place]  =======================================================  Rupesh Santa MD  Diplomates American Board of Internal Medicine and Infectious Diseases  Tel: [phone]      Fax: [phone]  =======================================================    MATTHEWBREANNA IVERSON [id number]    Follow up: maggot infestation of wound, s/p BKA POD #1. feels "lousy"    Allergies:  Allergy Status Unknown      Medications:  acetaminophen   Tablet .. 975 milliGRAM(s) Oral every 8 hours  cyclobenzaprine 5 milliGRAM(s) Oral three times a day PRN  dextrose 40% Gel 15 Gram(s) Oral once PRN  dextrose 5%. 1000 milliLiter(s) IV Continuous <Continuous>  dextrose 50% Injectable 12.5 Gram(s) IV Push once  dextrose 50% Injectable 25 Gram(s) IV Push once  dextrose 50% Injectable 25 Gram(s) IV Push once  folic acid 1 milliGRAM(s) Oral daily  glucagon  Injectable 1 milliGRAM(s) IntraMuscular once PRN  HYDROmorphone  Injectable 1 milliGRAM(s) SubCutaneous every 3 hours PRN  HYDROmorphone  Injectable 0.5 milliGRAM(s) IV Push every 15 minutes PRN  insulin lispro (HumaLOG) corrective regimen sliding scale   SubCutaneous three times a day before meals  ketorolac 15 milliGRAM(s) Oral every 8 hours PRN  multivitamin 1 Tablet(s) Oral daily  piperacillin/tazobactam IVPB.. 3.375 Gram(s) IV Intermittent every 8 hours  saccharomyces boulardii 250 milliGRAM(s) Oral two times a day  thiamine 100 milliGRAM(s) Oral daily  vancomycin  IVPB 1000 milliGRAM(s) IV Intermittent every 12 hours            REVIEW OF SYSTEMS:  CONSTITUTIONAL:  No Fever or chills  HEENT:   No diplopia or blurred vision.  No earache, sore throat or runny nose.  CARDIOVASCULAR:  No pressure, squeezing, strangling, tightness, heaviness or aching about the chest, neck, axilla or epigastrium.  RESPIRATORY:  No cough, shortness of breath  GASTROINTESTINAL:  No nausea, vomiting or diarrhea.  GENITOURINARY:  No dysuria, frequency or urgency. No Blood in urine  MUSCULOSKELETAL:  no joint aches, no muscle pain  SKIN:  No change in skin, hair or nails.  NEUROLOGIC:  No Headaches, seizures or weakness.  PSYCHIATRIC:  No disorder of thought or mood.  ENDOCRINE:  No heat or cold intolerance  HEMATOLOGICAL:  No easy bruising or bleeding.            Physical Exam:  ICU Vital Signs Last 24 Hrs  T(C): 36.7 (02 Aug 2019 07:57), Max: 36.7 (01 Aug 2019 15:50)  T(F): 98 (02 Aug 2019 07:57), Max: 98 (01 Aug 2019 15:50)  HR: 108 (02 Aug 2019 07:57) (101 - 117)  BP: 138/81 (02 Aug 2019 07:57) (98/67 - 138/81)  BP(mean): --  ABP: --  ABP(mean): --  RR: 18 (02 Aug 2019 07:57) (14 - 18)  SpO2: 94% (02 Aug 2019 07:57) (92% - 100%)      GEN: NAD, pleasant  HEENT: normocephalic and atraumatic. EOMI. PERRL.  Anicteric   NECK: Supple.   LUNGS: Clear to auscultation.  HEART: Regular rate and rhythm without murmur.  ABDOMEN: Soft, nontender, and nondistended.  Positive bowel sounds.    : No CVA tenderness  EXTREMITIES: Without any edema.  MSK: no joint swelling  NEUROLOGIC: Cranial nerves II through XII are grossly intact. No focal deficits  PSYCHIATRIC: Appropriate affect .  SKIN: RT LE dressing clean dry intact, left TMA stump intact      Labs:      134<L>  |  104  |  71.0<H>  ----------------------------<  104  4.5   |  21.0<L>  |  0.93    Ca    8.1<L>      02 Aug 2019 07:43  Phos  3.6       Mg     1.7         TPro  5.8<L>  /  Alb  1.3<L>  /  TBili  0.8  /  DBili  x   /  AST  31  /  ALT  16  /  AlkPhos  534<H>                            7.9    9.55  )-----------( 245      ( 02 Aug 2019 07:43 )             26.5       PT/INR - ( 01 Aug 2019 13:34 )   PT: 14.3 sec;   INR: 1.24 ratio         PTT - ( 01 Aug 2019 13:34 )  PTT:28.8 sec  Urinalysis Basic - ( 01 Aug 2019 05:22 )    Color: Yellow / Appearance: Clear / S.015 / pH: x  Gluc: x / Ketone: Trace  / Bili: Negative / Urobili: 1 mg/dL   Blood: x / Protein: 15 mg/dL / Nitrite: Negative   Leuk Esterase: Negative / RBC: 0-2 /HPF / WBC 3-5   Sq Epi: x / Non Sq Epi: Few / Bacteria: Moderate      LIVER FUNCTIONS - ( 02 Aug 2019 07:43 )  Alb: 1.3 g/dL / Pro: 5.8 g/dL / ALK PHOS: 534 U/L / ALT: 16 U/L / AST: 31 U/L / GGT: x           CARDIAC MARKERS ( 01 Aug 2019 01:54 )  x     / <0.01 ng/mL / x     / x     / x      CARDIAC MARKERS ( 2019 21:07 )  x     / 0.01 ng/mL / 21 U/L / x     / x          CAPILLARY BLOOD GLUCOSE      POCT Blood Glucose.: 107 mg/dL (02 Aug 2019 12:15)  POCT Blood Glucose.: 95 mg/dL (02 Aug 2019 08:14)  POCT Blood Glucose.: 131 mg/dL (01 Aug 2019 21:16)  POCT Blood Glucose.: 155 mg/dL (01 Aug 2019 18:13)        RECENT CULTURES:   @ 05:26 .Urine     No growth

## 2019-08-02 NOTE — PROGRESS NOTE ADULT - SUBJECTIVE AND OBJECTIVE BOX
Patient is a 54y old  Male who presents with a chief complaint of sepsis, foot wound (02 Aug 2019 07:43)    Pt is S/P emergent R Chanellewongisabela LEW            POD# 1  Pt c/o pain in R stump and reports some phantom pain as well. No other complaints offered  Premedicated for dressing change     Vital Signs Last 24 Hrs  T(C): 36.7 (02 Aug 2019 07:57), Max: 36.7 (01 Aug 2019 15:50)  T(F): 98 (02 Aug 2019 07:57), Max: 98 (01 Aug 2019 15:50)  HR: 108 (02 Aug 2019 07:57) (101 - 117)  BP: 138/81 (02 Aug 2019 07:57) (98/67 - 138/81)  BP(mean): --  RR: 18 (02 Aug 2019 07:57) (14 - 20)  SpO2: 94% (02 Aug 2019 07:57) (92% - 100%)  I&O's Detail    01 Aug 2019 07:01  -  02 Aug 2019 07:00  --------------------------------------------------------  IN:    lactated ringers.: 125 mL    multiple electrolytes Injection Type 1multiple electrolytes Injection Type 1: 450 mL    Oral Fluid: 610 mL    Solution: 250 mL    Solution: 200 mL  Total IN: 1635 mL    OUT:    Indwelling Catheter - Urethral: 1500 mL  Total OUT: 1500 mL    Total NET: 135 mL    MEDICATIONS  (STANDING):  acetaminophen   Tablet .. 975 milliGRAM(s) Oral every 8 hours  dextrose 5%. 1000 milliLiter(s) (50 mL/Hr) IV Continuous <Continuous>  dextrose 50% Injectable 12.5 Gram(s) IV Push once  dextrose 50% Injectable 25 Gram(s) IV Push once  dextrose 50% Injectable 25 Gram(s) IV Push once  folic acid 1 milliGRAM(s) Oral daily  insulin lispro (HumaLOG) corrective regimen sliding scale   SubCutaneous three times a day before meals  multivitamin 1 Tablet(s) Oral daily  piperacillin/tazobactam IVPB.. 3.375 Gram(s) IV Intermittent every 8 hours  saccharomyces boulardii 250 milliGRAM(s) Oral two times a day  thiamine 100 milliGRAM(s) Oral daily  vancomycin  IVPB 1000 milliGRAM(s) IV Intermittent every 12 hours    MEDICATIONS  (PRN):  cyclobenzaprine 5 milliGRAM(s) Oral three times a day PRN Muscle Spasm  dextrose 40% Gel 15 Gram(s) Oral once PRN Blood Glucose LESS THAN 70 milliGRAM(s)/deciliter  glucagon  Injectable 1 milliGRAM(s) IntraMuscular once PRN Glucose LESS THAN 70 milligrams/deciliter  HYDROmorphone  Injectable 1 milliGRAM(s) SubCutaneous every 3 hours PRN Severe Pain (7 - 10)  HYDROmorphone  Injectable 0.5 milliGRAM(s) IV Push every 15 minutes PRN Severe Pain (7 - 10)  ketorolac 15 milliGRAM(s) Oral every 8 hours PRN Moderate Pain (4 - 6)    PAST MEDICAL & SURGICAL HISTORY:  Opioid abuse  ETOH abuse  Hyperlipidemia  HTN (hypertension)  DM (diabetes mellitus)  Amputation of toe, left, traumatic: amputation of all the toes on the left foot  by surgery  Amputation of great toe, right, traumatic: right great toe removed by surgery    Physical Exam:  General: NAD  Pulmonary: Nonlabored breathing, CTA diminshed throughout  Cardiovascular: Normal S1, S2  Abdominal: soft, NT/ND  Extremities: R BKA dressing removed. Wound is pink, clean with appropriate areas of bleeding, bone appears healthy. W-DSD reapplied with ACE for gentle compression, elevated and attempted to maintained straight      LABS:                        7.9    9.55  )-----------( 245      ( 02 Aug 2019 07:43 )             26.5     08-02    134<L>  |  104  |  71.0<H>  ----------------------------<  104  4.5   |  21.0<L>  |  0.93    Ca    8.1<L>      02 Aug 2019 07:43  Phos  3.6     08-02  Mg     1.7     08-02    TPro  5.8<L>  /  Alb  1.3<L>  /  TBili  0.8  /  DBili  x   /  AST  31  /  ALT  16  /  AlkPhos  534<H>  08-02    PT/INR - ( 01 Aug 2019 13:34 )   PT: 14.3 sec;   INR: 1.24 ratio         PTT - ( 01 Aug 2019 13:34 )  PTT:28.8 sec  CAPILLARY BLOOD GLUCOSE      POCT Blood Glucose.: 95 mg/dL (02 Aug 2019 08:14)  POCT Blood Glucose.: 131 mg/dL (01 Aug 2019 21:16)  POCT Blood Glucose.: 155 mg/dL (01 Aug 2019 18:13)      Radiology and Additional Studies:    Assessment:54yMaleHPI:  54M hx HTN, HLD, DM2, ETOH abuse, IVDU (on suboxone), multiple toe/foot amputations presents with severe foot infection. Patient states that lives on own and hasn't been able to leave house for the last month. He states he's friend with delivery people that bring him his food and meds. He's had multiple foot/toe amputations due to diabetic ulcers. The last one was about two years ago. He's noticed this foot wound brewing for a while, but has not sought medical attention because was concerned that would need amputation and thought could take care of it himself. He states for the last month has not mila able to walk on foot. He states for the past few days he's been having worsening bilateral leg and foot pain and has felt very fatigued. Today his brother came to check on him and upon seeing the foot wound made him call EMS and come to ED.     Pt has history of etoh abuse, but states cut back a lot. He states drinks bottle of champagne a couple times a month and last time being two weeks ago. He also initially denied history of IVDU, however when asked about suboxone subscription he states that he's been on suboxone for last 2-3 years.     Upon arriving to ED, patient was had temp of 99.7, heart rate 150 (improved to 126 s/p ivf bolus), bp 86/54 (improved to 97/54), RR- 22 saturating 95% on room air. Patient was found to have diffuse ulcers of foot and ankle with exposed bones/tendons and infested with maggots. Patient given 2.7 bolus, vanc and zosyn. Podiatry washed out wound. (31 Jul 2019 23:37)  S/P emergent R Angie LEW            POD# 1    Plan:  Daily dressing changes by vascular surgery  Maintain R BKA elevated and will place immobilizer to help prevent contracture  Pain management. Pt reports he sees Dr Pierce in [place] for his suboxone. Could not provide phone # or address at this time. Consider engaging pain management service for assistance with his pain needs.  Consider toradol and gabapentin  Will RTOR next week for possible revision vs wound vac placement and future skin grafting

## 2019-08-02 NOTE — PROGRESS NOTE ADULT - ASSESSMENT
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 Pt admitted with severe sepsis secondary to wet gangrene, complicated by maggot infestation of wound. Vascular sx consulted and pt s/p wound cleansing and went to the OR for emergent source control and s/p  Guillotine BKA. Post operatively day #1 remains stable. Empirically on vanco/ zosyn. Hospital course complicated by acute on chronic anemia in the setting of AOCD, s/p 2 u prbc on admission and 1 u prbc intraop with appropriate response to hgb. FOBT pending. Also noted with BRANT/ hyponatremia likely due to dehydration/sepsis, s/p ivf fluid resuscitation with improving renal function and sodium.     Severe sepsis 2/2 wet gangrene of the RLE with maggot infestation  - s/p emergent source control and s/p Guillotine BKA POD #1  - no intra op complications   - currently with down trending leukocytosis from 18 to 16  - elevated esr/ crp   - afebrile  and normotensive   - f/u B cx  - c/w Zosyn 3.375 g IV q8h and vancomycin   -vanco trough per pharmacy   -X-ray R foot and ankle. Soft tissue swelling, ulcerations w/ diffuse OM  -Pain control: Tylenol, Toradol 15mg (mod), Dilaudid 1mg (subcut) - severe  (pts last dose of suboxone was yest)  - c/w flexeril po tid prn   - ID consult noted and appreciated, d/w Dr. Santa the plan of   - vascular sx consult noted and appreciated, d/w ELIZABETH Hubbard the plan of care   - Podiatry consult noted and appreciated   - Pain mx consulted, stated to avoid narcotics in IV form   - Psych consulted for suboxone     BRANT  - likely secondary to dehydration/ sepsis possible ATN   -Improving renal fxn to wnl   - s/p IVF resuscitation   - maintain on IVF for  6 hours post op   - avoid nephrotoxic medications  - will c/w monitoring renal fxn closely     Hyponatremia   -Improving   sodium 133  -Likely 2/2 dehydration due to poor PO intake   -C/w IVF and encourage PO hydration   -Urine lytes wnl   -Continue to monitor     Hyperkalemia   - initially hemolyzed and repeat was wnl   -Resolved     Acute on Chronic Anemia   - noted AOCD   -Initial Hb 8.3, downtrended to 6.4   - likely bc pt was initially dehydrated   -s/p 2U PRBCs on admision and 1 u prbc intra op   - appropriate response to prbc transfusion   - FOBT pening   -Iron panel suggest AOCD   - will monitor hgb and transufe fro hgb <7     Abnormal EKG   -Sinus tachycardia w/ T wave inversion in leads V4-6  -Pt denies CP or SOB  -Trop neg X2  -Continue to monitor   - f/u tte   - cardiology was consulted on admission     IDDM-2 poorly controlled  -HbA1c 7  -initially pt was npo now on a diet  - fs stable  - c/w accuchecks ACHS TID  - c/w HS   - c/w hypoglycemia protocol     HLD  -Will hold Lipitor for now (will re-start on ) to avoid rhabdo     H/o polysubstance use disorder - currently taking Suboxone  -Utox negative  -Last dose of Suboxone was yesterday  -Concerns for withdrawal  -PM and Psych consults pending     Preventative Measures  DVT ppx: Will hold AC for now in setting of anemia.   - c/w scd boots to LLE     Advanced Directive: Full code  Next of kin: Brother Jose  Extensive d/w the patient in regards to his GOC, he reports he would want all aggressive measures including CPR/ life support and pressor support if needed. He states that if anything is to happen to him his brother Jose would be the one to make decisions for him.    Advanced care discussion took 32 minutes     Dispo: Pt s/p Right BKA, will need PT consultation. Anticipated LOS 4-5 days pending improvement and pain control. Will likely need FÉLIX. 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 Pt admitted with severe sepsis secondary to wet gangrene, complicated by maggot infestation of wound. Vascular sx consulted and pt s/p wound cleansing and went to the OR for emergent source control and s/p  Guillotine BKA. Post operatively day #1 remains stable. Empirically on vanco/ zosyn. Hospital course complicated by acute on chronic anemia in the setting of AOCD, s/p 2 u prbc on admission and 1 u prbc intraop with appropriate response to hgb. FOBT pending. Also noted with BRANT/ hyponatremia likely due to dehydration/sepsis, s/p ivf fluid resuscitation with improving renal function and sodium.     Severe sepsis 2/2 wet gangrene of the RLE with maggot infestation  - s/p emergent source control and s/p Guillotine BKA POD #1  - no intra op complications   - currently with down trending leukocytosis to wnl from 18 to 16  - elevated esr/ crp   - afebrile  and normotensive   - f/u B cx  - c/w Zosyn 3.375 g IV q8h and vancomycin now q12h   -vanco trough per pharmacy   -X-ray R foot and ankle. Soft tissue swelling, ulcerations w/ diffuse OM  -Pain control: Tylenol, Toradol 15mg (mod), Dilaudid 1mg (subcut) - severe  (pts last dose of suboxone was yest)  - c/w flexeril po tid prn   - ID consult noted and appreciated, d/w Dr. Santa the plan of   - vascular sx consult noted and appreciated, d/w ELIZABETH Hubbard the plan of care   - Podiatry consult noted and appreciated   - Pain mx consulted, stated to avoid narcotics in IV form   - Psych consulted for Suboxone   - Julio d/c today, c/w Trial of void     BRANT  - likely secondary to dehydration/ sepsis possible ATN   -Improving renal fxn to wnl   - s/p IVF resuscitation   - maintain on IVF for  6 hours post op   - avoid nephrotoxic medications  - will c/w monitoring renal fxn closely     B/l groin intertriginous dermatitis  -C/w Nystatin powder bid  -Continue to monitor for signs of infection     Hyponatremia   -Improving   sodium 134  -Likely 2/2 dehydration due to poor PO intake   -C/w IVF and encourage PO hydration   -Urine lytes wnl   -Continue to monitor     Hyperkalemia   - initially hemolyzed and repeat was wnl   -Resolved     Acute on Chronic Anemia   - noted AOCD   -Initial Hb 8.3, downtrended to 6.4   - likely bc pt was initially dehydrated   -s/p 2U PRBCs on admision and 1 u prbc intra op   - appropriate response to prbc transfusion   - FOBT pening   -Iron panel suggest AOCD   - will monitor hgb and transfuse for hgb <7     Abnormal EKG   -Sinus tachycardia w/ T wave inversion in leads V4-6  -Pt denies CP or SOB  -Trop neg X2  -Continue to monitor   - f/u tte   -cardiology was consulted on admission   -Tele d/c today     IDDM-2 poorly controlled  -HbA1c 7  -initially pt was npo now on a diet  - fs stable  - c/w accuchecks ACHS TID  - c/w HS   - c/w hypoglycemia protocol     HLD  -Will hold Lipitor for now (will re-start on ) to avoid rhabdo     H/o polysubstance use disorder - currently taking Suboxone  -Utox negative  -Last dose of Suboxone was yesterday  -Concerns for withdrawal  -PM and Psych consults pending     Preventative Measures  DVT ppx: Will hold AC for now in setting of anemia.   - c/w scd boots to LLE     Advanced Directive: Full code  Next of kin: Brother Jose      Dispo: Pt s/p Right BKA, will need PT consultation. Anticipated LOS 4-5 days pending improvement and pain control. Will likely need FÉLIX. 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 Pt admitted with severe sepsis secondary to wet gangrene, complicated by maggot infestation of wound. Vascular sx consulted and pt s/p wound cleansing and went to the OR for emergent source control and s/p  Guillotine BKA. Post operatively day #1 remains stable. Empirically on vanco/ zosyn. Hospital course complicated by acute on chronic anemia in the setting of AOCD, s/p 2 u prbc on admission and 1 u prbc intraop with appropriate response to hgb. FOBT pending. Also noted with BRANT/ hyponatremia likely due to dehydration/sepsis, s/p ivf fluid resuscitation with improving renal function and sodium.     Severe sepsis 2/2 wet gangrene of the RLE with maggot infestation  - s/p emergent source control and s/p Guillotine BKA POD #1  - no intra op complications   - currently with down trending leukocytosis to wnl from 18 to 16  - elevated esr/ crp   - afebrile  and normotensive   - f/u B cx  - c/w Zosyn 3.375 g IV q8h and vancomycin now q12h   -vanco trough per pharmacy   -X-ray R foot and ankle. Soft tissue swelling, ulcerations w/ diffuse OM  -Pain control: Tylenol, Toradol 15mg (mod), Dilaudid 1mg (subcut) - severe  (pts last dose of suboxone was yest)  - c/w flexeril po tid prn   - c/w colace for prn constipation   - ID consult noted and appreciated, d/w Dr. Santa the plan of   - vascular sx consult noted and appreciated, d/w ELIZABETH Hubbard the plan of care   - Podiatry consult noted and appreciated   - Pain mx consulted, stated to avoid narcotics in IV form   - Psych consulted for Suboxone   - Kim d/c today, c/w Trial of void     BRANT  - likely secondary to dehydration/ sepsis possible ATN   -Improving renal fxn to wnl   - s/p IVF resuscitation   - maintain on IVF for  6 hours post op   - avoid nephrotoxic medications  - will c/w monitoring renal fxn closely     B/l groin intertriginous dermatitis  -C/w Nystatin powder bid  -Continue to monitor for signs of infection     Hyponatremia   -Improving   sodium 134  -Likely 2/2 dehydration due to poor PO intake   -C/w IVF and encourage PO hydration   -Urine lytes wnl   -Continue to monitor     Hyperkalemia   - initially hemolyzed and repeat was wnl   -Resolved     Acute on Chronic Anemia   - noted AOCD   -Initial Hb 8.3, downtrended to 6.4   - likely bc pt was initially dehydrated   -s/p 2U PRBCs on admision and 1 u prbc intra op   - appropriate response to prbc transfusion   - FOBT pening   -Iron panel suggest AOCD   - will monitor hgb and transfuse for hgb <7   -C/w iron supplement, - c/w vitamin c       Abnormal EKG   -Sinus tachycardia w/ T wave inversion in leads V4-6  -Pt denies CP or SOB  -Trop neg X2  -Continue to monitor   - f/u tte   -cardiology was consulted on admission   -Tele d/c today     IDDM-2 poorly controlled  -HbA1c 7  -initially pt was npo now on a diet  - fs stable  - c/w accuchecks ACHS TID  - c/w HS   - c/w hypoglycemia protocol     HLD  -Will hold Lipitor for now (will re-start on ) to avoid rhabdo     H/o polysubstance use disorder - currently taking Suboxone  -Utox negative  -Last dose of Suboxone was yesterday  -Concerns for withdrawal  -PM and Psych consults pending     Preventative Measures  DVT ppx: Will hold AC for now in setting of anemia.   - c/w scd boots to LLE     Advanced Directive: Full code  Next of kin: Brother Jose      Dispo: Pt s/p Right BKA, will need PT consultation. Anticipated LOS 4-5 days pending improvement and pain control. Will likely need FÉLIX. 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 Pt admitted with severe sepsis secondary to wet gangrene of RLE, complicated by maggot infestation of wound. Vascular sx consulted and pt s/p wound cleansing and went to the OR for emergent source control and s/p  Guillotine R BKA. Post operatively day #1 remains stable. Sepsis resolved post sx. Empirically on vanco/ zosyn, Bcx negative. Hospital course complicated by acute on chronic anemia in the setting of AOCD, s/p 2 u prbc on admission and 1 u prbc intraop with appropriate response to hgb, remains stable thereafter. FOBT pending but no evidence of active bleeding. Also noted with BRANT/ hyponatremia likely due to dehydration/sepsis, s/p ivf fluid resuscitation with improving renal function and sodium. Slow clinical improvement.     Severe sepsis 2/2 wet gangrene of the RLE with maggot infestation  - s/p emergent source control and s/p Guillotine BKA POD #1  - no intra op complications   - currently with down trending leukocytosis to wnl   - elevated esr/ crp   - afebrile  and normotensive - B cx negative   - c/w Zosyn 3.375 g IV q8h and vancomycin now q12h   D# 3    -vanco trough per pharmacy   -Pain control as per pain mx, no role for suboxone until off of acute narcotics   - will c.w Tylenol 975mg TID, ATC x 3 days, c/w  Dilaudid 4mg PO q4hrs PRN moderate pain  - May offer an alternate dose of Dilaudid 6mg PO q4hrs PRN severe pain will see how pt responds   - c/w Dilaudid 1mg subcutaneous q6hrs PRN severe pain pesristing 1 hour after oral opioids  c/w Robaxin 500mg PO q6hrs ATC  - c/w toradol 15 mg IV q8hrs prn   - pt will not receive IV narcotics only sq given prior abuse   - c/w colace for prn constipation   - Kim discontinued today   - c/w local wound care as per vascular sx   - ID f/u noted and appreciated, d/w Dr. Santa the plan of care  - vascular sx f/u  noted and appreciated, d/w ELIZABETH Hubbard the plan of care, pt will need revision next week and possible wound vac at that time. Eventual need for graft in the future. Will be difficult to close.   - Podiatry consult noted and appreciated   - Pain mx consulted, stated to avoid narcotics in IV form   - Psych consult noted and appreciated, will re consult when ready to transition pt to suboxone     BRANT  - likely secondary to dehydration with associated hyponatremia   -Improving renal fxn to wnl  -improving sodium to 134  - c/w IVF for 12 hours then will d/c   - avoid nephrotoxic medications  - will c/w monitoring renal fxn and sodium closely     B/l groin intertriginous dermatitis  -C/w Nystatin powder bid  -Continue to monitor for signs of infection     Acute on Chronic Anemia   - noted AOCD   - h/h remains stable   -Initial Hb 8.3, downtrended to 6.4   - likely bc pt was initially dehydrated   -s/p 2U PRBCs on admision and 1 u prbc intra op   - appropriate response to prbc transfusion   - FOBT pending, no evidence of bleeding   -Iron panel suggest AOCD   - will monitor hgb and transfuse for hgb <7   -C/w iron supplement, - c/w vitamin c     Abnormal EKG   -Sinus tachycardia w/ T wave inversion in leads V4-6  now with controlled HR initially likely due to sepsis   -Pt denies CP or SOB  -Trop neg X2   - no evidence of any issues on tele so discontinued   - f/u tte   -cardiology was consulted on admission but has not consulted, based on tte will see if needed     IDDM-2 poorly controlled  -HbA1c 7  -initially pt was npo now on a diet  - fs stable  - c/w accuchecks ACHS TID  - c/w HSS  - c/w hypoglycemia protocol     HLD  -C/W LIPITOR PO QHS     H/o polysubstance use disorder - currently taking Suboxone  -Utox negative  -Last dose of Suboxone was day prior to hospitalization   -currently pt requiring narcotics for pain control   -Psych consult noted and appreciated, will reconsult when pt is off narcotics or defer to outpt management     Preventative Measures  DVT ppx: Will hold AC for now in setting of anemia  - c/w scd boots to LLE     Advanced Directive: Full code  Next of kin: Brothalma Garcia    Dispo: Pt s/p Right BKA, will need PT consultation ordered per vascular sx. Anticipated LOS unclear given need for revision next week and eventual graft in the future. May be prolonged.  Will likely need FÉLIX. D/w CM and SW likely APS case and pt will need his living conditions to be checked.

## 2019-08-02 NOTE — PROGRESS NOTE ADULT - ASSESSMENT
54M hx HTN, HLD, DM2, ETOH abuse, IVDU (on suboxone), multiple toe/foot amputations presents with RLE foot ulcer and pain x 5 months.  In ED temp of 99.7, heart rate 150 (improved to 126 s/p ivf bolus), bp 86/54 (improved to 97/54), RR- 22 saturating 95% on room air.   Labs initially WBC 20, Hb 8, Na 123, K 7, Cr 2    Overall, severe sepsis secondary to wet gangrene, maggot infestation of wound, s/p ASHLEY, leukocytosis, anemia, uncontrolled diabetes  s/p BKA POD #1. Afebrile, BP improved, leukocytosis and Ashley resolved. D/w vascular-Wound appears clean      - f/u Blood cultures   - c/w Zosyn 3.375 g IV q8h and vancomycin by level  1 g IV q 12h  - Monitor vanco trough prior to 4th dose and monitor renal function  - Vascular followup  - Trend Fever  - Trend Leukocytosis  - Trend H/H  - Contact isolation    D/w Vascular surgery and Dr Delarosa  Will Follow

## 2019-08-02 NOTE — CONSULT NOTE ADULT - SUBJECTIVE AND OBJECTIVE BOX
VERBAL REPORT: "The medications are not enough."  Patient reports sharp post-surgical pain in the operative leg. He also describes neuropathic (electric) sensations. He reports continued use of Suboxone at home, despite [facility] documenting last  date 04/17/19. He gave [facility], as his dispenser. Pharmacy called; staff confirmed information on [facility] - no prescriptions given since.    PAIN SCORE:  7/10 at rest                 SCALE USED: VNRS    Allergies  Allergy Status Unknown    PAST MEDICAL & SURGICAL HISTORY:  Opioid abuse  ETOH abuse  Hyperlipidemia  HTN (hypertension)  DM (diabetes mellitus)  Amputation of toe, left, traumatic: amputation of all the toes on the left foot  by surgery  Amputation of great toe, right, traumatic: right great toe removed by surgery      MEDICATIONS  (STANDING):  acetaminophen   Tablet .. 975 milliGRAM(s) Oral every 8 hours  dextrose 5%. 1000 milliLiter(s) (50 mL/Hr) IV Continuous <Continuous>  dextrose 50% Injectable 12.5 Gram(s) IV Push once  dextrose 50% Injectable 25 Gram(s) IV Push once  dextrose 50% Injectable 25 Gram(s) IV Push once  folic acid 1 milliGRAM(s) Oral daily  gabapentin 100 milliGRAM(s) Oral three times a day  insulin lispro (HumaLOG) corrective regimen sliding scale   SubCutaneous three times a day before meals  multiple electrolytes Injection Type 1 1000 milliLiter(s) (75 mL/Hr) IV Continuous <Continuous>  multivitamin 1 Tablet(s) Oral daily  nystatin Powder 1 Application(s) Topical two times a day  piperacillin/tazobactam IVPB.. 3.375 Gram(s) IV Intermittent every 8 hours  saccharomyces boulardii 250 milliGRAM(s) Oral two times a day  thiamine 100 milliGRAM(s) Oral daily  vancomycin  IVPB 1000 milliGRAM(s) IV Intermittent every 12 hours    MEDICATIONS  (PRN):  cyclobenzaprine 5 milliGRAM(s) Oral three times a day PRN Muscle Spasm  dextrose 40% Gel 15 Gram(s) Oral once PRN Blood Glucose LESS THAN 70 milliGRAM(s)/deciliter  glucagon  Injectable 1 milliGRAM(s) IntraMuscular once PRN Glucose LESS THAN 70 milligrams/deciliter  HYDROmorphone  Injectable 1 milliGRAM(s) SubCutaneous every 3 hours PRN Severe Pain (7 - 10)  ketorolac 15 milliGRAM(s) Oral every 8 hours PRN Moderate Pain (4 - 6)      PHYSICAL EXAM:  GENERAL: NAD, obese  HEAD:  Atraumatic, Normocephalic  EYES: PERRLA,   NERVOUS SYSTEM:  Alert & Oriented X3, Good concentration; Motor Strength 5/5 B/L upper and lower extremities  CHEST/LUNG: Clear speech; no evident SOB at rest  ABDOMEN: Obese; Bowel sounds present - tolerates regular diet      Vital Signs Last 24 Hrs  T(C): 36.7 (02 Aug 2019 07:57), Max: 36.7 (02 Aug 2019 00:50)  T(F): 98 (02 Aug 2019 07:57), Max: 98 (02 Aug 2019 00:50)  HR: 108 (02 Aug 2019 07:57) (101 - 117)  BP: 138/81 (02 Aug 2019 07:57) (99/61 - 138/81)  BP(mean): --  RR: 18 (02 Aug 2019 07:57) (14 - 18)  SpO2: 94% (02 Aug 2019 07:57) (92% - 100%)                          7.9    9.55  )-----------( 245      ( 02 Aug 2019 07:43 )             26.5       LIVER FUNCTIONS - ( 02 Aug 2019 07:43 )  Alb: 1.3 g/dL / Pro: 5.8 g/dL / ALK PHOS: 534 U/L / ALT: 16 U/L / AST: 31 U/L / GGT: x             PT/INR - ( 01 Aug 2019 13:34 )   PT: 14.3 sec;   INR: 1.24 ratio         PTT - ( 01 Aug 2019 13:34 )  PTT:28.8 sec      COMMENTS/PLAN:

## 2019-08-02 NOTE — CONSULT NOTE ADULT - ASSESSMENT
53 y/o M, POD #1 Guillotine amputation below right knee. Hx: opioid and ETOH abuse, previously on Suboxone. Last picked up 04/17/19. The lack of continued used of Suboxone raises suspicion or relapse (illicit drug use). He was found resting, supine in bed, NAD, A&Ox3. Post-operative pain appreciated. Discussed short term use of opioids with return to Suboxone prior to discharge. Patient agrees.

## 2019-08-02 NOTE — CONSULT NOTE ADULT - ASSESSMENT
opioid use disorder  past agonist treatment  due to need for acute post operative pain management re-starting Suboxone not advised  will need to be off narcotics analgesics and connected to outpatient substance use treatment by social work

## 2019-08-03 LAB
GLUCOSE BLDC GLUCOMTR-MCNC: 119 MG/DL — HIGH (ref 70–99)
GLUCOSE BLDC GLUCOMTR-MCNC: 156 MG/DL — HIGH (ref 70–99)
HCT VFR BLD CALC: 28.8 % — LOW (ref 39–50)
HGB BLD-MCNC: 8.3 G/DL — LOW (ref 13–17)
MCHC RBC-ENTMCNC: 25 PG — LOW (ref 27–34)
MCHC RBC-ENTMCNC: 28.8 GM/DL — LOW (ref 32–36)
MCV RBC AUTO: 86.7 FL — SIGNIFICANT CHANGE UP (ref 80–100)
PLATELET # BLD AUTO: 299 K/UL — SIGNIFICANT CHANGE UP (ref 150–400)
RBC # BLD: 3.32 M/UL — LOW (ref 4.2–5.8)
RBC # FLD: 16.8 % — HIGH (ref 10.3–14.5)
VANCOMYCIN TROUGH SERPL-MCNC: 20.1 UG/ML — HIGH (ref 10–20)
VANCOMYCIN TROUGH SERPL-MCNC: 27.2 UG/ML — CRITICAL HIGH (ref 10–20)
WBC # BLD: 9.26 K/UL — SIGNIFICANT CHANGE UP (ref 3.8–10.5)
WBC # FLD AUTO: 9.26 K/UL — SIGNIFICANT CHANGE UP (ref 3.8–10.5)

## 2019-08-03 PROCEDURE — 93306 TTE W/DOPPLER COMPLETE: CPT | Mod: 26

## 2019-08-03 PROCEDURE — 93010 ELECTROCARDIOGRAM REPORT: CPT

## 2019-08-03 PROCEDURE — 99233 SBSQ HOSP IP/OBS HIGH 50: CPT | Mod: GC

## 2019-08-03 RX ORDER — SODIUM CHLORIDE 9 MG/ML
2000 INJECTION INTRAMUSCULAR; INTRAVENOUS; SUBCUTANEOUS
Refills: 0 | Status: DISCONTINUED | OUTPATIENT
Start: 2019-08-03 | End: 2019-08-04

## 2019-08-03 RX ORDER — VANCOMYCIN HCL 1 G
750 VIAL (EA) INTRAVENOUS EVERY 12 HOURS
Refills: 0 | Status: DISCONTINUED | OUTPATIENT
Start: 2019-08-03 | End: 2019-08-06

## 2019-08-03 RX ORDER — SODIUM CHLORIDE 9 MG/ML
250 INJECTION INTRAMUSCULAR; INTRAVENOUS; SUBCUTANEOUS ONCE
Refills: 0 | Status: COMPLETED | OUTPATIENT
Start: 2019-08-03 | End: 2019-08-03

## 2019-08-03 RX ADMIN — HYDROMORPHONE HYDROCHLORIDE 6 MILLIGRAM(S): 2 INJECTION INTRAMUSCULAR; INTRAVENOUS; SUBCUTANEOUS at 19:08

## 2019-08-03 RX ADMIN — HYDROMORPHONE HYDROCHLORIDE 6 MILLIGRAM(S): 2 INJECTION INTRAMUSCULAR; INTRAVENOUS; SUBCUTANEOUS at 10:18

## 2019-08-03 RX ADMIN — PIPERACILLIN AND TAZOBACTAM 25 GRAM(S): 4; .5 INJECTION, POWDER, LYOPHILIZED, FOR SOLUTION INTRAVENOUS at 05:56

## 2019-08-03 RX ADMIN — PIPERACILLIN AND TAZOBACTAM 25 GRAM(S): 4; .5 INJECTION, POWDER, LYOPHILIZED, FOR SOLUTION INTRAVENOUS at 14:55

## 2019-08-03 RX ADMIN — HYDROMORPHONE HYDROCHLORIDE 6 MILLIGRAM(S): 2 INJECTION INTRAMUSCULAR; INTRAVENOUS; SUBCUTANEOUS at 19:58

## 2019-08-03 RX ADMIN — Medication 975 MILLIGRAM(S): at 03:12

## 2019-08-03 RX ADMIN — Medication 250 MILLIGRAM(S): at 05:55

## 2019-08-03 RX ADMIN — METHOCARBAMOL 750 MILLIGRAM(S): 500 TABLET, FILM COATED ORAL at 05:56

## 2019-08-03 RX ADMIN — Medication 975 MILLIGRAM(S): at 02:38

## 2019-08-03 RX ADMIN — SODIUM CHLORIDE 75 MILLILITER(S): 9 INJECTION INTRAMUSCULAR; INTRAVENOUS; SUBCUTANEOUS at 16:07

## 2019-08-03 RX ADMIN — GABAPENTIN 300 MILLIGRAM(S): 400 CAPSULE ORAL at 17:24

## 2019-08-03 RX ADMIN — METHOCARBAMOL 750 MILLIGRAM(S): 500 TABLET, FILM COATED ORAL at 14:58

## 2019-08-03 RX ADMIN — Medication 1 MILLIGRAM(S): at 10:22

## 2019-08-03 RX ADMIN — HYDROMORPHONE HYDROCHLORIDE 4 MILLIGRAM(S): 2 INJECTION INTRAMUSCULAR; INTRAVENOUS; SUBCUTANEOUS at 06:24

## 2019-08-03 RX ADMIN — Medication 975 MILLIGRAM(S): at 10:20

## 2019-08-03 RX ADMIN — PIPERACILLIN AND TAZOBACTAM 25 GRAM(S): 4; .5 INJECTION, POWDER, LYOPHILIZED, FOR SOLUTION INTRAVENOUS at 21:33

## 2019-08-03 RX ADMIN — Medication 975 MILLIGRAM(S): at 11:18

## 2019-08-03 RX ADMIN — Medication 1 TABLET(S): at 10:22

## 2019-08-03 RX ADMIN — HYDROMORPHONE HYDROCHLORIDE 6 MILLIGRAM(S): 2 INJECTION INTRAMUSCULAR; INTRAVENOUS; SUBCUTANEOUS at 11:17

## 2019-08-03 RX ADMIN — METHOCARBAMOL 750 MILLIGRAM(S): 500 TABLET, FILM COATED ORAL at 21:33

## 2019-08-03 RX ADMIN — Medication 500 MILLIGRAM(S): at 10:22

## 2019-08-03 RX ADMIN — Medication 250 MILLIGRAM(S): at 17:24

## 2019-08-03 RX ADMIN — Medication 100 MILLIGRAM(S): at 10:22

## 2019-08-03 RX ADMIN — GABAPENTIN 300 MILLIGRAM(S): 400 CAPSULE ORAL at 05:54

## 2019-08-03 RX ADMIN — SODIUM CHLORIDE 500 MILLILITER(S): 9 INJECTION INTRAMUSCULAR; INTRAVENOUS; SUBCUTANEOUS at 14:58

## 2019-08-03 RX ADMIN — HYDROMORPHONE HYDROCHLORIDE 4 MILLIGRAM(S): 2 INJECTION INTRAMUSCULAR; INTRAVENOUS; SUBCUTANEOUS at 05:54

## 2019-08-03 NOTE — PROGRESS NOTE ADULT - SUBJECTIVE AND OBJECTIVE BOX
HPI/OVERNIGHT EVENTS: Patient seen and examined at bedside this AM. Nursing reports Denies fever, chills, nausea, vomitting, chest pain, SOB, dizziness, abd pain or any other concerning symptoms. reports pain with dressing change    Vital Signs Last 24 Hrs  T(C): 36.4 (03 Aug 2019 07:50), Max: 36.5 (02 Aug 2019 15:33)  T(F): 97.6 (03 Aug 2019 07:50), Max: 97.7 (02 Aug 2019 15:33)  HR: 110 (03 Aug 2019 06:01) (89 - 110)  BP: 111/72 (03 Aug 2019 07:50) (111/72 - 126/65)  BP(mean): --  RR: 17 (03 Aug 2019 07:50) (17 - 18)  SpO2: 93% (03 Aug 2019 07:50) (90% - 93%)    I&O's Detail    02 Aug 2019 07:01  -  03 Aug 2019 07:00  --------------------------------------------------------  IN:    multiple electrolytes Injection Type 1: 450 mL    Solution: 100 mL    Solution: 250 mL  Total IN: 800 mL    OUT:    Indwelling Catheter - Urethral: 2100 mL    Voided: 750 mL  Total OUT: 2850 mL    Total NET: -2050 mL      General: NAD  Pulmonary: Nonlabored breathing, CTA diminshed throughout  Cardiovascular: Normal S1, S2  Abdominal: soft, NT/ND  Extremities: R BKA dressing removed. Wound is pink, clean with appropriate areas of bleeding, bone appears healthy. W-DSD reapplied with ACE for gentle compression, elevated and attempted to maintained straight  LABS:                        7.9    9.55  )-----------( 245      ( 02 Aug 2019 07:43 )             26.5     08-02    134<L>  |  104  |  71.0<H>  ----------------------------<  104  4.5   |  21.0<L>  |  0.93    Ca    8.1<L>      02 Aug 2019 07:43  Phos  3.6     08-02  Mg     1.7     08-02    TPro  5.8<L>  /  Alb  1.3<L>  /  TBili  0.8  /  DBili  x   /  AST  31  /  ALT  16  /  AlkPhos  534<H>  08-02    PT/INR - ( 01 Aug 2019 13:34 )   PT: 14.3 sec;   INR: 1.24 ratio         PTT - ( 01 Aug 2019 13:34 )  PTT:28.8 sec      MEDICATIONS  (STANDING):  ascorbic acid 500 milliGRAM(s) Oral daily  dextrose 5%. 1000 milliLiter(s) (50 mL/Hr) IV Continuous <Continuous>  dextrose 50% Injectable 12.5 Gram(s) IV Push once  dextrose 50% Injectable 25 Gram(s) IV Push once  dextrose 50% Injectable 25 Gram(s) IV Push once  ferrous    sulfate 325 milliGRAM(s) Oral four times a day  folic acid 1 milliGRAM(s) Oral daily  gabapentin 300 milliGRAM(s) Oral two times a day  insulin lispro (HumaLOG) corrective regimen sliding scale   SubCutaneous three times a day before meals  methocarbamol 750 milliGRAM(s) Oral every 8 hours  multiple electrolytes Injection Type 1 1000 milliLiter(s) (75 mL/Hr) IV Continuous <Continuous>  multivitamin 1 Tablet(s) Oral daily  piperacillin/tazobactam IVPB.. 3.375 Gram(s) IV Intermittent every 8 hours  saccharomyces boulardii 250 milliGRAM(s) Oral two times a day  sodium chloride 0.9% Bolus 250 milliLiter(s) IV Bolus once  sodium chloride 0.9%. 2000 milliLiter(s) (75 mL/Hr) IV Continuous <Continuous>  thiamine 100 milliGRAM(s) Oral daily  vancomycin  IVPB 750 milliGRAM(s) IV Intermittent every 12 hours    MEDICATIONS  (PRN):  dextrose 40% Gel 15 Gram(s) Oral once PRN Blood Glucose LESS THAN 70 milliGRAM(s)/deciliter  docusate sodium 100 milliGRAM(s) Oral two times a day PRN Constipation  glucagon  Injectable 1 milliGRAM(s) IntraMuscular once PRN Glucose LESS THAN 70 milligrams/deciliter  HYDROmorphone   Tablet 4 milliGRAM(s) Oral every 3 hours PRN Moderate Pain (4 - 6)  HYDROmorphone   Tablet 6 milliGRAM(s) Oral every 6 hours PRN Severe Pain (7 - 10)  HYDROmorphone  Injectable 1 milliGRAM(s) SubCutaneous every 6 hours PRN Severe Pain persisting 1 hour after oral opioids  ketorolac 15 milliGRAM(s) Oral every 8 hours PRN Moderate Pain (4 - 6)

## 2019-08-03 NOTE — PROGRESS NOTE ADULT - ASSESSMENT
Plan:  Daily dressing changes by vascular surgery  Maintain R BKA elevated and will place immobilizer to help prevent contracture  Pain management. Pt reports he sees Dr Pierce in [place] for his suboxone. Could not provide phone # or address at this time. Consider engaging pain management service for assistance with his pain needs.  Consider toradol and gabapentin  Will RTOR next week for possible revision vs wound vac placement and future skin grafting

## 2019-08-03 NOTE — PROGRESS NOTE ADULT - SUBJECTIVE AND OBJECTIVE BOX
Chief Complaint:    HPI:  54M hx HTN, HLD, DM2, ETOH abuse, IVDU (on suboxone), multiple toe/foot amputations presents with severe foot infection. Patient states that lives on own and hasn't been able to leave house for the last month. He states he's friend with delivery people that bring him his food and meds. He's had multiple foot/toe amputations due to diabetic ulcers. The last one was about two years ago. He's noticed this foot wound brewing for a while, but has not sought medical attention because was concerned that would need amputation and thought could take care of it himself. He states for the last month has not mila able to walk on foot. He states for the past few days he's been having worsening bilateral leg and foot pain and has felt very fatigued. Today his brother came to check on him and upon seeing the foot wound made him call EMS and come to ED.     Pt has history of etoh abuse, but states cut back a lot. He states drinks bottle of champagne a couple times a month and last time being two weeks ago. He also initially denied history of IVDU, however when asked about suboxone subscription he states that he's been on suboxone for last 2-3 years.     Upon arriving to ED, patient was had temp of 99.7, heart rate 150 (improved to 126 s/p ivf bolus), bp 86/54 (improved to 97/54), RR- 22 saturating 95% on room air. Patient was found to have diffuse ulcers of foot and ankle with exposed bones/tendons and infested with maggots. Patient given 2.7 bolus, vanc and zosyn. Podiatry washed out wound. (2019 23:37)      PAST MEDICAL & SURGICAL HISTORY:  Opioid abuse  ETOH abuse  Hyperlipidemia  HTN (hypertension)  DM (diabetes mellitus)  Amputation of toe, left, traumatic: amputation of all the toes on the left foot  by surgery  Amputation of great toe, right, traumatic: right great toe removed by surgery      FAMILY HISTORY:  No pertinent family history in first degree relatives      SOCIAL HISTORY:  Denies Smoking, Alcohol, or Drug Use    Allergies    Allergy Status Unknown    Intolerances        PAIN MEDICATIONS:  gabapentin 300 milliGRAM(s) Oral two times a day  HYDROmorphone   Tablet 4 milliGRAM(s) Oral every 3 hours PRN  HYDROmorphone   Tablet 6 milliGRAM(s) Oral every 6 hours PRN  HYDROmorphone  Injectable 1 milliGRAM(s) SubCutaneous every 6 hours PRN  ketorolac 15 milliGRAM(s) Oral every 8 hours PRN  methocarbamol 750 milliGRAM(s) Oral every 8 hours    Heme:    Antibiotics:  piperacillin/tazobactam IVPB.. 3.375 Gram(s) IV Intermittent every 8 hours  vancomycin  IVPB 750 milliGRAM(s) IV Intermittent every 12 hours    Cardiovascular:    GI:  docusate sodium 100 milliGRAM(s) Oral two times a day PRN    Endocrine:  dextrose 40% Gel 15 Gram(s) Oral once PRN  dextrose 50% Injectable 12.5 Gram(s) IV Push once  dextrose 50% Injectable 25 Gram(s) IV Push once  dextrose 50% Injectable 25 Gram(s) IV Push once  glucagon  Injectable 1 milliGRAM(s) IntraMuscular once PRN  insulin lispro (HumaLOG) corrective regimen sliding scale   SubCutaneous three times a day before meals    All Other Medications:  ascorbic acid 500 milliGRAM(s) Oral daily  dextrose 5%. 1000 milliLiter(s) IV Continuous <Continuous>  ferrous    sulfate 325 milliGRAM(s) Oral four times a day  folic acid 1 milliGRAM(s) Oral daily  multiple electrolytes Injection Type 1 1000 milliLiter(s) IV Continuous <Continuous>  multivitamin 1 Tablet(s) Oral daily  saccharomyces boulardii 250 milliGRAM(s) Oral two times a day  sodium chloride 0.9% Bolus 250 milliLiter(s) IV Bolus once  sodium chloride 0.9%. 2000 milliLiter(s) IV Continuous <Continuous>  thiamine 100 milliGRAM(s) Oral daily      LABS:                          8.3    9.26  )-----------( 299      ( 03 Aug 2019 12:31 )             28.8     08-02    134<L>  |  104  |  71.0<H>  ----------------------------<  104  4.5   |  21.0<L>  |  0.93    Ca    8.1<L>      02 Aug 2019 07:43  Phos  3.6     08-02  Mg     1.7     08-    TPro  5.8<L>  /  Alb  1.3<L>  /  TBili  0.8  /  DBili  x   /  AST  31  /  ALT  16  /  AlkPhos  534<H>  -    PT/INR - ( 01 Aug 2019 13:34 )   PT: 14.3 sec;   INR: 1.24 ratio         PTT - ( 01 Aug 2019 13:34 )  PTT:28.8 sec        Vital Signs Last 24 Hrs  T(C): 36.4 (03 Aug 2019 07:50), Max: 36.5 (02 Aug 2019 15:33)  T(F): 97.6 (03 Aug 2019 07:50), Max: 97.7 (02 Aug 2019 15:33)  HR: 110 (03 Aug 2019 06:01) (89 - 110)  BP: 111/72 (03 Aug 2019 07:50) (111/72 - 126/65)  BP(mean): --  RR: 17 (03 Aug 2019 07:50) (17 - 18)  SpO2: 93% (03 Aug 2019 07:50) (90% - 93%)    PAIN SCORE:    5     SCALE USED: (1-10)    OTHER SYMPTOMS (0 = None;  1 = Mild; 2 = Moderate; 3 = Severe)  Anorexia: 0          Dyspnea:0         Pruritus:0         Nausea:0             Agitation:0        Anxiety:0    Vomitin          Drowsiness:0    Depression:0  Constipation:0    Diarrhea:0        Other:0           PHYSICAL EXAM:    GENERAL: NAD, well-groomed, well-developed  NERVOUS SYSTEM:  Alert & Oriented X3, Good concentration;

## 2019-08-03 NOTE — PROGRESS NOTE ADULT - ASSESSMENT
54M hx HTN, HLD, DM2, ETOH abuse, IVDU (on suboxone), multiple toe/foot amputations presents with RLE foot ulcer and pain x 5 months.  In ED temp of 99.7, heart rate 150 (improved to 126 s/p ivf bolus), bp 86/54 (improved to 97/54), RR- 22 saturating 95% on room air.   Labs initially WBC 20, Hb 8, Na 123, K 7, Cr 2    Overall, severe sepsis secondary to wet gangrene, maggot infestation of wound, s/p ASHLEY, leukocytosis, anemia, uncontrolled diabetes  s/p BKA 8/2/19 . Afebrile, BP improved, leukocytosis and Ashley resolved.      - Blood cultures no growth  -  D/w vascular-Wound appears clean  - c/w Zosyn 3.375 g IV q8h and vancomycin by level    - Monitor vanco trough and monitor renal function  - Vascular followup  - Trend Fever  - Trend Leukocytosis  - Trend H/H  - Contact isolation  - Plan for antibiotics till 4 days post BKA revision       Will Follow

## 2019-08-03 NOTE — PROGRESS NOTE ADULT - SUBJECTIVE AND OBJECTIVE BOX
Patient is a 54y old  Male who presents with a chief complaint of sepsis, foot wound (01 Aug 2019 13:18) s/p BKA controlling pain     Overnight AM events:  Patient seen and examined at beside. No acute events over night. POD #2 s/p Right BKA. Pt is not complaining of pain. Aside from this reports he is doing well just needs to rest. Pt is tolerating PO diet w/o n/v/d. No BM since admission.     ROS: Denies CP, HA, SOB, n/v/c/d, fever/chills, abdominal/back pain, vision changes, numbness/tingling, blood in urine or stool     CAPILLARY BLOOD GLUCOSE  POCT  Blood Glucose 100 mg/dL (08.02.19 @ 21:34)  POCT  Blood Glucose 131 mg/dL (08.01.19 @ 21:16)  POCT Blood Glucose.: 156 mg/dL (01 Aug 2019 06:02)  POCT Blood Glucose.: 196 mg/dL (01 Aug 2019 02:29)  POCT Blood Glucose.: 135 mg/dL (01 Aug 2019 00:00)  POCT Blood Glucose.: 133 mg/dL (31 Jul 2019 18:59)    Physical Examination  Vital Signs Last 24 Hrs  T(C): 36.5 (02 Aug 2019 15:33), Max: 36.7 (02 Aug 2019 07:57)  T(F): 97.7 (02 Aug 2019 15:33), Max: 98 (02 Aug 2019 07:57)  HR: 110 (03 Aug 2019 06:01) (89 - 110)  BP: 116/70 (03 Aug 2019 06:01) (116/70 - 138/81)  RR: 18 (02 Aug 2019 15:33) (18 - 18)  SpO2: 90% (02 Aug 2019 15:33) (90% - 94%)    GENERAL: Obese, WD WN, poor hygiene   HEENT: PERRLA, pupil constricted to light b/l, poor dentition   RESP: CTA b/l,, no crackles, no wheezing or rhonchi   CVS: RRR, Normal S1 & S2, No m/r/g  Abdomen: soft +BS normoactive, NT, ND, b/l groin intertriginous dermatitis - no signs of infection   Extremities: R foot s/p BKA with dressing C/D/I, L foot - s/p partial amputation of foot clean dry no evidence of infection   Neuro: AAOX3, non focal       LAB                   8.2    15.83 )-----------( 260      ( 01 Aug 2019 13:34 )             27.2         08-01    132<L>  |  100  |  92.0<H>  ----------------------------<  125<H>  4.8   |  19.0<L>  |  1.52<H>    Ca    8.2<L>      01 Aug 2019 13:34  Phos  5.0     08-01    TPro  6.2<L>  /  Alb  1.5<L>  /  TBili  0.8  /  DBili  x   /  AST  17  /  ALT  12  /  AlkPhos  314<H>  08-01      CARDIAC MARKERS ( 01 Aug 2019 01:54 )  x     / <0.01 ng/mL / x     / x     / x      CARDIAC MARKERS ( 31 Jul 2019 21:07 )  x     / 0.01 ng/mL / 21 U/L / x     / x            LIVER FUNCTIONS - ( 01 Aug 2019 13:34 )  Alb: 1.5 g/dL / Pro: 6.2 g/dL / ALK PHOS: 314 U/L / ALT: 12 U/L / AST: 17 U/L / GGT: x             PT/INR - ( 01 Aug 2019 13:34 )   PT: 14.3 sec;   INR: 1.24 ratio         PTT - ( 01 Aug 2019 13:34 )  PTT:28.8 sec      IMAGING  < from: Xray Ankle Complete 3 Views, Right (07.31.19 @ 22:25) >  IMPRESSION:   Soft tissue swelling, ulcerations consistent with diffuse   osteomyelitis involving the phalanges and metatarsal bones tarsal bones   possibly ankle joint/show bilateral medial malleolus.    < end of copied text >    < from: Xray Foot AP + Lateral + Oblique, Right (07.31.19 @ 22:25) >  MPRESSION:   Soft tissue swelling, ulcerations consistent with diffuse   osteomyelitis involving the phalanges and metatarsal bones tarsal bones   possibly ankle joint/show bilateral medial malleolus.    < end of copied text >    < from: Xray Chest 1 View-PORTABLE IMMEDIATE (07.31.19 @ 20:04) >  Impression:  No evidence of acute cardiopulmonary disease. Allowing for differences in   technique, no significant change since 5/11/2016..    < end of copied text >

## 2019-08-03 NOTE — PROGRESS NOTE ADULT - ASSESSMENT
55 y/o male seen and examined at bedside while eating  pain is currently 5/10  reports pain is being well controlled with current pain medications   He is currently on High dose pain medications, No increase will be made  He will remain on current medication while in the hospital  however before discharge, the patient should be re-started on subaxone

## 2019-08-03 NOTE — PROGRESS NOTE ADULT - ASSESSMENT
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 Pt admitted with severe sepsis secondary to wet gangrene of RLE, complicated by maggot infestation of wound. Vascular sx consulted and pt s/p wound cleansing and went to the OR for emergent source control and s/p  Guillotine R BKA. Post operatively day #1 remains stable. Sepsis resolved post sx. Empirically on vanco/ zosyn, Bcx negative. Hospital course complicated by acute on chronic anemia in the setting of AOCD, s/p 2 u prbc on admission and 1 u prbc intraop with appropriate response to hgb, remains stable thereafter. FOBT pending but no evidence of active bleeding. Also noted with BRANT/ hyponatremia likely due to dehydration/sepsis, s/p ivf fluid resuscitation with improving renal function and sodium. Slow clinical improvement.     Severe sepsis 2/2 wet gangrene of the RLE with maggot infestation  - s/p emergent source control and s/p Guillotine BKA POD #2  - no intra op complications   - currently with down trending leukocytosis to wnl   - elevated esr/ crp   - afebrile  and normotensive - B cx negative   - c/w Zosyn 3.375 g IV q8h and vancomycin now q12h   D# 3    -vanco trough per pharmacy   -Pain control as per pain mx, no role for suboxone until off of acute narcotics   - will c.w Tylenol 975mg TID, ATC x 3 days, c/w  Dilaudid 4mg PO q4hrs PRN moderate pain  - May offer an alternate dose of Dilaudid 6mg PO q4hrs PRN severe pain will see how pt responds   - c/w Dilaudid 1mg subcutaneous q6hrs PRN severe pain pesristing 1 hour after oral opioids  c/w Robaxin 500mg PO q6hrs ATC  - c/w toradol 15 mg IV q8hrs prn   - pt will not receive IV narcotics only sq given prior abuse   - c/w colace for prn constipation   - Kim discontinued today   - c/w local wound care as per vascular sx   - ID f/u noted and appreciated, d/w Dr. Santa the plan of care  - vascular sx f/u  noted and appreciated, d/w ELIZAEBTH Hubbard the plan of care, pt will need revision next week and possible wound vac at that time. Eventual need for graft in the future. Will be difficult to close.   - Podiatry consult noted and appreciated   - Pain mx consulted, stated to avoid narcotics in IV form   - Psych consult noted and appreciated, will re consult when ready to transition pt to suboxone     BRANT  - likely secondary to dehydration with associated hyponatremia   -Improving renal fxn to wnl  -improving sodium to 134  - c/w IVF for 12 hours then will d/c   - avoid nephrotoxic medications  - will c/w monitoring renal fxn and sodium closely     B/l groin intertriginous dermatitis  -C/w Nystatin powder bid  -Continue to monitor for signs of infection     Acute on Chronic Anemia   - noted AOCD   - h/h remains stable   -Initial Hb 8.3, downtrended to 6.4   - likely bc pt was initially dehydrated   -s/p 2U PRBCs on admision and 1 u prbc intra op   - appropriate response to prbc transfusion   - FOBT pending, no evidence of bleeding   -Iron panel suggest AOCD   - will monitor hgb and transfuse for hgb <7   -C/w iron supplement, - c/w vitamin c     Abnormal EKG   -Sinus tachycardia w/ T wave inversion in leads V4-6  now with controlled HR initially likely due to sepsis   -Pt denies CP or SOB  -Trop neg X2   - no evidence of any issues on tele so discontinued   - f/u tte   -cardiology was consulted on admission but has not consulted, based on tte will see if needed     IDDM-2 poorly controlled  -HbA1c 7  - fs stable  - c/w accuchecks ACHS TID  - c/w HSS  - c/w hypoglycemia protocol     HLD  -C/W LIPITOR PO QHS     H/o polysubstance use disorder - currently taking Suboxone  -Utox negative  -Last dose of Suboxone was day prior to hospitalization   -currently pt requiring narcotics for pain control   -Psych consult noted and appreciated, will reconsult when pt is off narcotics or defer to outpt management     Preventative Measures  DVT ppx: Will hold AC for now in setting of anemia  - c/w scd boots to LLE     Advanced Directive: Full code  Next of kin: Brother Jose    Dispo: Pt s/p Right BKA, will need PT consultation ordered per vascular sx. Anticipated LOS unclear given need for revision next week and eventual graft in the future. May be prolonged.  Will likely need FÉLIX. D/w CM and SW likely APS case and pt will need his living conditions to be checked. 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 Pt admitted with severe sepsis secondary to wet gangrene of RLE, complicated by maggot infestation of wound. Vascular sx consulted and pt s/p wound cleansing and went to the OR for emergent source control and s/p  Guillotine R BKA. Post operatively day #1 remains stable. Sepsis resolved post sx. Empirically on vanco/ zosyn, Bcx negative. Hospital course complicated by acute on chronic anemia in the setting of AOCD, s/p 2 u prbc on admission and 1 u prbc intraop with appropriate response to hgb, remains stable thereafter. FOBT pending but no evidence of active bleeding. Also noted with BRANT/ hyponatremia likely due to dehydration/sepsis, s/p ivf fluid resuscitation with improving renal function and sodium. Slow clinical improvement. ID, Podiatry, Vascular, PM and Psych consults noted.     Severe sepsis 2/2 wet gangrene of the RLE with maggot infestation  - s/p emergent source control and s/p Guillotine BKA POD #2  - no intra op complications   - currently with down trending leukocytosis to wnl   - elevated esr/ crp   - afebrile  and normotensive - B cx negative   - c/w Zosyn 3.375 g IV q8h and vancomycin now q12h   D# 3    -vanco trough per pharmacy   -Pain control as per pain mx, no role for suboxone until off of acute narcotics   - will c.w Tylenol 975mg TID, ATC x 3 days, c/w  Dilaudid 4mg PO q4hrs PRN moderate pain  - May offer an alternate dose of Dilaudid 6mg PO q4hrs PRN severe pain will see how pt responds   - c/w Dilaudid 1mg subcutaneous q6hrs PRN severe pain persisting 1 hour after oral opioids  c/w Robaxin 500mg PO q6hrs ATC  - c/w toradol 15 mg IV q8hrs prn   - pt will not receive IV narcotics only sq given prior abuse   - c/w colace for prn constipation   - Kim discontinued today   - c/w local wound care as per vascular sx   - ID f/u noted and appreciated, d/w Dr. Santa the plan of care. Pt will c/w abx until 4 days s/p BKA revision.   - vascular sx f/u  noted and appreciated. RTOR next week for possible revision vs wound vac placement and future skin grafting. C/w elevation and immobilizer to prevent contracture.   - Podiatry consult noted and appreciated   - Pain mx consult noted, avoid narcotics in IV form   - Psych consult noted and appreciated, will re consult due to concerns for depression and also when ready to transition pt to suboxone     BRANT  - likely secondary to dehydration with associated hyponatremia   -Improving renal fxn to wnl  -improving sodium to 134  - c/w IVF bolus and maintenance   - avoid nephrotoxic medications  - will c/w monitoring renal fxn and sodium closely     B/l groin intertriginous dermatitis  -C/w Nystatin powder bid  -Continue to monitor for signs of infection     Acute on Chronic Anemia   - noted AOCD   - h/h remains stable   -Initial Hb 8.3, downtrended to 6.4   - likely bc pt was initially dehydrated   -s/p 2U PRBCs on admision and 1 u prbc intra op   - appropriate response to prbc transfusion   - FOBT pending, no evidence of bleeding   -Iron panel suggest AOCD   - will monitor hgb and transfuse for hgb <7   -C/w iron supplement, - c/w vitamin c     Abnormal EKG   -Sinus tachycardia noted this am. Pt denies CP, SOB. EKG ordered  -On admission - sinus tachycardia w/ T wave inversion in leads V4-6 now with controlled HR initially likely due to sepsis   -Trop neg X2   - no evidence of any issues on tele so discontinued   - F/u tte   -cardiology was consulted on admission but has not consulted, based on tte will see if needed     IDDM-2 poorly controlled  -HbA1c 7  - fs stable  - c/w accuchecks ACHS TID  - c/w HSS  - c/w hypoglycemia protocol     HLD  -C/W LIPITOR PO QHS     H/o polysubstance use disorder - currently taking Suboxone  -Utox negative  -Last dose of Suboxone was day prior to hospitalization   -currently pt requiring narcotics for pain control   -Psych consult noted and appreciated, will reconsult when pt is off narcotics or defer to outpt management     Preventative Measures  DVT ppx: Will hold AC for now in setting of anemia  - c/w scd boots to LLE     Advanced Directive: Full code  Next of kin: Brother Jose    Dispo: Pt s/p Right BKA, will need PT consultation ordered per vascular sx. Anticipated LOS unclear given need for revision next week and eventual graft in the future. May be prolonged.  Will likely need FÉLIX. D/w CM and SW likely APS case and pt will need his living conditions to be checked. 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 Pt admitted with severe sepsis secondary to wet gangrene of RLE, complicated by maggot infestation of wound. Vascular sx consulted and pt s/p wound cleansing and went to the OR for emergent source control and s/p  Guillotine R BKA. Post operatively day #1 remains stable. Sepsis resolved post sx. Empirically on vanco/ zosyn, Bcx negative. Hospital course complicated by acute on chronic anemia in the setting of AOCD, s/p 2 u prbc on admission and 1 u prbc intraop with appropriate response to hgb, remains stable thereafter. FOBT pending but no evidence of active bleeding. Also noted with BRANT/ hyponatremia likely due to dehydration/sepsis, s/p ivf fluid resuscitation with improving renal function and sodium. Slow clinical improvement. ID, Podiatry, Vascular, PM and Psych consults noted.     Severe sepsis 2/2 wet gangrene of the RLE with maggot infestation  - s/p emergent source control and s/p Guillotine BKA POD #2  - no intra op complications   - currently with down trending leukocytosis to wnl   - elevated esr/ crp   - afebrile  and normotensive - B cx negative   - c/w Zosyn 3.375 g IV q8h and vancomycin now q12h   D# 3    -vanco trough per pharmacy   -Pain control as per pain mx, no role for suboxone until off of acute narcotics   - will c.w Tylenol 975mg TID, ATC x 3 days, c/w  Dilaudid 4mg PO q4hrs PRN moderate pain  - May offer an alternate dose of Dilaudid 6mg PO q4hrs PRN severe pain will see how pt responds   - c/w Dilaudid 1mg subcutaneous q6hrs PRN severe pain persisting 1 hour after oral opioids  c/w Robaxin 500mg PO q6hrs ATC  - c/w toradol 15 mg IV q8hrs prn   - pt will not receive IV narcotics only sq given prior abuse   - c/w colace for prn constipation   - Kim discontinued today   - c/w local wound care as per vascular sx   - ID f/u noted and appreciated, d/w Dr. Santa the plan of care. Pt will c/w abx until 4 days s/p BKA revision.   - vascular sx f/u  noted and appreciated. RTOR next week for possible revision vs wound vac placement and future skin grafting. C/w elevation and immobilizer to prevent contracture.   - Podiatry consult noted and appreciated   - Pain mx consult noted, avoid narcotics in IV form   - Psych consult noted and appreciated, will re consult due to concerns for depression and also when ready to transition pt to suboxone     BRANT  - likely secondary to dehydration with associated hyponatremia   -Improving renal fxn to wnl  -improving sodium to 134  - c/w IVF bolus and maintenance   - avoid nephrotoxic medications  - will c/w monitoring renal fxn and sodium closely     B/l groin intertriginous dermatitis  -C/w Nystatin powder bid  -Continue to monitor for signs of infection     Acute on Chronic Anemia   - noted AOCD   - h/h remains stable   -Initial Hb 8.3, downtrended to 6.4   - likely bc pt was initially dehydrated   -s/p 2U PRBCs on admision and 1 u prbc intra op   - appropriate response to prbc transfusion   - FOBT pending, no evidence of bleeding   -Iron panel suggest AOCD   - will monitor hgb and transfuse for hgb <7   -C/w iron supplement, - c/w vitamin c     Abnormal EKG   -Sinus tachycardia noted this am. Pt denies CP, SOB. EKG done today sinus tachycardia at 126. Unchanged from ekg on admission.   -On admission - sinus tachycardia w/ T wave inversion in leads V4-6 now with controlled HR initially likely due to sepsis   -Trop neg X2   - no evidence of any issues on tele so discontinued   - F/u tte   -cardiology was consulted on admission but has not consulted, based on tte will see if needed     IDDM-2 poorly controlled  -HbA1c 7  - fs stable  - c/w accuchecks ACHS TID  - c/w HSS  - c/w hypoglycemia protocol     HLD  -C/W LIPITOR PO QHS     H/o polysubstance use disorder - currently taking Suboxone  -Utox negative  -Last dose of Suboxone was day prior to hospitalization   -currently pt requiring narcotics for pain control   -Psych consult noted and appreciated, will reconsult when pt is off narcotics or defer to outpt management     Preventative Measures  DVT ppx: Will hold AC for now in setting of anemia  - c/w scd boots to LLE     Advanced Directive: Full code  Next of kin: Brother Jose    Dispo: Pt s/p Right BKA, will need PT consultation ordered per vascular sx. Anticipated LOS unclear given need for revision next week and eventual graft in the future. May be prolonged.  Will likely need FÉLIX. D/w CM and SW likely APS case and pt will need his living conditions to be checked.

## 2019-08-03 NOTE — PROGRESS NOTE ADULT - SUBJECTIVE AND OBJECTIVE BOX
[facility]  INFECTIOUS DISEASES AND INTERNAL MEDICINE at [place]  =======================================================  Rupesh Satna MD  Diplomates American Board of Internal Medicine and Infectious Diseases  Tel: [phone]      Fax: [phone]  =======================================================    BREANNA MENDOZA [id number]    Follow up: maggot infestation of wound  s/p BKA POD 8/2/19    No fever or chills  No diarrhea  No abdominal pain      Allergies:  Allergy Status Unknown      Antibiotics:  piperacillin/tazobactam IVPB.. 3.375 Gram(s) IV Intermittent every 8 hours  vancomycin  IVPB 1000 milliGRAM(s) IV Intermittent every 12 hours       REVIEW OF SYSTEMS:  CONSTITUTIONAL:  No Fever or chills  HEENT:   No diplopia or blurred vision.  No earache, sore throat or runny nose.  CARDIOVASCULAR:  No pressure, squeezing, strangling, tightness, heaviness or aching about the chest, neck, axilla or epigastrium.  RESPIRATORY:  No cough, shortness of breath  GASTROINTESTINAL:  No nausea, vomiting or diarrhea.  GENITOURINARY:  No dysuria, frequency or urgency. No Blood in urine  MUSCULOSKELETAL:  no joint aches, no muscle pain  SKIN:  RT BKA in dressing  NEUROLOGIC:  No Headaches, seizures or weakness.  PSYCHIATRIC:  No disorder of thought or mood.  ENDOCRINE:  No heat or cold intolerance  HEMATOLOGICAL:  No easy bruising or bleeding.       Physical Exam:  Vital Signs Last 24 Hrs  T(C): 36.4 (03 Aug 2019 07:50), Max: 36.5 (02 Aug 2019 15:33)  T(F): 97.6 (03 Aug 2019 07:50), Max: 97.7 (02 Aug 2019 15:33)  HR: 110 (03 Aug 2019 06:01) (89 - 110)  BP: 111/72 (03 Aug 2019 07:50) (111/72 - 126/65)  RR: 17 (03 Aug 2019 07:50) (17 - 18)  SpO2: 93% (03 Aug 2019 07:50) (90% - 93%)      GEN: NAD, pleasant  HEENT: normocephalic and atraumatic. EOMI. PERRL.  Anicteric   NECK: Supple.   LUNGS: Clear to auscultation.  HEART: Regular rate and rhythm  ABDOMEN: Soft, nontender, and nondistended.  Positive bowel sounds.    : No CVA tenderness  EXTREMITIES: Without any edema.  MSK: no joint swelling  NEUROLOGIC: No focal deficits  PSYCHIATRIC: Appropriate affect .  SKIN: RT LE dressing clean dry intact, left TMA stump intact      Labs:  08-02    134<L>  |  104  |  71.0<H>  ----------------------------<  104  4.5   |  21.0<L>  |  0.93    Ca    8.1<L>      02 Aug 2019 07:43  Phos  3.6     08-02  Mg     1.7     08-02    TPro  5.8<L>  /  Alb  1.3<L>  /  TBili  0.8  /  DBili  x   /  AST  31  /  ALT  16  /  AlkPhos  534<H>  08-02                          7.9    9.55  )-----------( 245      ( 02 Aug 2019 07:43 )             26.5       PT/INR - ( 01 Aug 2019 13:34 )   PT: 14.3 sec;   INR: 1.24 ratio         PTT - ( 01 Aug 2019 13:34 )  PTT:28.8 sec    LIVER FUNCTIONS - ( 02 Aug 2019 07:43 )  Alb: 1.3 g/dL / Pro: 5.8 g/dL / ALK PHOS: 534 U/L / ALT: 16 U/L / AST: 31 U/L / GGT: x             Sedimentation Rate, Erythrocyte: 61 mm/hr (08-01-19 @ 16:47)    C-Reactive Protein, Serum: 24.40 mg/dL (08-01-19 @ 16:47)      RECENT CULTURES:  08-01 @ 05:26 .Urine     No growth      07-31 @ 19:34 .Blood     No growth at 48 hours

## 2019-08-04 LAB
ALBUMIN SERPL ELPH-MCNC: 1.6 G/DL — LOW (ref 3.3–5.2)
ALP SERPL-CCNC: 911 U/L — HIGH (ref 40–120)
ALT FLD-CCNC: 21 U/L — SIGNIFICANT CHANGE UP
ANION GAP SERPL CALC-SCNC: 10 MMOL/L — SIGNIFICANT CHANGE UP (ref 5–17)
ANISOCYTOSIS BLD QL: SLIGHT — SIGNIFICANT CHANGE UP
AST SERPL-CCNC: 46 U/L — HIGH
BASOPHILS # BLD AUTO: 0.07 K/UL — SIGNIFICANT CHANGE UP (ref 0–0.2)
BASOPHILS NFR BLD AUTO: 0.9 % — SIGNIFICANT CHANGE UP (ref 0–2)
BILIRUB SERPL-MCNC: 0.6 MG/DL — SIGNIFICANT CHANGE UP (ref 0.4–2)
BUN SERPL-MCNC: 34 MG/DL — HIGH (ref 8–20)
CALCIUM SERPL-MCNC: 7.2 MG/DL — LOW (ref 8.6–10.2)
CHLORIDE SERPL-SCNC: 106 MMOL/L — SIGNIFICANT CHANGE UP (ref 98–107)
CO2 SERPL-SCNC: 22 MMOL/L — SIGNIFICANT CHANGE UP (ref 22–29)
CREAT SERPL-MCNC: 0.75 MG/DL — SIGNIFICANT CHANGE UP (ref 0.5–1.3)
EOSINOPHIL # BLD AUTO: 0.21 K/UL — SIGNIFICANT CHANGE UP (ref 0–0.5)
EOSINOPHIL NFR BLD AUTO: 2.6 % — SIGNIFICANT CHANGE UP (ref 0–6)
GIANT PLATELETS BLD QL SMEAR: PRESENT — SIGNIFICANT CHANGE UP
GLUCOSE BLDC GLUCOMTR-MCNC: 132 MG/DL — HIGH (ref 70–99)
GLUCOSE BLDC GLUCOMTR-MCNC: 158 MG/DL — HIGH (ref 70–99)
GLUCOSE BLDC GLUCOMTR-MCNC: 164 MG/DL — HIGH (ref 70–99)
GLUCOSE SERPL-MCNC: 142 MG/DL — HIGH (ref 70–115)
HCT VFR BLD CALC: 26.5 % — LOW (ref 39–50)
HGB BLD-MCNC: 7.7 G/DL — LOW (ref 13–17)
HYPOCHROMIA BLD QL: SLIGHT — SIGNIFICANT CHANGE UP
LYMPHOCYTES # BLD AUTO: 0.57 K/UL — LOW (ref 1–3.3)
LYMPHOCYTES # BLD AUTO: 7 % — LOW (ref 13–44)
MACROCYTES BLD QL: SLIGHT — SIGNIFICANT CHANGE UP
MAGNESIUM SERPL-MCNC: 1.3 MG/DL — LOW (ref 1.6–2.6)
MANUAL SMEAR VERIFICATION: SIGNIFICANT CHANGE UP
MCHC RBC-ENTMCNC: 25.5 PG — LOW (ref 27–34)
MCHC RBC-ENTMCNC: 29.1 GM/DL — LOW (ref 32–36)
MCV RBC AUTO: 87.7 FL — SIGNIFICANT CHANGE UP (ref 80–100)
MONOCYTES # BLD AUTO: 0.78 K/UL — SIGNIFICANT CHANGE UP (ref 0–0.9)
MONOCYTES NFR BLD AUTO: 9.6 % — SIGNIFICANT CHANGE UP (ref 2–14)
NEUTROPHILS # BLD AUTO: 6.32 K/UL — SIGNIFICANT CHANGE UP (ref 1.8–7.4)
NEUTROPHILS NFR BLD AUTO: 77.2 % — HIGH (ref 43–77)
NEUTS BAND # BLD: 0.9 % — SIGNIFICANT CHANGE UP (ref 0–8)
PHOSPHATE SERPL-MCNC: 2.9 MG/DL — SIGNIFICANT CHANGE UP (ref 2.4–4.7)
PLAT MORPH BLD: NORMAL — SIGNIFICANT CHANGE UP
PLATELET # BLD AUTO: 271 K/UL — SIGNIFICANT CHANGE UP (ref 150–400)
POTASSIUM SERPL-MCNC: 4 MMOL/L — SIGNIFICANT CHANGE UP (ref 3.5–5.3)
POTASSIUM SERPL-SCNC: 4 MMOL/L — SIGNIFICANT CHANGE UP (ref 3.5–5.3)
PROT SERPL-MCNC: 6.1 G/DL — LOW (ref 6.6–8.7)
RBC # BLD: 3.02 M/UL — LOW (ref 4.2–5.8)
RBC # FLD: 16.8 % — HIGH (ref 10.3–14.5)
RBC BLD AUTO: SIGNIFICANT CHANGE UP
SODIUM SERPL-SCNC: 138 MMOL/L — SIGNIFICANT CHANGE UP (ref 135–145)
TROPONIN T SERPL-MCNC: <0.01 NG/ML — SIGNIFICANT CHANGE UP (ref 0–0.06)
VARIANT LYMPHS # BLD: 1.8 % — SIGNIFICANT CHANGE UP (ref 0–6)
WBC # BLD: 8.09 K/UL — SIGNIFICANT CHANGE UP (ref 3.8–10.5)
WBC # FLD AUTO: 8.09 K/UL — SIGNIFICANT CHANGE UP (ref 3.8–10.5)

## 2019-08-04 PROCEDURE — 99232 SBSQ HOSP IP/OBS MODERATE 35: CPT

## 2019-08-04 PROCEDURE — 93010 ELECTROCARDIOGRAM REPORT: CPT

## 2019-08-04 PROCEDURE — 99233 SBSQ HOSP IP/OBS HIGH 50: CPT | Mod: GC

## 2019-08-04 RX ORDER — HYDROMORPHONE HYDROCHLORIDE 2 MG/ML
1 INJECTION INTRAMUSCULAR; INTRAVENOUS; SUBCUTANEOUS ONCE
Refills: 0 | Status: DISCONTINUED | OUTPATIENT
Start: 2019-08-04 | End: 2019-08-04

## 2019-08-04 RX ORDER — PANTOPRAZOLE SODIUM 20 MG/1
40 TABLET, DELAYED RELEASE ORAL
Refills: 0 | Status: DISCONTINUED | OUTPATIENT
Start: 2019-08-04 | End: 2019-08-07

## 2019-08-04 RX ORDER — DOCUSATE SODIUM 100 MG
100 CAPSULE ORAL THREE TIMES A DAY
Refills: 0 | Status: DISCONTINUED | OUTPATIENT
Start: 2019-08-04 | End: 2019-08-05

## 2019-08-04 RX ORDER — MAGNESIUM SULFATE 500 MG/ML
2 VIAL (ML) INJECTION ONCE
Refills: 0 | Status: COMPLETED | OUTPATIENT
Start: 2019-08-04 | End: 2019-08-04

## 2019-08-04 RX ORDER — POLYETHYLENE GLYCOL 3350 17 G/17G
17 POWDER, FOR SOLUTION ORAL ONCE
Refills: 0 | Status: COMPLETED | OUTPATIENT
Start: 2019-08-04 | End: 2019-08-04

## 2019-08-04 RX ORDER — ENOXAPARIN SODIUM 100 MG/ML
40 INJECTION SUBCUTANEOUS DAILY
Refills: 0 | Status: DISCONTINUED | OUTPATIENT
Start: 2019-08-04 | End: 2019-08-07

## 2019-08-04 RX ORDER — SENNA PLUS 8.6 MG/1
2 TABLET ORAL AT BEDTIME
Refills: 0 | Status: DISCONTINUED | OUTPATIENT
Start: 2019-08-04 | End: 2019-08-05

## 2019-08-04 RX ORDER — METOPROLOL TARTRATE 50 MG
25 TABLET ORAL
Refills: 0 | Status: DISCONTINUED | OUTPATIENT
Start: 2019-08-04 | End: 2019-08-07

## 2019-08-04 RX ADMIN — Medication 1 TABLET(S): at 12:59

## 2019-08-04 RX ADMIN — Medication 500 MILLIGRAM(S): at 13:00

## 2019-08-04 RX ADMIN — HYDROMORPHONE HYDROCHLORIDE 1 MILLIGRAM(S): 2 INJECTION INTRAMUSCULAR; INTRAVENOUS; SUBCUTANEOUS at 12:09

## 2019-08-04 RX ADMIN — GABAPENTIN 300 MILLIGRAM(S): 400 CAPSULE ORAL at 17:13

## 2019-08-04 RX ADMIN — HYDROMORPHONE HYDROCHLORIDE 4 MILLIGRAM(S): 2 INJECTION INTRAMUSCULAR; INTRAVENOUS; SUBCUTANEOUS at 06:40

## 2019-08-04 RX ADMIN — SENNA PLUS 2 TABLET(S): 8.6 TABLET ORAL at 22:21

## 2019-08-04 RX ADMIN — HYDROMORPHONE HYDROCHLORIDE 4 MILLIGRAM(S): 2 INJECTION INTRAMUSCULAR; INTRAVENOUS; SUBCUTANEOUS at 19:58

## 2019-08-04 RX ADMIN — Medication 100 MILLIGRAM(S): at 09:26

## 2019-08-04 RX ADMIN — ENOXAPARIN SODIUM 40 MILLIGRAM(S): 100 INJECTION SUBCUTANEOUS at 12:59

## 2019-08-04 RX ADMIN — PIPERACILLIN AND TAZOBACTAM 25 GRAM(S): 4; .5 INJECTION, POWDER, LYOPHILIZED, FOR SOLUTION INTRAVENOUS at 13:01

## 2019-08-04 RX ADMIN — HYDROMORPHONE HYDROCHLORIDE 4 MILLIGRAM(S): 2 INJECTION INTRAMUSCULAR; INTRAVENOUS; SUBCUTANEOUS at 00:00

## 2019-08-04 RX ADMIN — HYDROMORPHONE HYDROCHLORIDE 1 MILLIGRAM(S): 2 INJECTION INTRAMUSCULAR; INTRAVENOUS; SUBCUTANEOUS at 14:28

## 2019-08-04 RX ADMIN — Medication 25 MILLIGRAM(S): at 10:45

## 2019-08-04 RX ADMIN — Medication 250 MILLIGRAM(S): at 05:06

## 2019-08-04 RX ADMIN — METHOCARBAMOL 750 MILLIGRAM(S): 500 TABLET, FILM COATED ORAL at 05:06

## 2019-08-04 RX ADMIN — HYDROMORPHONE HYDROCHLORIDE 1 MILLIGRAM(S): 2 INJECTION INTRAMUSCULAR; INTRAVENOUS; SUBCUTANEOUS at 15:40

## 2019-08-04 RX ADMIN — METHOCARBAMOL 750 MILLIGRAM(S): 500 TABLET, FILM COATED ORAL at 13:01

## 2019-08-04 RX ADMIN — Medication 100 MILLIGRAM(S): at 13:00

## 2019-08-04 RX ADMIN — PIPERACILLIN AND TAZOBACTAM 25 GRAM(S): 4; .5 INJECTION, POWDER, LYOPHILIZED, FOR SOLUTION INTRAVENOUS at 22:21

## 2019-08-04 RX ADMIN — PIPERACILLIN AND TAZOBACTAM 25 GRAM(S): 4; .5 INJECTION, POWDER, LYOPHILIZED, FOR SOLUTION INTRAVENOUS at 05:06

## 2019-08-04 RX ADMIN — Medication 1: at 17:13

## 2019-08-04 RX ADMIN — HYDROMORPHONE HYDROCHLORIDE 4 MILLIGRAM(S): 2 INJECTION INTRAMUSCULAR; INTRAVENOUS; SUBCUTANEOUS at 09:27

## 2019-08-04 RX ADMIN — Medication 250 MILLIGRAM(S): at 17:13

## 2019-08-04 RX ADMIN — Medication 50 GRAM(S): at 10:46

## 2019-08-04 RX ADMIN — HYDROMORPHONE HYDROCHLORIDE 4 MILLIGRAM(S): 2 INJECTION INTRAMUSCULAR; INTRAVENOUS; SUBCUTANEOUS at 00:29

## 2019-08-04 RX ADMIN — Medication 1 MILLIGRAM(S): at 13:13

## 2019-08-04 RX ADMIN — Medication 1: at 13:00

## 2019-08-04 RX ADMIN — Medication 25 MILLIGRAM(S): at 22:21

## 2019-08-04 RX ADMIN — HYDROMORPHONE HYDROCHLORIDE 4 MILLIGRAM(S): 2 INJECTION INTRAMUSCULAR; INTRAVENOUS; SUBCUTANEOUS at 10:47

## 2019-08-04 RX ADMIN — HYDROMORPHONE HYDROCHLORIDE 1 MILLIGRAM(S): 2 INJECTION INTRAMUSCULAR; INTRAVENOUS; SUBCUTANEOUS at 10:45

## 2019-08-04 RX ADMIN — Medication 250 MILLIGRAM(S): at 22:22

## 2019-08-04 RX ADMIN — HYDROMORPHONE HYDROCHLORIDE 4 MILLIGRAM(S): 2 INJECTION INTRAMUSCULAR; INTRAVENOUS; SUBCUTANEOUS at 06:10

## 2019-08-04 RX ADMIN — GABAPENTIN 300 MILLIGRAM(S): 400 CAPSULE ORAL at 05:06

## 2019-08-04 RX ADMIN — Medication 250 MILLIGRAM(S): at 08:23

## 2019-08-04 RX ADMIN — HYDROMORPHONE HYDROCHLORIDE 4 MILLIGRAM(S): 2 INJECTION INTRAMUSCULAR; INTRAVENOUS; SUBCUTANEOUS at 19:25

## 2019-08-04 RX ADMIN — METHOCARBAMOL 750 MILLIGRAM(S): 500 TABLET, FILM COATED ORAL at 22:21

## 2019-08-04 RX ADMIN — POLYETHYLENE GLYCOL 3350 17 GRAM(S): 17 POWDER, FOR SOLUTION ORAL at 09:26

## 2019-08-04 NOTE — PROGRESS NOTE ADULT - SUBJECTIVE AND OBJECTIVE BOX
[facility]  INFECTIOUS DISEASES AND INTERNAL MEDICINE at [place]  =======================================================  Rupesh Santa MD  Diplomates American Board of Internal Medicine and Infectious Diseases  Tel: [phone]      Fax: [phone]  =======================================================    BREANNA MENDOZA [id number]    Follow up: maggot infestation of wound  s/p BKA POD 8/2/19    No fever or chills  No diarrhea  No abdominal pain      Allergies:  Allergy Status Unknown      Antibiotics:  piperacillin/tazobactam IVPB.. 3.375 Gram(s) IV Intermittent every 8 hours  vancomycin  IVPB 750 milliGRAM(s) IV Intermittent every 12 hours       REVIEW OF SYSTEMS:  CONSTITUTIONAL:  No Fever or chills  HEENT:   No diplopia or blurred vision.  No earache, sore throat or runny nose.  CARDIOVASCULAR:  No pressure, squeezing, strangling, tightness, heaviness or aching about the chest, neck, axilla or epigastrium.  RESPIRATORY:  No cough, shortness of breath  GASTROINTESTINAL:  No nausea, vomiting or diarrhea.  GENITOURINARY:  No dysuria, frequency or urgency. No Blood in urine  MUSCULOSKELETAL:  no joint aches, no muscle pain  SKIN:  RT BKA in dressing  NEUROLOGIC:  No Headaches, seizures or weakness.  PSYCHIATRIC:  No disorder of thought or mood.  ENDOCRINE:  No heat or cold intolerance  HEMATOLOGICAL:  No easy bruising or bleeding.       Physical Exam:  Vital Signs Last 24 Hrs  T(C): 36.7 (03 Aug 2019 15:29), Max: 36.7 (03 Aug 2019 15:29)  T(F): 98 (03 Aug 2019 15:29), Max: 98 (03 Aug 2019 15:29)  HR: 119 (04 Aug 2019 05:13) (119 - 144)  BP: 149/91 (04 Aug 2019 05:13) (131/84 - 155/79)  RR: 18 (03 Aug 2019 23:51) (18 - 18)  SpO2: 93% (03 Aug 2019 15:29) (93% - 93%)      GEN: NAD, pleasant  HEENT: normocephalic and atraumatic. EOMI. PERRL.  Anicteric   NECK: Supple.   LUNGS: Clear to auscultation.  HEART: Regular rate and rhythm  ABDOMEN: Soft, nontender, and nondistended.  Positive bowel sounds.    : No CVA tenderness  EXTREMITIES: Without any edema.  MSK: no joint swelling  NEUROLOGIC: No focal deficits  PSYCHIATRIC: Appropriate affect .  SKIN: RT LE dressing clean dry intact, left TMA stump intact      Labs:  08-04    138  |  106  |  34.0<H>  ----------------------------<  142<H>  4.0   |  22.0  |  0.75    Ca    7.2<L>      04 Aug 2019 08:30  Phos  2.9     08-04  Mg     1.3     08-04    TPro  6.1<L>  /  Alb  1.6<L>  /  TBili  0.6  /  DBili  x   /  AST  46<H>  /  ALT  21  /  AlkPhos  911<H>  08-04                          7.7    8.09  )-----------( 271      ( 04 Aug 2019 08:30 )             26.5       LIVER FUNCTIONS - ( 04 Aug 2019 08:30 )  Alb: 1.6 g/dL / Pro: 6.1 g/dL / ALK PHOS: 911 U/L / ALT: 21 U/L / AST: 46 U/L / GGT: x             RECENT CULTURES:  08-01 @ 05:26 .Urine     No growth      07-31 @ 19:34 .Blood     No growth at 48 hours

## 2019-08-04 NOTE — PROGRESS NOTE ADULT - SUBJECTIVE AND OBJECTIVE BOX
Patient is a 54y old  Male who presents with a chief complaint of sepsis, foot wound (01 Aug 2019 13:18) s/p BKA controlling pain     Overnight AM events:  Patient seen and examined at beside. No acute events over night. POD #3 s/p Right BKA. Pt c/o phantom limb, states pain is well controlled.  Pt is tolerating PO diet w/o n/v/d. Small BM yday.   Nurse reports patient has labile emotions, from crying to happy. pt denies any s/s of depression.    ROS: Denies CP, HA, SOB, n/v/c/d, fever/chills, abdominal/back pain, vision changes, numbness/tingling, blood in urine or stool     CAPILLARY BLOOD GLUCOSE  POCT Blood Glucose.: 132 mg/dL (08.04.19 @ 08:20)  POCT Blood Glucose.: 156 mg/dL (08.03.19 @ 21:30)  POCT Blood Glucose.: 119 mg/dL (08.03.19 @ 16:59)    Physical Examination  Vital Signs Last 24 Hrs  T(C): 36.7 (03 Aug 2019 15:29), Max: 36.7 (03 Aug 2019 15:29)  T(F): 98 (03 Aug 2019 15:29), Max: 98 (03 Aug 2019 15:29)  HR: 119 (04 Aug 2019 05:13) (119 - 144)  BP: 149/91 (04 Aug 2019 05:13) (131/84 - 155/79)  RR: 18 (03 Aug 2019 23:51) (18 - 18)  SpO2: 93% (03 Aug 2019 15:29) (93% - 93%)    GENERAL: Obese, WD WN, poor hygiene   HEENT: PERRLA, pupil constricted to light b/l, poor dentition   RESP: CTA b/l,, no crackles, no wheezing or rhonchi   CVS: RRR, Normal S1 & S2, No m/r/g  Abdomen: soft +BS normoactive, NT, ND, b/l groin intertriginous dermatitis - no signs of infection   Extremities: Rt foot s/p BKA with dressing C/D/I, L foot - s/p partial amputation of foot clean dry no evidence of infection   Neuro: AAOX3, non focal       LAB: pending AM labs          CARDIAC MARKERS ( 01 Aug 2019 01:54 )  x     / <0.01 ng/mL / x     / x     / x      CARDIAC MARKERS ( 31 Jul 2019 21:07 )  x     / 0.01 ng/mL / 21 U/L / x     / x            LIVER FUNCTIONS - ( 01 Aug 2019 13:34 )  Alb: 1.5 g/dL / Pro: 6.2 g/dL / ALK PHOS: 314 U/L / ALT: 12 U/L / AST: 17 U/L / GGT: x             PT/INR - ( 01 Aug 2019 13:34 )   PT: 14.3 sec;   INR: 1.24 ratio         PTT - ( 01 Aug 2019 13:34 )  PTT:28.8 sec      IMAGING  Echo report pending    < from: Xray Ankle Complete 3 Views, Right (07.31.19 @ 22:25) >  IMPRESSION:   Soft tissue swelling, ulcerations consistent with diffuse   osteomyelitis involving the phalanges and metatarsal bones tarsal bones   possibly ankle joint/show bilateral medial malleolus.    < end of copied text >    < from: Xray Foot AP + Lateral + Oblique, Right (07.31.19 @ 22:25) >  MPRESSION:   Soft tissue swelling, ulcerations consistent with diffuse   osteomyelitis involving the phalanges and metatarsal bones tarsal bones   possibly ankle joint/show bilateral medial malleolus.    < end of copied text >    < from: Xray Chest 1 View-PORTABLE IMMEDIATE (07.31.19 @ 20:04) >  Impression:  No evidence of acute cardiopulmonary disease. Allowing for differences in   technique, no significant change since 5/11/2016..    < end of copied text >    MEDICATIONS  (STANDING):  ascorbic acid 500 milliGRAM(s) Oral daily  dextrose 5%. 1000 milliLiter(s) (50 mL/Hr) IV Continuous <Continuous>  dextrose 50% Injectable 12.5 Gram(s) IV Push once  dextrose 50% Injectable 25 Gram(s) IV Push once  dextrose 50% Injectable 25 Gram(s) IV Push once  ferrous    sulfate 325 milliGRAM(s) Oral four times a day  folic acid 1 milliGRAM(s) Oral daily  gabapentin 300 milliGRAM(s) Oral two times a day  insulin lispro (HumaLOG) corrective regimen sliding scale   SubCutaneous three times a day before meals  methocarbamol 750 milliGRAM(s) Oral every 8 hours  multivitamin 1 Tablet(s) Oral daily  piperacillin/tazobactam IVPB.. 3.375 Gram(s) IV Intermittent every 8 hours  saccharomyces boulardii 250 milliGRAM(s) Oral two times a day  sodium chloride 0.9%. 2000 milliLiter(s) (75 mL/Hr) IV Continuous <Continuous>  thiamine 100 milliGRAM(s) Oral daily  vancomycin  IVPB 750 milliGRAM(s) IV Intermittent every 12 hours    MEDICATIONS  (PRN):  dextrose 40% Gel 15 Gram(s) Oral once PRN Blood Glucose LESS THAN 70 milliGRAM(s)/deciliter  docusate sodium 100 milliGRAM(s) Oral two times a day PRN Constipation  glucagon  Injectable 1 milliGRAM(s) IntraMuscular once PRN Glucose LESS THAN 70 milligrams/deciliter  HYDROmorphone   Tablet 4 milliGRAM(s) Oral every 3 hours PRN Moderate Pain (4 - 6)  HYDROmorphone   Tablet 6 milliGRAM(s) Oral every 6 hours PRN Severe Pain (7 - 10)  HYDROmorphone  Injectable 1 milliGRAM(s) SubCutaneous every 6 hours PRN Severe Pain persisting 1 hour after oral opioids  ketorolac 15 milliGRAM(s) Oral every 8 hours PRN Moderate Pain (4 - 6) Patient is a 54y old  Male who presents with a chief complaint of sepsis, foot wound (01 Aug 2019 13:18) s/p BKA controlling pain     Overnight AM events:  Patient seen and examined at beside. No acute events over night. POD #3 s/p Right BKA. Pt c/o phantom limb, states pain is well controlled.  Pt is tolerating PO diet w/o n/v/d. Small BM yday.   Nurse reports patient has labile emotions, from crying to happy. pt denies any s/s of depression.    ROS: Denies CP, HA, SOB, n/v/c/d, fever/chills, abdominal/back pain, vision changes, numbness/tingling, blood in urine or stool     CAPILLARY BLOOD GLUCOSE  POCT Blood Glucose.: 132 mg/dL (08.04.19 @ 08:20)  POCT Blood Glucose.: 156 mg/dL (08.03.19 @ 21:30)  POCT Blood Glucose.: 119 mg/dL (08.03.19 @ 16:59)    Physical Examination  Vital Signs Last 24 Hrs  T(C): 36.7 (03 Aug 2019 15:29), Max: 36.7 (03 Aug 2019 15:29)  T(F): 98 (03 Aug 2019 15:29), Max: 98 (03 Aug 2019 15:29)  HR: 119 (04 Aug 2019 05:13) (119 - 144)  BP: 149/91 (04 Aug 2019 05:13) (131/84 - 155/79)  RR: 18 (03 Aug 2019 23:51) (18 - 18)  SpO2: 93% (03 Aug 2019 15:29) (93% - 93%)    GENERAL: Obese, WD WN, poor hygiene   HEENT: PERRLA, pupil constricted to light b/l, poor dentition   RESP: CTA b/l,, no crackles, no wheezing or rhonchi   CVS: RRR, Normal S1 & S2, No m/r/g  Abdomen: soft +BS normoactive, NT, ND, b/l groin intertriginous dermatitis - no signs of infection   Extremities: Rt foot s/p BKA with dressing C/D/I, L foot - s/p partial amputation of foot clean dry no evidence of infection   Neuro: AAOX3, non focal       LAB                        7.7    8.09  )-----------( 271      ( 04 Aug 2019 08:30 )             26.5     08-04    138  |  106  |  34.0<H>  ----------------------------<  142<H>  4.0   |  22.0  |  0.75    Ca    7.2<L>      04 Aug 2019 08:30  Phos  2.9     08-04  Mg     1.3     08-04    TPro  6.1<L>  /  Alb  1.6<L>  /  TBili  0.6  /  DBili  x   /  AST  46<H>  /  ALT  21  /  AlkPhos  911<H>  08-04     CARDIAC MARKERS ( 04 Aug 2019 11:37 )  x     / <0.01 ng/mL / x     / x     / x        CARDIAC MARKERS ( 01 Aug 2019 01:54 )  x     / <0.01 ng/mL / x     / x     / x      CARDIAC MARKERS ( 31 Jul 2019 21:07 )  x     / 0.01 ng/mL / 21 U/L / x     / x              IMAGING  < from: TTE Echo Complete w/Doppler (08.03.19 @ 13:18) >  Summary:   1. Technically limited study.   2. Normal left ventricular internal cavity size.   3. Normal global left ventricular systolic function.   4. Left ventricular ejection fraction, by visual estimation, is 65 to   70%.   5. The left ventricular diastolic function could not be assessed in this   study.   6. Normal right ventricular size and function.   7. Moderately enlarged left atrium.   8. Normal trileaflet aortic valve with normal opening.   9. Estimated pulmonary artery systolic pressure is 45.4 mmHg assuming a   right atrial pressure of 5 mmHg, which is consistent with mild pulmonary   hypertension.  10. There is no evidence of pericardial effusion.    < end of copied text >      < from: Xray Ankle Complete 3 Views, Right (07.31.19 @ 22:25) >  IMPRESSION:   Soft tissue swelling, ulcerations consistent with diffuse   osteomyelitis involving the phalanges and metatarsal bones tarsal bones   possibly ankle joint/show bilateral medial malleolus.    < end of copied text >    < from: Xray Foot AP + Lateral + Oblique, Right (07.31.19 @ 22:25) >  MPRESSION:   Soft tissue swelling, ulcerations consistent with diffuse   osteomyelitis involving the phalanges and metatarsal bones tarsal bones   possibly ankle joint/show bilateral medial malleolus.    < end of copied text >    < from: Xray Chest 1 View-PORTABLE IMMEDIATE (07.31.19 @ 20:04) >  Impression:  No evidence of acute cardiopulmonary disease. Allowing for differences in   technique, no significant change since 5/11/2016..    < end of copied text >    MEDICATIONS  (STANDING):  ascorbic acid 500 milliGRAM(s) Oral daily  dextrose 5%. 1000 milliLiter(s) (50 mL/Hr) IV Continuous <Continuous>  dextrose 50% Injectable 12.5 Gram(s) IV Push once  dextrose 50% Injectable 25 Gram(s) IV Push once  dextrose 50% Injectable 25 Gram(s) IV Push once  ferrous    sulfate 325 milliGRAM(s) Oral four times a day  folic acid 1 milliGRAM(s) Oral daily  gabapentin 300 milliGRAM(s) Oral two times a day  insulin lispro (HumaLOG) corrective regimen sliding scale   SubCutaneous three times a day before meals  methocarbamol 750 milliGRAM(s) Oral every 8 hours  multivitamin 1 Tablet(s) Oral daily  piperacillin/tazobactam IVPB.. 3.375 Gram(s) IV Intermittent every 8 hours  saccharomyces boulardii 250 milliGRAM(s) Oral two times a day  sodium chloride 0.9%. 2000 milliLiter(s) (75 mL/Hr) IV Continuous <Continuous>  thiamine 100 milliGRAM(s) Oral daily  vancomycin  IVPB 750 milliGRAM(s) IV Intermittent every 12 hours    MEDICATIONS  (PRN):  dextrose 40% Gel 15 Gram(s) Oral once PRN Blood Glucose LESS THAN 70 milliGRAM(s)/deciliter  docusate sodium 100 milliGRAM(s) Oral two times a day PRN Constipation  glucagon  Injectable 1 milliGRAM(s) IntraMuscular once PRN Glucose LESS THAN 70 milligrams/deciliter  HYDROmorphone   Tablet 4 milliGRAM(s) Oral every 3 hours PRN Moderate Pain (4 - 6)  HYDROmorphone   Tablet 6 milliGRAM(s) Oral every 6 hours PRN Severe Pain (7 - 10)  HYDROmorphone  Injectable 1 milliGRAM(s) SubCutaneous every 6 hours PRN Severe Pain persisting 1 hour after oral opioids  ketorolac 15 milliGRAM(s) Oral every 8 hours PRN Moderate Pain (4 - 6)

## 2019-08-04 NOTE — PROGRESS NOTE ADULT - ASSESSMENT
54M hx HTN, HLD, DM2, ETOH abuse, IVDU (on suboxone), multiple toe/foot amputations presents with RLE foot ulcer and pain x 5 months.  In ED temp of 99.7, heart rate 150 (improved to 126 s/p ivf bolus), bp 86/54 (improved to 97/54), RR- 22 saturating 95% on room air.   Labs initially WBC 20, Hb 8, Na 123, K 7, Cr 2    Overall, severe sepsis secondary to wet gangrene, maggot infestation of wound, s/p ASHLEY, leukocytosis, anemia, uncontrolled diabetes  s/p BKA 8/2/19 . Afebrile, BP improved, leukocytosis and Ashley resolved.      - Blood cultures no growth  - D/w vascular-Wound appears clean  - c/w Zosyn 3.375 g IV q8h and vancomycin by level    - Monitor vanco trough and monitor renal function  - Vascular followup  - Trend Fever  - Trend Leukocytosis  - Trend H/H  - Contact isolation  - Plan for antibiotics till 4 days post BKA revision, which is to be scheduled       Will Follow

## 2019-08-04 NOTE — PROGRESS NOTE ADULT - SUBJECTIVE AND OBJECTIVE BOX
HPI/OVERNIGHT EVENTS: Patient seen and examined at bedside. Dressing to be changed today and daily.  Denies fever, chills, nausea, vomitting, chest pain, SOB, dizziness,     Vital Signs Last 24 Hrs  T(C): 36.7 (03 Aug 2019 15:29), Max: 36.7 (03 Aug 2019 15:29)  T(F): 98 (03 Aug 2019 15:29), Max: 98 (03 Aug 2019 15:29)  HR: 144 (03 Aug 2019 23:51) (110 - 144)  BP: 131/84 (03 Aug 2019 23:51) (111/72 - 155/79)  BP(mean): --  RR: 18 (03 Aug 2019 23:51) (17 - 18)  SpO2: 93% (03 Aug 2019 15:29) (93% - 93%)    I&O's Detail    02 Aug 2019 07:01  -  03 Aug 2019 07:00  --------------------------------------------------------  IN:    multiple electrolytes Injection Type 1: 450 mL    Solution: 100 mL    Solution: 250 mL  Total IN: 800 mL    OUT:    Indwelling Catheter - Urethral: 2100 mL    Voided: 750 mL  Total OUT: 2850 mL    Total NET: -2050 mL      03 Aug 2019 07:01  -  04 Aug 2019 01:09  --------------------------------------------------------  IN:    Sodium Chloride 0.9% IV Bolus: 250 mL    sodium chloride 0.9%.: 300 mL  Total IN: 550 mL    OUT:  Total OUT: 0 mL    Total NET: 550 mL      General: NAD  Pulmonary: Nonlabored breathing, CTA diminshed throughout  Cardiovascular: Normal S1, S2  Abdominal: soft, NT/ND  Extremities: R BKA dressing removed. Wound is pink, clean with appropriate areas of bleeding, bone appears healthy. W-DSD reapplied with ACE for gentle compression, elevated and attempted to maintained straight    LABS:                        8.3    9.26  )-----------( 299      ( 03 Aug 2019 12:31 )             28.8     08-02    134<L>  |  104  |  71.0<H>  ----------------------------<  104  4.5   |  21.0<L>  |  0.93    Ca    8.1<L>      02 Aug 2019 07:43  Phos  3.6     08-02  Mg     1.7     08-02    TPro  5.8<L>  /  Alb  1.3<L>  /  TBili  0.8  /  DBili  x   /  AST  31  /  ALT  16  /  AlkPhos  534<H>  08-02          MEDICATIONS  (STANDING):  ascorbic acid 500 milliGRAM(s) Oral daily  dextrose 5%. 1000 milliLiter(s) (50 mL/Hr) IV Continuous <Continuous>  dextrose 50% Injectable 12.5 Gram(s) IV Push once  dextrose 50% Injectable 25 Gram(s) IV Push once  dextrose 50% Injectable 25 Gram(s) IV Push once  ferrous    sulfate 325 milliGRAM(s) Oral four times a day  folic acid 1 milliGRAM(s) Oral daily  gabapentin 300 milliGRAM(s) Oral two times a day  insulin lispro (HumaLOG) corrective regimen sliding scale   SubCutaneous three times a day before meals  methocarbamol 750 milliGRAM(s) Oral every 8 hours  multiple electrolytes Injection Type 1 1000 milliLiter(s) (75 mL/Hr) IV Continuous <Continuous>  multivitamin 1 Tablet(s) Oral daily  piperacillin/tazobactam IVPB.. 3.375 Gram(s) IV Intermittent every 8 hours  saccharomyces boulardii 250 milliGRAM(s) Oral two times a day  sodium chloride 0.9%. 2000 milliLiter(s) (75 mL/Hr) IV Continuous <Continuous>  thiamine 100 milliGRAM(s) Oral daily  vancomycin  IVPB 750 milliGRAM(s) IV Intermittent every 12 hours    MEDICATIONS  (PRN):  dextrose 40% Gel 15 Gram(s) Oral once PRN Blood Glucose LESS THAN 70 milliGRAM(s)/deciliter  docusate sodium 100 milliGRAM(s) Oral two times a day PRN Constipation  glucagon  Injectable 1 milliGRAM(s) IntraMuscular once PRN Glucose LESS THAN 70 milligrams/deciliter  HYDROmorphone   Tablet 4 milliGRAM(s) Oral every 3 hours PRN Moderate Pain (4 - 6)  HYDROmorphone   Tablet 6 milliGRAM(s) Oral every 6 hours PRN Severe Pain (7 - 10)  HYDROmorphone  Injectable 1 milliGRAM(s) SubCutaneous every 6 hours PRN Severe Pain persisting 1 hour after oral opioids  ketorolac 15 milliGRAM(s) Oral every 8 hours PRN Moderate Pain (4 - 6)

## 2019-08-04 NOTE — PROGRESS NOTE ADULT - SUBJECTIVE AND OBJECTIVE BOX
Patient is a 54y old  Male who presents with a chief complaint of right foot gangrene and sepsis     HPI: Pt seen bedside with BKA on right. States he is feeling much better than when he first came in. States that left foot dressing was changed by the nurse this AM.  Denies f/c/n/v/sob.       PMH: Hyperlipidemia  HTN (hypertension)  DM (diabetes mellitus)    Allergies: Allergy Status Unknown    Medications: acetaminophen   Tablet .. 975 milliGRAM(s) Oral once  Dakins Solution - 1/2 Strength 1 Application(s) Topical Once  dextrose 50% Injectable 50 milliLiter(s) IV Push Once  insulin regular  human recombinant. 6 Unit(s) IV Push once  vancomycin  IVPB 1000 milliGRAM(s) IV Intermittent once    FH:  PSX: Amputation of toe, left, traumatic  Amputation of great toe, right, traumatic    SH: acetaminophen   Tablet .. 975 milliGRAM(s) Oral once  Dakins Solution - 1/2 Strength 1 Application(s) Topical Once  dextrose 50% Injectable 50 milliLiter(s) IV Push Once  insulin regular  human recombinant. 6 Unit(s) IV Push once  vancomycin  IVPB 1000 milliGRAM(s) IV Intermittent once      Vital Signs Last 24 Hrs  T(C): 37.2 (04 Aug 2019 12:07), Max: 37.2 (04 Aug 2019 12:07)  T(F): 98.9 (04 Aug 2019 12:07), Max: 98.9 (04 Aug 2019 12:07)  HR: 131 (04 Aug 2019 12:07) (119 - 144)  BP: 135/89 (04 Aug 2019 12:07) (131/84 - 155/79)  BP(mean): --  RR: 18 (04 Aug 2019 12:07) (18 - 18)  SpO2: 93% (04 Aug 2019 12:07) (93% - 93%)    LABS                                   7.7    8.09  )-----------( 271      ( 04 Aug 2019 08:30 )             26.5     WBC Count: 8.09 K/uL (08-04 @ 08:30)  WBC Count: 9.26 K/uL (08-03 @ 12:31)  WBC Count: 9.55 K/uL (08-02 @ 07:43)  WBC Count: 16.25 K/uL (08-01 @ 16:47)  WBC Count: 15.83 K/uL (08-01 @ 13:34)    08-04    138  |  106  |  34.0<H>  ----------------------------<  142<H>  4.0   |  22.0  |  0.75    Ca    7.2<L>      04 Aug 2019 08:30  Phos  2.9     08-04  Mg     1.3     08-04    TPro  6.1<L>  /  Alb  1.6<L>  /  TBili  0.6  /  DBili  x   /  AST  46<H>  /  ALT  21  /  AlkPhos  911<H>  08-04      ROS  REVIEW OF SYSTEMS:    CONSTITUTIONAL: No fever, weight loss, or fatigue  EYES: No eye pain, visual disturbances, or discharge  ENMT:  No difficulty hearing, tinnitus, vertigo; No sinus or throat pain  NECK: No pain or stiffness  BREASTS: No pain, masses, or nipple discharge  RESPIRATORY: No cough, wheezing, chills or hemoptysis; No shortness of breath  CARDIOVASCULAR: No chest pain, palpitations, dizziness, or leg swelling  GASTROINTESTINAL: No abdominal or epigastric pain. No nausea, vomiting, or hematemesis; No diarrhea or constipation. No melena or hematochezia.  GENITOURINARY: No dysuria, frequency, hematuria, or incontinence  NEUROLOGICAL: No headaches, memory loss, loss of strength, numbness, or tremors  SKIN: No itching, burning, rashes, or lesions   LYMPH NODES: No enlarged glands  ENDOCRINE: No heat or cold intolerance; No hair loss  MUSCULOSKELETAL: No joint pain or swelling; No muscle, back, or extremity pain  PSYCHIATRIC: No depression, anxiety, mood swings, or difficulty sleeping  HEME/LYMPH: No easy bruising, or bleeding gums  ALLERY AND IMMUNOLOGIC: No hives or eczema      PHYSICAL EXAM  GEN: BREANNA MENDOZA is a pleasant well-nourished, well developed 54y Male in no acute distress, alert awake, and oriented to person, place and time.   LE Focused:    Vasc:  DP/PT nonpalpable b/l, unable to determine CFT, edema and gangrenous changes to right foot  Derm:. Left foot plantar ulcer ~7cm x 5cm with fibrogranular base and hyperkeratotic borders. no clinical sign of infection    Neuro: Protective sensation to digits   MSK: TMA left foot, BKA right     < from: Xray Foot AP + Lateral + Oblique, Right (07.31.19 @ 22:25) >     EXAM:  FOOT-RIGHT                         EXAM:  ANKLE-RIGHT                          PROCEDURE DATE:  07/31/2019          INTERPRETATION:  Radiographs of the RIGHT ankle       Radiographs of the RIGHT foot  CLINICAL INFORMATION: Soft tissue ulcerations. Assess for osteomyelitis.    TECHNIQUE:   Frontal, oblique and lateral views of the ankle were   obtained.  AP lateral oblique views of the RIGHT foot  FINDINGS:   No prior examinations are available for review.    Diffuse soft tissue swellingnoted with multiple soft tissue ulcerations.   There is rocker-bottom deformity with a destructive bone lesions of the   lateral medial malleolus, but tarsal bones and metatarsal bones with   pathologic fracture of the first metatarsal bone. Findingsconsistent   with a osteomyelitis. Distal phalanx of third digit absent.   IMPRESSION:   Soft tissue swelling, ulcerations consistent with diffuse   osteomyelitis involving the phalanges and metatarsal bones tarsal bones   possibly ankle joint/show bilateral medial malleolus.                  PREET LONG M.D., ATTENDING RADIOLOGIST  This document has been electronically signed. Aug  1 2019  7:13AM                  < end of copied text >    Left foot xray pending     A:  Left foot ulcer     P:  Patient evaluates, chart reviewed  X-rays left foot pending  Dressed L foot with betadine DSD  Discussed treatment and plan with patient   Podiatry will follow while in house  Discussed with attending Dr. Hassan

## 2019-08-04 NOTE — PROGRESS NOTE ADULT - ASSESSMENT
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 Pt admitted with severe sepsis secondary to wet gangrene of RLE, complicated by maggot infestation of wound. Vascular sx consulted and pt s/p wound cleansing and went to the OR for emergent source control and s/p  Guillotine R BKA. Post operatively day #1 remains stable. Sepsis resolved post sx. Empirically on vanco/ zosyn, Bcx negative. Hospital course complicated by acute on chronic anemia in the setting of AOCD, s/p 2 u prbc on admission and 1 u prbc intraop with appropriate response to hgb, remains stable thereafter. FOBT pending but no evidence of active bleeding. Also noted with BRANT/ hyponatremia likely due to dehydration/sepsis, s/p ivf fluid resuscitation with improving renal function and sodium. Slow clinical improvement. ID, Podiatry, Vascular, PM and Psych consults noted.     Severe sepsis 2/2 wet gangrene of the RLE with maggot infestation  - s/p emergent source control and s/p Guillotine BKA POD #3  - no intra op complications   - currently with down trending leukocytosis to wnl   - elevated esr/ crp   - afebrile  and normotensive - B cx negative   - c/w Zosyn 3.375 g IV q8h and vancomycin now q12h   D# 4    -vanco trough per pharmacy   - pain control as per pain mx, no role for suboxone until off of acute narcotics   - will c/w Tylenol 975mg TID, ATC x 2 days, c/w  Dilaudid 4mg PO q4hrs PRN moderate pain  - c/w Dilaudid 6mg PO q4hrs PRN severe pain   - c/w Dilaudid 1mg subcutaneous q6hrs PRN severe pain persisting 1 hour after oral opioids  - c/w Robaxin 500mg PO q6hrs ATC  - c/w toradol 15 mg IV q8hrs prn   - pt will not receive IV narcotics only sq given prior abuse   - c/w colace for prn constipation   - s/p tran post op   - c/w local wound care as per vascular sx   - ID f/u noted and appreciated, d/w Dr. Santa the plan of care. Pt will c/w abx until 4 days s/p BKA revision.   - vascular sx f/u  noted and appreciated. RTOR next week for possible revision vs wound vac placement and future skin grafting. C/w elevation and immobilizer to prevent contracture.   - Podiatry consult noted and appreciated   - Pain mx consult noted, avoid narcotics in IV form   - Psych consult noted and appreciated, will re consult due to concerns for depression and also when ready to transition pt to suboxone     BRANT  - likely secondary to dehydration with associated hyponatremia   -Improving renal fxn to wnl  -improving sodium to 134  - toleraing PO intake, D/c IVF  - avoid nephrotoxic medications  - will c/w monitoring renal fxn and sodium closely     B/l groin intertriginous dermatitis  -C/w Nystatin powder bid  -Continue to monitor for signs of infection     Acute on Chronic Anemia   - noted AOCD   - h/h remains stable   -Initial Hb 8.3, downtrended to 6.4   - likely bc pt was initially dehydrated   -s/p 2U PRBCs on admision and 1 u prbc intra op   - appropriate response to prbc transfusion   - FOBT pending, no evidence of bleeding   -Iron panel suggest AOCD   - will monitor hgb and transfuse for hgb <7   -C/w iron supplement, - c/w vitamin c     Abnormal EKG   -Sinus tachycardia noted. Pt denies CP, SOB. EKG done today sinus tachycardia at 126. Unchanged from ekg on admission.   -On admission - sinus tachycardia w/ T wave inversion in leads V4-6 now with controlled HR initially likely due to sepsis   -Trop neg X2   - no evidence of any issues on tele so discontinued   - F/u tte - pending report  -cardiology was consulted on admission but has not consulted, based on tte will see if needed     IDDM-2 poorly controlled  -HbA1c 7  - fs stable  - c/w accuchecks ACHS TID  - c/w HSS  - c/w hypoglycemia protocol     HLD  -C/W LIPITOR PO QHS     H/o polysubstance use disorder - currently taking Suboxone  -Utox negative  -Last dose of Suboxone was day prior to hospitalization   -currently pt requiring narcotics for pain control   -Psych consult noted and appreciated, will reconsult when pt is off narcotics or defer to outpt management     Preventative Measures  DVT ppx: started lovenox 40 - benefits outweigh risk. s/p BKA, immobile, sedentary, platelets stable, renal functions wnl.    Advanced Directive: Full code  Next of kin: Brother Jose    Dispo: Pt s/p Right BKA, will need PT consultation ordered per vascular sx. Anticipated LOS unclear given need for revision next week and eventual graft in the future. May be prolonged.  Will likely need FÉLIX. D/w CM and SW likely APS case and pt will need his living conditions to be checked. 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 Pt admitted with severe sepsis secondary to wet gangrene of RLE, complicated by maggot infestation of wound. Vascular sx consulted and pt s/p wound cleansing and went to the OR for emergent source control and s/p  Guillotine R BKA. Post operatively day #1 remains stable. Sepsis resolved post sx. Empirically on vanco/ zosyn, Bcx negative. Hospital course complicated by acute on chronic anemia in the setting of AOCD, s/p 2 u prbc on admission and 1 u prbc intraop with appropriate response to hgb, remains stable thereafter. FOBT pending but no evidence of active bleeding. Also noted with BRANT/ hyponatremia likely due to dehydration/sepsis, s/p ivf fluid resuscitation with improving renal function and sodium. Slow clinical improvement. ID, Podiatry, Vascular, PM and Psych consults noted.     Severe sepsis 2/2 wet gangrene of the RLE with maggot infestation  - s/p emergent source control and s/p Guillotine BKA POD #3  - no intra op complications   - currently with down trending leukocytosis to wnl   - elevated esr/ crp   - afebrile  and normotensive - B cx negative   - c/w Zosyn 3.375 g IV q8h and vancomycin now q12h   D# 4    -vanco trough per pharmacy   - pain control as per pain mx, no role for suboxone until off of acute narcotics   - will c/w Tylenol 975mg TID, ATC x 2 days, c/w  Dilaudid 4mg PO q4hrs PRN moderate pain  - c/w Dilaudid 6mg PO q4hrs PRN severe pain   - c/w Dilaudid 1mg subcutaneous q6hrs PRN severe pain persisting 1 hour after oral opioids  - c/w Robaxin 500mg PO q6hrs ATC  - c/w toradol 15 mg IV q8hrs prn   - pt will not receive IV narcotics only sq given prior abuse   - c/w colace for prn constipation   - s/p tran post op   - c/w local wound care as per vascular sx   - ID f/u noted and appreciated, d/w Dr. Santa the plan of care. Pt will c/w abx until 4 days s/p BKA revision.   - vascular sx f/u  noted and appreciated. RTOR next week for possible revision vs wound vac placement and future skin grafting. C/w elevation and immobilizer to prevent contracture.   - Podiatry consult noted and appreciated   - Pain mx consult noted, avoid narcotics in IV form   - Psych consult noted and appreciated, will re consult due to concerns for depression and also when ready to transition pt to suboxone     Sinus tachycardia  - Pt denies CP, SOB. Repeat EKG done today sinus tachycardia. Unchanged from ekg on admission and 8/3  - On admission - sinus tachycardia w/ T wave inversion in leads V4-6 now with controlled HR initially likely due to sepsis   - admission Trop neg X2   - no evidence of any issues on tele so discontinued   - repeat Troponin 8/4 x 1 - negative  - TTE: Normal LVSF, EF 65-70; No right heart strain; Mild Pul HTN; mod enlarged LA  - f/u CTA chest w/ IV contrast - pt high risk for PE  - cardiology was consulted on admission but has not consulted    BRANT - resolved  - likely secondary to dehydration with associated hyponatremia   -Improving renal fxn to wnl  -improving sodium to 134  - toleraing PO intake, D/c IVF  - monitor renal function post IV contrast   - will c/w monitoring renal fxn and sodium closely     B/l groin intertriginous dermatitis  -C/w Nystatin powder bid  -Continue to monitor for signs of infection     Acute on Chronic Anemia   - noted AOCD   - h/h remains stable   -Initial Hb 8.3, downtrended to 6.4   - likely bc pt was initially dehydrated   -s/p 2U PRBCs on admision and 1 u prbc intra op   - appropriate response to prbc transfusion   - FOBT pending, no evidence of bleeding   -Iron panel suggest AOCD   - will monitor hgb and transfuse for hgb <7   -C/w iron supplement, - c/w vitamin c     IDDM-2 poorly controlled  -HbA1c 7  - fs stable  - c/w accuchecks ACHS TID  - c/w HSS  - c/w hypoglycemia protocol     HLD  -C/W LIPITOR PO QHS     H/o polysubstance use disorder - currently taking Suboxone  -Utox negative  -Last dose of Suboxone was day prior to hospitalization   -currently pt requiring narcotics for pain control   - labile emotions - psych reconsult for signs of depression  -Psych consult noted and appreciated, will reconsult when pt is off narcotics or defer to outpt management     Preventative Measures  DVT ppx: started lovenox 40 - benefits outweigh risk. s/p BKA, immobile, sedentary, platelets stable, renal functions wnl.    Advanced Directive: Full code  Next of kin: Brother Jose    Dispo: Pt s/p Right BKA, will need PT consultation ordered per vascular sx. Anticipated LOS unclear given need for revision next week and eventual graft in the future. May be prolonged.  Will likely need FÉLIX. D/w CM and SW likely APS case and pt will need his living conditions to be checked. 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 Pt admitted with severe sepsis secondary to wet gangrene of RLE, complicated by maggot infestation of wound. Vascular sx consulted and pt s/p wound cleansing and went to the OR for emergent source control and s/p  Guillotine R BKA. Post operatively day #1 remains stable. Sepsis resolved post sx. Empirically on vanco/ zosyn, Bcx negative. Hospital course complicated by acute on chronic anemia in the setting of AOCD, s/p 2 u prbc on admission and 1 u prbc intraop with appropriate response to hgb, remains stable thereafter. FOBT pending but no evidence of active bleeding. Also noted with BRANT/ hyponatremia likely due to dehydration/sepsis, s/p ivf fluid resuscitation with improving renal function and sodium. Slow clinical improvement. ID, Podiatry, Vascular, PM and Psych consults noted.     Severe sepsis 2/2 wet gangrene of the RLE with maggot infestation  - s/p emergent source control and s/p Guillotine BKA POD #3  - no intra op complications   - currently with down trending leukocytosis to wnl   - elevated esr/ crp   - afebrile  and normotensive - B cx negative   - c/w Zosyn 3.375 g IV q8h and vancomycin now q12h   D# 4    -vanco trough per pharmacy   - pain control as per pain mx, no role for suboxone until off of acute narcotics   - will c/w Tylenol 975mg TID, ATC x 2 days, c/w  Dilaudid 4mg PO q4hrs PRN moderate pain  - c/w Dilaudid 6mg PO q4hrs PRN severe pain   - c/w Dilaudid 1mg subcutaneous q6hrs PRN severe pain persisting 1 hour after oral opioids  - c/w Robaxin 500mg PO q6hrs ATC  - c/w toradol 15 mg IV q8hrs prn   - pt will not receive IV narcotics only sq given prior abuse   - c/w colace for prn constipation   - s/p tran post op   - c/w local wound care as per vascular sx   - ID f/u noted and appreciated, d/w Dr. Santa the plan of care. Pt will c/w abx until 4 days s/p BKA revision.   - vascular sx f/u  noted and appreciated. RTOR next week for possible revision vs wound vac placement and future skin grafting. C/w elevation and immobilizer to prevent contracture.   - Podiatry consult noted and appreciated   - Pain mx consult noted, avoid narcotics in IV form   - Psych consult noted and appreciated, will re consult due to concerns for depression and also when ready to transition pt to suboxone     Sinus tachycardia  - Pt denies CP, SOB. Repeat EKG done today sinus tachycardia. Unchanged from ekg on admission and 8/3  - On admission - sinus tachycardia w/ T wave inversion in leads V4-6 now with controlled HR initially likely due to sepsis   - admission Trop neg X2   - no evidence of any issues on tele so discontinued   - repeat Troponin 8/4 x 1 - negative  - TTE: Normal LVSF, EF 65-70; No right heart strain; Mild Pul HTN; mod enlarged LA  -started on low dose bb   - f/u CTA chest w/ IV contrast - pt high risk for PE  - cardiology was consulted on admission but has not consulted    BRANT - resolved  - likely secondary to dehydration with associated hyponatremia   -Improving renal fxn to wnl  -improving sodium to 134  - toleraing PO intake, D/c IVF  - monitor renal function post IV contrast   - will c/w monitoring renal fxn and sodium closely     B/l groin intertriginous dermatitis  -C/w Nystatin powder bid  -Continue to monitor for signs of infection     Acute on Chronic Anemia   - noted AOCD   - h/h remains stable   -Initial Hb 8.3, downtrended to 6.4   - likely bc pt was initially dehydrated   -s/p 2U PRBCs on admision and 1 u prbc intra op   - appropriate response to prbc transfusion   - FOBT pending, no evidence of bleeding   -Iron panel suggest AOCD   - will monitor hgb and transfuse for hgb <7   -C/w iron supplement, - c/w vitamin c     IDDM-2 poorly controlled  -HbA1c 7  - fs stable  - c/w accuchecks ACHS TID  - c/w HSS  - c/w hypoglycemia protocol     HLD  -C/W LIPITOR PO QHS     H/o polysubstance use disorder - currently taking Suboxone  -Utox negative  -Last dose of Suboxone was day prior to hospitalization   -currently pt requiring narcotics for pain control   - labile emotions - psych reconsult for signs of depression  -Psych consult noted and appreciated, will reconsult when pt is off narcotics or defer to outpt management     Preventative Measures  DVT ppx: started lovenox 40 - benefits outweigh risk. s/p BKA, immobile, sedentary, platelets stable, renal functions wnl.    Advanced Directive: Full code  Next of kin: Brother Jose    Dispo: Pt s/p Right BKA, plan for revision of bka , patient continues to be tachy, CTA ordered  to r/o pe

## 2019-08-04 NOTE — PROGRESS NOTE ADULT - ASSESSMENT
54y old  Male who presents with a chief complaint of sepsis, foot wound S/P emergent R Guillotine BKA    - Daily dressing changes by vascular surgery with xeroform gauze and abd. wrapped with kerlex and ace.   - Maintain R BKA elevated and will place immobilizer to help prevent contracture  - Per Pain management suboxone should be restarted on discharge. Pt reports he sees Dr Pierce in [place] for his suboxone.   - Consider toradol and gabapentin  - Per ID recs: will continue IV Abx until POD4  Will RTOR next week for possible revision vs wound vac placement and future skin grafting

## 2019-08-05 LAB
ALBUMIN SERPL ELPH-MCNC: 1.9 G/DL — LOW (ref 3.3–5.2)
ALP SERPL-CCNC: 774 U/L — HIGH (ref 40–120)
ALT FLD-CCNC: 18 U/L — SIGNIFICANT CHANGE UP
ANION GAP SERPL CALC-SCNC: 11 MMOL/L — SIGNIFICANT CHANGE UP (ref 5–17)
AST SERPL-CCNC: 27 U/L — SIGNIFICANT CHANGE UP
BASOPHILS # BLD AUTO: 0.06 K/UL — SIGNIFICANT CHANGE UP (ref 0–0.2)
BASOPHILS NFR BLD AUTO: 0.8 % — SIGNIFICANT CHANGE UP (ref 0–2)
BILIRUB SERPL-MCNC: 0.4 MG/DL — SIGNIFICANT CHANGE UP (ref 0.4–2)
BUN SERPL-MCNC: 23 MG/DL — HIGH (ref 8–20)
CALCIUM SERPL-MCNC: 7.3 MG/DL — LOW (ref 8.6–10.2)
CHLORIDE SERPL-SCNC: 106 MMOL/L — SIGNIFICANT CHANGE UP (ref 98–107)
CO2 SERPL-SCNC: 23 MMOL/L — SIGNIFICANT CHANGE UP (ref 22–29)
CREAT SERPL-MCNC: 0.66 MG/DL — SIGNIFICANT CHANGE UP (ref 0.5–1.3)
CULTURE RESULTS: SIGNIFICANT CHANGE UP
CULTURE RESULTS: SIGNIFICANT CHANGE UP
EOSINOPHIL # BLD AUTO: 0.23 K/UL — SIGNIFICANT CHANGE UP (ref 0–0.5)
EOSINOPHIL NFR BLD AUTO: 3 % — SIGNIFICANT CHANGE UP (ref 0–6)
GLUCOSE BLDC GLUCOMTR-MCNC: 178 MG/DL — HIGH (ref 70–99)
GLUCOSE BLDC GLUCOMTR-MCNC: 268 MG/DL — HIGH (ref 70–99)
GLUCOSE BLDC GLUCOMTR-MCNC: 271 MG/DL — HIGH (ref 70–99)
GLUCOSE SERPL-MCNC: 196 MG/DL — HIGH (ref 70–115)
HCT VFR BLD CALC: 26.6 % — LOW (ref 39–50)
HGB BLD-MCNC: 7.6 G/DL — LOW (ref 13–17)
IMM GRANULOCYTES NFR BLD AUTO: 2.8 % — HIGH (ref 0–1.5)
LYMPHOCYTES # BLD AUTO: 1.5 K/UL — SIGNIFICANT CHANGE UP (ref 1–3.3)
LYMPHOCYTES # BLD AUTO: 19.4 % — SIGNIFICANT CHANGE UP (ref 13–44)
MAGNESIUM SERPL-MCNC: 1.3 MG/DL — LOW (ref 1.8–2.6)
MCHC RBC-ENTMCNC: 25.2 PG — LOW (ref 27–34)
MCHC RBC-ENTMCNC: 28.6 GM/DL — LOW (ref 32–36)
MCV RBC AUTO: 88.1 FL — SIGNIFICANT CHANGE UP (ref 80–100)
MONOCYTES # BLD AUTO: 0.7 K/UL — SIGNIFICANT CHANGE UP (ref 0–0.9)
MONOCYTES NFR BLD AUTO: 9.1 % — SIGNIFICANT CHANGE UP (ref 2–14)
MRSA PCR RESULT.: SIGNIFICANT CHANGE UP
NEUTROPHILS # BLD AUTO: 5.01 K/UL — SIGNIFICANT CHANGE UP (ref 1.8–7.4)
NEUTROPHILS NFR BLD AUTO: 64.9 % — SIGNIFICANT CHANGE UP (ref 43–77)
PHOSPHATE SERPL-MCNC: 2.8 MG/DL — SIGNIFICANT CHANGE UP (ref 2.4–4.7)
PLATELET # BLD AUTO: 299 K/UL — SIGNIFICANT CHANGE UP (ref 150–400)
POTASSIUM SERPL-MCNC: 3.5 MMOL/L — SIGNIFICANT CHANGE UP (ref 3.5–5.3)
POTASSIUM SERPL-SCNC: 3.5 MMOL/L — SIGNIFICANT CHANGE UP (ref 3.5–5.3)
PROT SERPL-MCNC: 6.2 G/DL — LOW (ref 6.6–8.7)
RBC # BLD: 3.02 M/UL — LOW (ref 4.2–5.8)
RBC # FLD: 16.7 % — HIGH (ref 10.3–14.5)
S AUREUS DNA NOSE QL NAA+PROBE: SIGNIFICANT CHANGE UP
SODIUM SERPL-SCNC: 140 MMOL/L — SIGNIFICANT CHANGE UP (ref 135–145)
SPECIMEN SOURCE: SIGNIFICANT CHANGE UP
SPECIMEN SOURCE: SIGNIFICANT CHANGE UP
WBC # BLD: 7.72 K/UL — SIGNIFICANT CHANGE UP (ref 3.8–10.5)
WBC # FLD AUTO: 7.72 K/UL — SIGNIFICANT CHANGE UP (ref 3.8–10.5)

## 2019-08-05 PROCEDURE — 99232 SBSQ HOSP IP/OBS MODERATE 35: CPT

## 2019-08-05 PROCEDURE — 99233 SBSQ HOSP IP/OBS HIGH 50: CPT | Mod: GC

## 2019-08-05 RX ORDER — HYDROMORPHONE HYDROCHLORIDE 2 MG/ML
1 INJECTION INTRAMUSCULAR; INTRAVENOUS; SUBCUTANEOUS ONCE
Refills: 0 | Status: DISCONTINUED | OUTPATIENT
Start: 2019-08-05 | End: 2019-08-05

## 2019-08-05 RX ORDER — MAGNESIUM SULFATE 500 MG/ML
1 VIAL (ML) INJECTION ONCE
Refills: 0 | Status: COMPLETED | OUTPATIENT
Start: 2019-08-05 | End: 2019-08-05

## 2019-08-05 RX ADMIN — Medication 25 MILLIGRAM(S): at 18:25

## 2019-08-05 RX ADMIN — Medication 250 MILLIGRAM(S): at 18:25

## 2019-08-05 RX ADMIN — HYDROMORPHONE HYDROCHLORIDE 6 MILLIGRAM(S): 2 INJECTION INTRAMUSCULAR; INTRAVENOUS; SUBCUTANEOUS at 03:48

## 2019-08-05 RX ADMIN — GABAPENTIN 300 MILLIGRAM(S): 400 CAPSULE ORAL at 18:25

## 2019-08-05 RX ADMIN — Medication 1 MILLIGRAM(S): at 12:41

## 2019-08-05 RX ADMIN — Medication 1 APPLICATION(S): at 12:43

## 2019-08-05 RX ADMIN — Medication 250 MILLIGRAM(S): at 08:18

## 2019-08-05 RX ADMIN — Medication 1: at 08:19

## 2019-08-05 RX ADMIN — Medication 3: at 18:25

## 2019-08-05 RX ADMIN — HYDROMORPHONE HYDROCHLORIDE 6 MILLIGRAM(S): 2 INJECTION INTRAMUSCULAR; INTRAVENOUS; SUBCUTANEOUS at 03:17

## 2019-08-05 RX ADMIN — METHOCARBAMOL 750 MILLIGRAM(S): 500 TABLET, FILM COATED ORAL at 05:20

## 2019-08-05 RX ADMIN — HYDROMORPHONE HYDROCHLORIDE 1 MILLIGRAM(S): 2 INJECTION INTRAMUSCULAR; INTRAVENOUS; SUBCUTANEOUS at 08:18

## 2019-08-05 RX ADMIN — ENOXAPARIN SODIUM 40 MILLIGRAM(S): 100 INJECTION SUBCUTANEOUS at 12:41

## 2019-08-05 RX ADMIN — Medication 2: at 12:41

## 2019-08-05 RX ADMIN — HYDROMORPHONE HYDROCHLORIDE 6 MILLIGRAM(S): 2 INJECTION INTRAMUSCULAR; INTRAVENOUS; SUBCUTANEOUS at 14:28

## 2019-08-05 RX ADMIN — Medication 250 MILLIGRAM(S): at 05:20

## 2019-08-05 RX ADMIN — PIPERACILLIN AND TAZOBACTAM 25 GRAM(S): 4; .5 INJECTION, POWDER, LYOPHILIZED, FOR SOLUTION INTRAVENOUS at 22:33

## 2019-08-05 RX ADMIN — GABAPENTIN 300 MILLIGRAM(S): 400 CAPSULE ORAL at 05:20

## 2019-08-05 RX ADMIN — Medication 500 MILLIGRAM(S): at 12:41

## 2019-08-05 RX ADMIN — HYDROMORPHONE HYDROCHLORIDE 6 MILLIGRAM(S): 2 INJECTION INTRAMUSCULAR; INTRAVENOUS; SUBCUTANEOUS at 23:59

## 2019-08-05 RX ADMIN — PANTOPRAZOLE SODIUM 40 MILLIGRAM(S): 20 TABLET, DELAYED RELEASE ORAL at 05:21

## 2019-08-05 RX ADMIN — Medication 1 TABLET(S): at 12:41

## 2019-08-05 RX ADMIN — Medication 25 MILLIGRAM(S): at 05:20

## 2019-08-05 RX ADMIN — Medication 250 MILLIGRAM(S): at 22:33

## 2019-08-05 RX ADMIN — PIPERACILLIN AND TAZOBACTAM 25 GRAM(S): 4; .5 INJECTION, POWDER, LYOPHILIZED, FOR SOLUTION INTRAVENOUS at 05:20

## 2019-08-05 RX ADMIN — PIPERACILLIN AND TAZOBACTAM 25 GRAM(S): 4; .5 INJECTION, POWDER, LYOPHILIZED, FOR SOLUTION INTRAVENOUS at 14:58

## 2019-08-05 RX ADMIN — HYDROMORPHONE HYDROCHLORIDE 1 MILLIGRAM(S): 2 INJECTION INTRAMUSCULAR; INTRAVENOUS; SUBCUTANEOUS at 08:48

## 2019-08-05 RX ADMIN — HYDROMORPHONE HYDROCHLORIDE 6 MILLIGRAM(S): 2 INJECTION INTRAMUSCULAR; INTRAVENOUS; SUBCUTANEOUS at 23:21

## 2019-08-05 RX ADMIN — HYDROMORPHONE HYDROCHLORIDE 6 MILLIGRAM(S): 2 INJECTION INTRAMUSCULAR; INTRAVENOUS; SUBCUTANEOUS at 13:58

## 2019-08-05 RX ADMIN — Medication 100 MILLIGRAM(S): at 12:41

## 2019-08-05 RX ADMIN — Medication 100 GRAM(S): at 14:53

## 2019-08-05 RX ADMIN — METHOCARBAMOL 750 MILLIGRAM(S): 500 TABLET, FILM COATED ORAL at 15:08

## 2019-08-05 NOTE — PROGRESS NOTE ADULT - SUBJECTIVE AND OBJECTIVE BOX
Patient is a 54y old  Male who presents with a chief complaint of sepsis, foot wound (01 Aug 2019 13:18) s/p BKA controlling pain     Overnight AM events:  Patient seen and examined at beside. No acute events over night. POD #4 s/p Right BKA. Pt c/o phantom limb, states pain is well controlled.  Pt is tolerating PO diet w/o n/v/d. Small BM yday.   Nurse reports patient has labile emotions, from crying to happy. pt denies any s/s of depression or suicidal/homicidal ideations/intentions.     ROS: Denies CP, HA, SOB, n/v/c/d, fever/chills, abdominal/back pain, vision changes, numbness/tingling, blood in urine or stool     CAPILLARY BLOOD GLUCOSE  POCT Blood Glucose.: 132 mg/dL (08.04.19 @ 08:20)  POCT Blood Glucose.: 156 mg/dL (08.03.19 @ 21:30)  POCT Blood Glucose.: 119 mg/dL (08.03.19 @ 16:59)    Physical Examination  Vital Signs Last 24 Hrs  T(C): 37.6 (04 Aug 2019 15:47), Max: 37.6 (04 Aug 2019 15:47)  T(F): 99.6 (04 Aug 2019 15:47), Max: 99.6 (04 Aug 2019 15:47)  HR: 110 (05 Aug 2019 03:19) (110 - 131)  BP: 160/95 (05 Aug 2019 03:19) (135/89 - 160/95)  RR: 19 (04 Aug 2019 15:47) (18 - 19)  SpO2: 94% (04 Aug 2019 15:47) (93% - 94%)    GENERAL: Obese, WD WN, poor hygiene   HEENT: PERRLA, pupil constricted to light b/l, poor dentition   RESP: CTA b/l,, no crackles, no wheezing or rhonchi   CVS: RRR, Normal S1 & S2, No m/r/g  Abdomen: soft +BS normoactive, NT, ND, b/l groin intertriginous dermatitis - no signs of infection   Extremities: Rt foot s/p BKA with dressing C/D/I, L foot - s/p partial amputation of foot clean dry no evidence of infection   Neuro: AAOX3, non focal       LAB                        7.7    8.09  )-----------( 271      ( 04 Aug 2019 08:30 )             26.5     08-04    138  |  106  |  34.0<H>  ----------------------------<  142<H>  4.0   |  22.0  |  0.75    Ca    7.2<L>      04 Aug 2019 08:30  Phos  2.9     08-04  Mg     1.3     08-04    TPro  6.1<L>  /  Alb  1.6<L>  /  TBili  0.6  /  DBili  x   /  AST  46<H>  /  ALT  21  /  AlkPhos  911<H>  08-04     CARDIAC MARKERS ( 04 Aug 2019 11:37 )  x     / <0.01 ng/mL / x     / x     / x        CARDIAC MARKERS ( 01 Aug 2019 01:54 )  x     / <0.01 ng/mL / x     / x     / x      CARDIAC MARKERS ( 31 Jul 2019 21:07 )  x     / 0.01 ng/mL / 21 U/L / x     / x              IMAGING  < from: TTE Echo Complete w/Doppler (08.03.19 @ 13:18) >  Summary:   1. Technically limited study.   2. Normal left ventricular internal cavity size.   3. Normal global left ventricular systolic function.   4. Left ventricular ejection fraction, by visual estimation, is 65 to   70%.   5. The left ventricular diastolic function could not be assessed in this   study.   6. Normal right ventricular size and function.   7. Moderately enlarged left atrium.   8. Normal trileaflet aortic valve with normal opening.   9. Estimated pulmonary artery systolic pressure is 45.4 mmHg assuming a   right atrial pressure of 5 mmHg, which is consistent with mild pulmonary   hypertension.  10. There is no evidence of pericardial effusion.    < end of copied text >      < from: Xray Ankle Complete 3 Views, Right (07.31.19 @ 22:25) >  IMPRESSION:   Soft tissue swelling, ulcerations consistent with diffuse   osteomyelitis involving the phalanges and metatarsal bones tarsal bones   possibly ankle joint/show bilateral medial malleolus.    < end of copied text >    < from: Xray Foot AP + Lateral + Oblique, Right (07.31.19 @ 22:25) >  MPRESSION:   Soft tissue swelling, ulcerations consistent with diffuse   osteomyelitis involving the phalanges and metatarsal bones tarsal bones   possibly ankle joint/show bilateral medial malleolus.    < end of copied text >    < from: Xray Chest 1 View-PORTABLE IMMEDIATE (07.31.19 @ 20:04) >  Impression:  No evidence of acute cardiopulmonary disease. Allowing for differences in   technique, no significant change since 5/11/2016..    < end of copied text >    MEDICATIONS  (STANDING):  ascorbic acid 500 milliGRAM(s) Oral daily  dextrose 5%. 1000 milliLiter(s) (50 mL/Hr) IV Continuous <Continuous>  dextrose 50% Injectable 12.5 Gram(s) IV Push once  dextrose 50% Injectable 25 Gram(s) IV Push once  dextrose 50% Injectable 25 Gram(s) IV Push once  ferrous    sulfate 325 milliGRAM(s) Oral four times a day  folic acid 1 milliGRAM(s) Oral daily  gabapentin 300 milliGRAM(s) Oral two times a day  insulin lispro (HumaLOG) corrective regimen sliding scale   SubCutaneous three times a day before meals  methocarbamol 750 milliGRAM(s) Oral every 8 hours  multivitamin 1 Tablet(s) Oral daily  piperacillin/tazobactam IVPB.. 3.375 Gram(s) IV Intermittent every 8 hours  saccharomyces boulardii 250 milliGRAM(s) Oral two times a day  sodium chloride 0.9%. 2000 milliLiter(s) (75 mL/Hr) IV Continuous <Continuous>  thiamine 100 milliGRAM(s) Oral daily  vancomycin  IVPB 750 milliGRAM(s) IV Intermittent every 12 hours    MEDICATIONS  (PRN):  dextrose 40% Gel 15 Gram(s) Oral once PRN Blood Glucose LESS THAN 70 milliGRAM(s)/deciliter  docusate sodium 100 milliGRAM(s) Oral two times a day PRN Constipation  glucagon  Injectable 1 milliGRAM(s) IntraMuscular once PRN Glucose LESS THAN 70 milligrams/deciliter  HYDROmorphone   Tablet 4 milliGRAM(s) Oral every 3 hours PRN Moderate Pain (4 - 6)  HYDROmorphone   Tablet 6 milliGRAM(s) Oral every 6 hours PRN Severe Pain (7 - 10)  HYDROmorphone  Injectable 1 milliGRAM(s) SubCutaneous every 6 hours PRN Severe Pain persisting 1 hour after oral opioids  ketorolac 15 milliGRAM(s) Oral every 8 hours PRN Moderate Pain (4 - 6)

## 2019-08-05 NOTE — PROGRESS NOTE ADULT - ASSESSMENT
54y old  Male who presents with a chief complaint of sepsis, foot wound S/P emergent R Guillotine BKA    - Wound vac placed. To remain in place until OR  - Maintain R BKA elevated and will place immobilizer to help prevent contracture  - Per Pain management suboxone should be restarted on discharge. Pt reports he sees Dr Pierce in [place] for his suboxone.   - Per ID recs: will continue IV Abx until post revision. Discussed operative options with pt and Dr Lozano. Pt with contracture or RLE and short BKA. For most appropraite wound healing and for best functional outcome R AKA was recommended. Pt is accepting of R AKA. Scheduled for OR Wednesday 8/7  -Medical optimization prior to AKA

## 2019-08-05 NOTE — PROCEDURE NOTE - NSPROCDETAILS_GEN_ALL_CORE
location identified, draped/prepped, sterile technique used/blood seen on insertion/sterile technique, catheter placed/dressing applied/flushes easily/secured in place

## 2019-08-05 NOTE — PROGRESS NOTE ADULT - SUBJECTIVE AND OBJECTIVE BOX
HPI/OVERNIGHT EVENTS: Patient seen and examined at bedside. Denies fever, chills, nausea, vomitting, chest pain, SOB, dizziness,     Vital Signs Last 24 Hrs  T(C): 37.6 (04 Aug 2019 15:47), Max: 37.6 (04 Aug 2019 15:47)  T(F): 99.6 (04 Aug 2019 15:47), Max: 99.6 (04 Aug 2019 15:47)  HR: 110 (05 Aug 2019 03:19) (110 - 131)  BP: 160/95 (05 Aug 2019 03:19) (135/89 - 160/95)  BP(mean): --  RR: 19 (04 Aug 2019 15:47) (18 - 19)  SpO2: 94% (04 Aug 2019 15:47) (93% - 94%)    General: NAD  Extremities: R BKA dressing removed. Wound is pink, clean with appropriate areas of bleeding, bone appears healthy. Black sponge measured 8x10cm and applied with occlusive dressing. Good seal.    LABS:                         7.6    7.72  )-----------( 299      ( 05 Aug 2019 07:03 )             26.6   08-05    140  |  106  |  23.0<H>  ----------------------------<  196<H>  3.5   |  23.0  |  0.66    Ca    7.3<L>      05 Aug 2019 07:03  Phos  2.8     08-05  Mg     1.3     08-05    TPro  6.2<L>  /  Alb  1.9<L>  /  TBili  0.4  /  DBili  x   /  AST  27  /  ALT  18  /  AlkPhos  774<H>  08-05        MEDICATIONS  (STANDING):  ascorbic acid 500 milliGRAM(s) Oral daily  dextrose 5%. 1000 milliLiter(s) (50 mL/Hr) IV Continuous <Continuous>  dextrose 50% Injectable 12.5 Gram(s) IV Push once  dextrose 50% Injectable 25 Gram(s) IV Push once  dextrose 50% Injectable 25 Gram(s) IV Push once  docusate sodium 100 milliGRAM(s) Oral three times a day  enoxaparin Injectable 40 milliGRAM(s) SubCutaneous daily  ferrous    sulfate 325 milliGRAM(s) Oral four times a day  folic acid 1 milliGRAM(s) Oral daily  gabapentin 300 milliGRAM(s) Oral two times a day  insulin lispro (HumaLOG) corrective regimen sliding scale   SubCutaneous three times a day before meals  magnesium sulfate  IVPB 1 Gram(s) IV Intermittent once  methocarbamol 750 milliGRAM(s) Oral every 8 hours  metoprolol tartrate 25 milliGRAM(s) Oral two times a day  multivitamin 1 Tablet(s) Oral daily  pantoprazole    Tablet 40 milliGRAM(s) Oral before breakfast  piperacillin/tazobactam IVPB.. 3.375 Gram(s) IV Intermittent every 8 hours  saccharomyces boulardii 250 milliGRAM(s) Oral two times a day  senna 2 Tablet(s) Oral at bedtime  thiamine 100 milliGRAM(s) Oral daily  vancomycin  IVPB 750 milliGRAM(s) IV Intermittent every 12 hours    MEDICATIONS  (PRN):  aluminum hydroxide/magnesium hydroxide/simethicone Suspension 30 milliLiter(s) Oral every 6 hours PRN Dyspepsia  dextrose 40% Gel 15 Gram(s) Oral once PRN Blood Glucose LESS THAN 70 milliGRAM(s)/deciliter  glucagon  Injectable 1 milliGRAM(s) IntraMuscular once PRN Glucose LESS THAN 70 milligrams/deciliter  HYDROmorphone   Tablet 4 milliGRAM(s) Oral every 3 hours PRN Moderate Pain (4 - 6)  HYDROmorphone   Tablet 6 milliGRAM(s) Oral every 6 hours PRN Severe Pain (7 - 10)  HYDROmorphone  Injectable 1 milliGRAM(s) SubCutaneous every 6 hours PRN Severe Pain persisting 1 hour after oral opioids  ketorolac 15 milliGRAM(s) Oral every 8 hours PRN Moderate Pain (4 - 6)

## 2019-08-05 NOTE — PROGRESS NOTE ADULT - SUBJECTIVE AND OBJECTIVE BOX
54y old  Male seen bedside for left foot ulcer.    HPI: Pt seen bedside with BKA on right. States he is feeling much better than when he first came in. States that left foot dressing was changed by the nurse this AM.  Denies f/c/n/v/sob.       PMH: Hyperlipidemia  HTN (hypertension)  DM (diabetes mellitus)    Allergies: Allergy Status Unknown    Medications: acetaminophen   Tablet .. 975 milliGRAM(s) Oral once  Dakins Solution - 1/2 Strength 1 Application(s) Topical Once  dextrose 50% Injectable 50 milliLiter(s) IV Push Once  insulin regular  human recombinant. 6 Unit(s) IV Push once  vancomycin  IVPB 1000 milliGRAM(s) IV Intermittent once    FH:  PSX: Amputation of toe, left, traumatic  Amputation of great toe, right, traumatic    SH: acetaminophen   Tablet .. 975 milliGRAM(s) Oral once  Dakins Solution - 1/2 Strength 1 Application(s) Topical Once  dextrose 50% Injectable 50 milliLiter(s) IV Push Once  insulin regular  human recombinant. 6 Unit(s) IV Push once  vancomycin  IVPB 1000 milliGRAM(s) IV Intermittent once    ICU Vital Signs Last 24 Hrs  T(C): 37.6 (04 Aug 2019 15:47), Max: 37.6 (04 Aug 2019 15:47)  T(F): 99.6 (04 Aug 2019 15:47), Max: 99.6 (04 Aug 2019 15:47)  HR: 110 (05 Aug 2019 03:19) (110 - 131)  BP: 160/95 (05 Aug 2019 03:19) (135/89 - 160/95)  BP(mean): --  ABP: --  ABP(mean): --  RR: 19 (04 Aug 2019 15:47) (18 - 19)  SpO2: 94% (04 Aug 2019 15:47) (93% - 94%)                          7.6    7.72  )-----------( 299      ( 05 Aug 2019 07:03 )             26.6   WBC Count: 7.72 K/uL (08-05 @ 07:03)  WBC Count: 8.09 K/uL (08-04 @ 08:30)  WBC Count: 9.26 K/uL (08-03 @ 12:31)  WBC Count: 9.55 K/uL (08-02 @ 07:43)  WBC Count: 16.25 K/uL (08-01 @ 16:47)  Sedimentation Rate, Erythrocyte: 61 mm/hr (08-01-19 @ 16:47)  C-Reactive Protein, Serum: 24.40 mg/dL (08-01-19 @ 16:47)        PHYSICAL EXAM  GEN: BREANNA MENDOZA is a pleasant well-nourished, well developed 54y Male in no acute distress, alert awake, and oriented to person, place and time.   LE Focused:    Vasc:  DP/PT nonpalpable b/l, unable to determine CFT, edema and gangrenous changes to right foot  Derm:. Left foot plantar ulcer ~7cm x 5cm with fibrogranular base and hyperkeratotic borders. no clinical sign of infection    Neuro: Protective sensation to digits   MSK: TMA left foot, BKA right     < from: Xray Foot AP + Lateral + Oblique, Right (07.31.19 @ 22:25) >     EXAM:  FOOT-RIGHT                         EXAM:  ANKLE-RIGHT                          PROCEDURE DATE:  07/31/2019          INTERPRETATION:  Radiographs of the RIGHT ankle       Radiographs of the RIGHT foot  CLINICAL INFORMATION: Soft tissue ulcerations. Assess for osteomyelitis.    TECHNIQUE:   Frontal, oblique and lateral views of the ankle were   obtained.  AP lateral oblique views of the RIGHT foot  FINDINGS:   No prior examinations are available for review.    Diffuse soft tissue swellingnoted with multiple soft tissue ulcerations.   There is rocker-bottom deformity with a destructive bone lesions of the   lateral medial malleolus, but tarsal bones and metatarsal bones with   pathologic fracture of the first metatarsal bone. Findingsconsistent   with a osteomyelitis. Distal phalanx of third digit absent.   IMPRESSION:   Soft tissue swelling, ulcerations consistent with diffuse   osteomyelitis involving the phalanges and metatarsal bones tarsal bones   possibly ankle joint/show bilateral medial malleolus.                  PREET LONG M.D., ATTENDING RADIOLOGIST  This document has been electronically signed. Aug  1 2019  7:13AM                  < end of copied text >    Left foot xray pending     A:  Left foot ulcer     P:  Patient evaluates, chart reviewed  X-rays left foot pending  Dressed L foot with betadine DSD  Rx Silvadene for next dressing change  Discussed treatment and plan with patient   Podiatry will follow while in house  Discussed with attending Dr. Morris    Wound Care Instructions:  1- Please apply silvadene to gauze and place on left plantar ulcer  2- Please secure with kerlix   3- Please change dressing daily

## 2019-08-05 NOTE — PROGRESS NOTE ADULT - SUBJECTIVE AND OBJECTIVE BOX
[facility]  INFECTIOUS DISEASES AND INTERNAL MEDICINE at [place]  =======================================================  Rupesh Santa MD  Diplomates American Board of Internal Medicine and Infectious Diseases  Tel: [phone]      Fax: [phone]  =======================================================    BREANNA MENDOZA [id number]    Follow up: maggot infestation of wound; RIGHT BKA stump infection  s/p RIGHT BKA 8/1/19    No fever or chills  No diarrhea - reports LOOSE bowel movements  No abdominal pain  Wound vac in place    Allergies:  Allergy Status Unknown    Antibiotics:  piperacillin/tazobactam IVPB.. 3.375 Gram(s) IV Intermittent every 8 hours  vancomycin  IVPB 750 milliGRAM(s) IV Intermittent every 12 hours    REVIEW OF SYSTEMS:  as above    Physical Exam:  T(F): 99.2 (05 Aug 2019 07:55), Max: 99.6 (04 Aug 2019 15:47)  HR: 125 (05 Aug 2019 07:55)  BP: 160/95 (05 Aug 2019 03:19)  RR: 19 (05 Aug 2019 07:55)  SpO2: 96% (05 Aug 2019 07:55) (93% - 96%)  temp max in last 48H T(F): , Max: 99.6 (08-04-19 @ 15:47)    GEN: NAD, pleasant  HEENT: normocephalic and atraumatic. EOMI. PERRL.  Anicteric   NECK: Supple.   LUNGS: Clear to auscultation.  HEART: Regular rate and rhythm  ABDOMEN: Soft, nontender, and nondistended.  Positive bowel sounds.    : No CVA tenderness  EXTREMITIES: Without any edema.  MSK: no joint swelling  NEUROLOGIC: No focal deficits  PSYCHIATRIC: Appropriate affect .  SKIN: RLE BKA stump with wound vac; edges of skin appears clean an intact; left TMA stump with dressing in place      Labs:                        7.6    7.72  )-----------( 299      ( 05 Aug 2019 07:03 )             26.6       WBC Count: 7.72 K/uL (08-05-19 @ 07:03)  WBC Count: 8.09 K/uL (08-04-19 @ 08:30)  WBC Count: 9.26 K/uL (08-03-19 @ 12:31)  WBC Count: 9.55 K/uL (08-02-19 @ 07:43)  WBC Count: 16.25 K/uL (08-01-19 @ 16:47)  WBC Count: 15.83 K/uL (08-01-19 @ 13:34)  WBC Count: 18.97 K/uL (08-01-19 @ 01:54)  WBC Count: 20.91 K/uL (07-31-19 @ 19:33)      08-05    140  |  106  |  23.0<H>  ----------------------------<  196<H>  3.5   |  23.0  |  0.66    Ca    7.3<L>      05 Aug 2019 07:03  Phos  2.8     08-05  Mg     1.3     08-05    TPro  6.2<L>  /  Alb  1.9<L>  /  TBili  0.4  /  DBili  x   /  AST  27  /  ALT  18  /  AlkPhos  774<H>  08-05      Culture - Urine (collected 08-01-19 @ 05:26)  Source: .Urine  Final Report (08-02-19 @ 10:09):    No growth    Culture - Blood (collected 07-31-19 @ 19:34)  Source: .Blood    Culture - Blood (collected 07-31-19 @ 19:34)  Source: .Blood

## 2019-08-05 NOTE — PROGRESS NOTE ADULT - ASSESSMENT
54M hx HTN, HLD, DM2, ETOH abuse, IVDU (on suboxone), multiple toe/foot amputations presents with RLE foot ulcer and pain x 5 months.  In ED temp of 99.7, heart rate 150 (improved to 126 s/p ivf bolus), bp 86/54 (improved to 97/54), RR- 22 saturating 95% on room air.   Labs initially WBC 20, Hb 8, Na 123, K 7, Cr 2    Overall, severe sepsis secondary to wet gangrene, maggot infestation of wound, s/p RBANT, leukocytosis, anemia, uncontrolled diabetes  s/p Right BKA 8/1/19 . Afebrile, BP improved, leukocytosis and BRANT resolved.      - Blood cultures no growth  - no wound Cx sent  - Continue Antibiotics:  piperacillin/tazobactam IVPB.. 3.375 Gram(s) IV Intermittent every 8 hours  vancomycin  IVPB 750 milliGRAM(s) IV Intermittent every 12 hours  - Will check MRSA PCR today  - if MRSA PCR negative, will consider stopping Vancomycin    D/C isolation for Wound maggots    - Plan for antibiotics till 4 days post BKA revision if Clinically stable post op  tentatively scheduled 8/8/19      Will Follow

## 2019-08-06 ENCOUNTER — TRANSCRIPTION ENCOUNTER (OUTPATIENT)
Age: 54
End: 2019-08-06

## 2019-08-06 LAB
ANION GAP SERPL CALC-SCNC: 12 MMOL/L — SIGNIFICANT CHANGE UP (ref 5–17)
ANION GAP SERPL CALC-SCNC: 9 MMOL/L — SIGNIFICANT CHANGE UP (ref 5–17)
BASOPHILS # BLD AUTO: 0.06 K/UL — SIGNIFICANT CHANGE UP (ref 0–0.2)
BASOPHILS NFR BLD AUTO: 0.9 % — SIGNIFICANT CHANGE UP (ref 0–2)
BLD GP AB SCN SERPL QL: SIGNIFICANT CHANGE UP
BUN SERPL-MCNC: 17 MG/DL — SIGNIFICANT CHANGE UP (ref 8–20)
BUN SERPL-MCNC: 18 MG/DL — SIGNIFICANT CHANGE UP (ref 8–20)
CALCIUM SERPL-MCNC: 7.3 MG/DL — LOW (ref 8.6–10.2)
CALCIUM SERPL-MCNC: 7.6 MG/DL — LOW (ref 8.6–10.2)
CHLORIDE SERPL-SCNC: 104 MMOL/L — SIGNIFICANT CHANGE UP (ref 98–107)
CHLORIDE SERPL-SCNC: 105 MMOL/L — SIGNIFICANT CHANGE UP (ref 98–107)
CO2 SERPL-SCNC: 20 MMOL/L — LOW (ref 22–29)
CO2 SERPL-SCNC: 24 MMOL/L — SIGNIFICANT CHANGE UP (ref 22–29)
CREAT SERPL-MCNC: 0.84 MG/DL — SIGNIFICANT CHANGE UP (ref 0.5–1.3)
CREAT SERPL-MCNC: 0.84 MG/DL — SIGNIFICANT CHANGE UP (ref 0.5–1.3)
EOSINOPHIL # BLD AUTO: 0.3 K/UL — SIGNIFICANT CHANGE UP (ref 0–0.5)
EOSINOPHIL NFR BLD AUTO: 4.3 % — SIGNIFICANT CHANGE UP (ref 0–6)
GLUCOSE BLDC GLUCOMTR-MCNC: 224 MG/DL — HIGH (ref 70–99)
GLUCOSE BLDC GLUCOMTR-MCNC: 242 MG/DL — HIGH (ref 70–99)
GLUCOSE BLDC GLUCOMTR-MCNC: 243 MG/DL — HIGH (ref 70–99)
GLUCOSE SERPL-MCNC: 224 MG/DL — HIGH (ref 70–115)
GLUCOSE SERPL-MCNC: 297 MG/DL — HIGH (ref 70–115)
HCT VFR BLD CALC: 26.9 % — LOW (ref 39–50)
HGB BLD-MCNC: 7.7 G/DL — LOW (ref 13–17)
IMM GRANULOCYTES NFR BLD AUTO: 2.3 % — HIGH (ref 0–1.5)
LYMPHOCYTES # BLD AUTO: 1.27 K/UL — SIGNIFICANT CHANGE UP (ref 1–3.3)
LYMPHOCYTES # BLD AUTO: 18.2 % — SIGNIFICANT CHANGE UP (ref 13–44)
MAGNESIUM SERPL-MCNC: 1.3 MG/DL — LOW (ref 1.6–2.6)
MCHC RBC-ENTMCNC: 25.4 PG — LOW (ref 27–34)
MCHC RBC-ENTMCNC: 28.6 GM/DL — LOW (ref 32–36)
MCV RBC AUTO: 88.8 FL — SIGNIFICANT CHANGE UP (ref 80–100)
MONOCYTES # BLD AUTO: 0.61 K/UL — SIGNIFICANT CHANGE UP (ref 0–0.9)
MONOCYTES NFR BLD AUTO: 8.7 % — SIGNIFICANT CHANGE UP (ref 2–14)
NEUTROPHILS # BLD AUTO: 4.59 K/UL — SIGNIFICANT CHANGE UP (ref 1.8–7.4)
NEUTROPHILS NFR BLD AUTO: 65.6 % — SIGNIFICANT CHANGE UP (ref 43–77)
PHOSPHATE SERPL-MCNC: 2.5 MG/DL — SIGNIFICANT CHANGE UP (ref 2.4–4.7)
PLATELET # BLD AUTO: 322 K/UL — SIGNIFICANT CHANGE UP (ref 150–400)
POTASSIUM SERPL-MCNC: 3.1 MMOL/L — LOW (ref 3.5–5.3)
POTASSIUM SERPL-MCNC: 3.9 MMOL/L — SIGNIFICANT CHANGE UP (ref 3.5–5.3)
POTASSIUM SERPL-SCNC: 3.1 MMOL/L — LOW (ref 3.5–5.3)
POTASSIUM SERPL-SCNC: 3.9 MMOL/L — SIGNIFICANT CHANGE UP (ref 3.5–5.3)
RBC # BLD: 3.03 M/UL — LOW (ref 4.2–5.8)
RBC # FLD: 16.8 % — HIGH (ref 10.3–14.5)
SODIUM SERPL-SCNC: 136 MMOL/L — SIGNIFICANT CHANGE UP (ref 135–145)
SODIUM SERPL-SCNC: 138 MMOL/L — SIGNIFICANT CHANGE UP (ref 135–145)
VANCOMYCIN TROUGH SERPL-MCNC: 13.9 UG/ML — SIGNIFICANT CHANGE UP (ref 10–20)
WBC # BLD: 6.99 K/UL — SIGNIFICANT CHANGE UP (ref 3.8–10.5)
WBC # FLD AUTO: 6.99 K/UL — SIGNIFICANT CHANGE UP (ref 3.8–10.5)

## 2019-08-06 PROCEDURE — 71275 CT ANGIOGRAPHY CHEST: CPT | Mod: 26

## 2019-08-06 PROCEDURE — 99232 SBSQ HOSP IP/OBS MODERATE 35: CPT

## 2019-08-06 PROCEDURE — 99233 SBSQ HOSP IP/OBS HIGH 50: CPT | Mod: GC

## 2019-08-06 RX ORDER — HYDROMORPHONE HYDROCHLORIDE 2 MG/ML
4 INJECTION INTRAMUSCULAR; INTRAVENOUS; SUBCUTANEOUS EVERY 4 HOURS
Refills: 0 | Status: DISCONTINUED | OUTPATIENT
Start: 2019-08-06 | End: 2019-08-07

## 2019-08-06 RX ORDER — METHOCARBAMOL 500 MG/1
750 TABLET, FILM COATED ORAL EVERY 8 HOURS
Refills: 0 | Status: DISCONTINUED | OUTPATIENT
Start: 2019-08-06 | End: 2019-08-07

## 2019-08-06 RX ORDER — ACETAMINOPHEN 500 MG
975 TABLET ORAL EVERY 8 HOURS
Refills: 0 | Status: DISCONTINUED | OUTPATIENT
Start: 2019-08-06 | End: 2019-08-07

## 2019-08-06 RX ORDER — KETOROLAC TROMETHAMINE 30 MG/ML
10 SYRINGE (ML) INJECTION EVERY 8 HOURS
Refills: 0 | Status: DISCONTINUED | OUTPATIENT
Start: 2019-08-06 | End: 2019-08-07

## 2019-08-06 RX ORDER — POTASSIUM CHLORIDE 20 MEQ
40 PACKET (EA) ORAL EVERY 4 HOURS
Refills: 0 | Status: COMPLETED | OUTPATIENT
Start: 2019-08-06 | End: 2019-08-06

## 2019-08-06 RX ORDER — SODIUM CHLORIDE 9 MG/ML
1000 INJECTION INTRAMUSCULAR; INTRAVENOUS; SUBCUTANEOUS
Refills: 0 | Status: DISCONTINUED | OUTPATIENT
Start: 2019-08-06 | End: 2019-08-07

## 2019-08-06 RX ADMIN — HYDROMORPHONE HYDROCHLORIDE 6 MILLIGRAM(S): 2 INJECTION INTRAMUSCULAR; INTRAVENOUS; SUBCUTANEOUS at 10:07

## 2019-08-06 RX ADMIN — Medication 40 MILLIEQUIVALENT(S): at 20:56

## 2019-08-06 RX ADMIN — PIPERACILLIN AND TAZOBACTAM 25 GRAM(S): 4; .5 INJECTION, POWDER, LYOPHILIZED, FOR SOLUTION INTRAVENOUS at 13:46

## 2019-08-06 RX ADMIN — Medication 1 TABLET(S): at 13:45

## 2019-08-06 RX ADMIN — PANTOPRAZOLE SODIUM 40 MILLIGRAM(S): 20 TABLET, DELAYED RELEASE ORAL at 06:29

## 2019-08-06 RX ADMIN — Medication 250 MILLIGRAM(S): at 06:29

## 2019-08-06 RX ADMIN — Medication 1 MILLIGRAM(S): at 13:47

## 2019-08-06 RX ADMIN — Medication 1 APPLICATION(S): at 13:46

## 2019-08-06 RX ADMIN — Medication 975 MILLIGRAM(S): at 23:18

## 2019-08-06 RX ADMIN — HYDROMORPHONE HYDROCHLORIDE 6 MILLIGRAM(S): 2 INJECTION INTRAMUSCULAR; INTRAVENOUS; SUBCUTANEOUS at 10:37

## 2019-08-06 RX ADMIN — HYDROMORPHONE HYDROCHLORIDE 4 MILLIGRAM(S): 2 INJECTION INTRAMUSCULAR; INTRAVENOUS; SUBCUTANEOUS at 19:08

## 2019-08-06 RX ADMIN — Medication 25 MILLIGRAM(S): at 06:29

## 2019-08-06 RX ADMIN — Medication 25 MILLIGRAM(S): at 18:39

## 2019-08-06 RX ADMIN — Medication 250 MILLIGRAM(S): at 10:06

## 2019-08-06 RX ADMIN — Medication 40 MILLIEQUIVALENT(S): at 18:36

## 2019-08-06 RX ADMIN — Medication 250 MILLIGRAM(S): at 18:39

## 2019-08-06 RX ADMIN — PIPERACILLIN AND TAZOBACTAM 25 GRAM(S): 4; .5 INJECTION, POWDER, LYOPHILIZED, FOR SOLUTION INTRAVENOUS at 06:29

## 2019-08-06 RX ADMIN — METHOCARBAMOL 750 MILLIGRAM(S): 500 TABLET, FILM COATED ORAL at 22:48

## 2019-08-06 RX ADMIN — Medication 975 MILLIGRAM(S): at 22:48

## 2019-08-06 RX ADMIN — HYDROMORPHONE HYDROCHLORIDE 4 MILLIGRAM(S): 2 INJECTION INTRAMUSCULAR; INTRAVENOUS; SUBCUTANEOUS at 18:38

## 2019-08-06 RX ADMIN — GABAPENTIN 300 MILLIGRAM(S): 400 CAPSULE ORAL at 06:29

## 2019-08-06 RX ADMIN — HYDROMORPHONE HYDROCHLORIDE 4 MILLIGRAM(S): 2 INJECTION INTRAMUSCULAR; INTRAVENOUS; SUBCUTANEOUS at 23:17

## 2019-08-06 RX ADMIN — ENOXAPARIN SODIUM 40 MILLIGRAM(S): 100 INJECTION SUBCUTANEOUS at 13:46

## 2019-08-06 RX ADMIN — GABAPENTIN 300 MILLIGRAM(S): 400 CAPSULE ORAL at 18:38

## 2019-08-06 RX ADMIN — Medication 100 MILLIGRAM(S): at 13:45

## 2019-08-06 RX ADMIN — HYDROMORPHONE HYDROCHLORIDE 4 MILLIGRAM(S): 2 INJECTION INTRAMUSCULAR; INTRAVENOUS; SUBCUTANEOUS at 22:47

## 2019-08-06 RX ADMIN — PIPERACILLIN AND TAZOBACTAM 25 GRAM(S): 4; .5 INJECTION, POWDER, LYOPHILIZED, FOR SOLUTION INTRAVENOUS at 22:49

## 2019-08-06 RX ADMIN — Medication 40 MILLIEQUIVALENT(S): at 13:45

## 2019-08-06 RX ADMIN — Medication 500 MILLIGRAM(S): at 13:45

## 2019-08-06 NOTE — PROGRESS NOTE ADULT - ASSESSMENT
54M hx HTN, HLD, DM2, ETOH abuse, IVDU (on suboxone), multiple toe/foot amputations presents with RLE foot ulcer and pain x 5 months.  In ED temp of 99.7, heart rate 150 (improved to 126 s/p ivf bolus), bp 86/54 (improved to 97/54), RR- 22 saturating 95% on room air.   Labs initially WBC 20, Hb 8, Na 123, K 7, Cr 2    Overall, severe sepsis secondary to wet gangrene, maggot infestation of wound, s/p BRANT, leukocytosis, anemia, uncontrolled diabetes  s/p Right BKA 8/1/19 . Afebrile, BP improved, leukocytosis and BRANT resolved.      - Blood cultures no growth  - no wound Cx sent  - Continue Antibiotics:  piperacillin/tazobactam IVPB.. 3.375 Gram(s) IV Intermittent every 8 hours  - MRSA PCR is negative, Vancomycin stopped    D/C isolation for Wound maggots    - Plan for antibiotics till 4 days post BKA revision if Clinically stable post op  tentatively scheduled 8/7/19    NO CONTRAINDICATION FROM infectious disease for revision of right BKA to right AKA.     Will Follow

## 2019-08-06 NOTE — PROGRESS NOTE ADULT - SUBJECTIVE AND OBJECTIVE BOX
Patient is a 54y old  Male who presents with a chief complaint of sepsis, foot wound (01 Aug 2019 13:18) s/p BKA controlling pain     Overnight AM events:  Patient seen and examined at beside. No acute events over night. POD #5 s/p Right BKA. Pt c/o phantom limb, states pain is well controlled. When pain is present, he c/o of shooting pain. Pt also states she had multiple episodes of diarrhea, bowel regimen will be discontinued. Pt is tolerating PO diet w/o n/v. +diarrhea.    Nurse reports patient has labile emotions, from crying to happy. pt denies any s/s of depression or suicidal/homicidal ideations/intentions.     ROS: Denies CP, HA, SOB, n/v/, fever/chills, abdominal/back pain, vision changes, numbness/tingling, blood in urine or stool     CAPILLARY BLOOD GLUCOSE  POCT Blood Glucose.: 242 mg/dL (08.06.19 @ 12:14)  POCT Blood Glucose.: 224 mg/dL (08.06.19 @ 07:42)  POCT Blood Glucose.: 268 mg/dL (08.05.19 @ 16:24)    Physical Examination  Vital Signs Last 24 Hrs  T(C): 36.8 (06 Aug 2019 07:50), Max: 36.9 (05 Aug 2019 15:56)  T(F): 98.2 (06 Aug 2019 07:50), Max: 98.5 (05 Aug 2019 15:56)  HR: 107 (06 Aug 2019 07:50) (107 - 127)  BP: 158/91 (06 Aug 2019 07:50) (150/91 - 165/99)  RR: 19 (06 Aug 2019 07:50) (18 - 20)  SpO2: 97% (06 Aug 2019 07:50) (95% - 97%)    GENERAL: Obese, WD WN, poor hygiene   HEENT: PERRLA, pupil constricted to light b/l, poor dentition   RESP: CTA b/l,, no crackles, no wheezing or rhonchi   CVS: RRR, Normal S1 & S2, No m/r/g  Abdomen: soft +BS normoactive, NT, ND, b/l groin intertriginous dermatitis - no signs of infection   Extremities: Rt foot s/p BKA with wound vac, L foot - superficial ulcer 4x5cm, no active drainange, s/p partial amputation of foot clean dry no evidence of infection   Neuro: AAOX3, non focal     LAB                         7.7    6.99  )-----------( 322      ( 06 Aug 2019 07:59 )             26.9     08-06    138  |  105  |  18.0  ----------------------------<  224<H>  3.1<L>   |  24.0  |  0.84    Ca    7.3<L>      06 Aug 2019 07:59  Phos  2.8     08-05  Mg     1.3     08-05    TPro  6.2<L>  /  Alb  1.9<L>  /  TBili  0.4  /  DBili  x   /  AST  27  /  ALT  18  /  AlkPhos  774<H>  08-05      IMAGING  < from: TTE Echo Complete w/Doppler (08.03.19 @ 13:18) >  Summary:   1. Technically limited study.   2. Normal left ventricular internal cavity size.   3. Normal global left ventricular systolic function.   4. Left ventricular ejection fraction, by visual estimation, is 65 to   70%.   5. The left ventricular diastolic function could not be assessed in this   study.   6. Normal right ventricular size and function.   7. Moderately enlarged left atrium.   8. Normal trileaflet aortic valve with normal opening.   9. Estimated pulmonary artery systolic pressure is 45.4 mmHg assuming a   right atrial pressure of 5 mmHg, which is consistent with mild pulmonary   hypertension.  10. There is no evidence of pericardial effusion.    < end of copied text >      < from: Xray Ankle Complete 3 Views, Right (07.31.19 @ 22:25) >  IMPRESSION:   Soft tissue swelling, ulcerations consistent with diffuse   osteomyelitis involving the phalanges and metatarsal bones tarsal bones   possibly ankle joint/show bilateral medial malleolus.    < end of copied text >    < from: Xray Foot AP + Lateral + Oblique, Right (07.31.19 @ 22:25) >  MPRESSION:   Soft tissue swelling, ulcerations consistent with diffuse   osteomyelitis involving the phalanges and metatarsal bones tarsal bones   possibly ankle joint/show bilateral medial malleolus.    < end of copied text >    < from: Xray Chest 1 View-PORTABLE IMMEDIATE (07.31.19 @ 20:04) >  Impression:  No evidence of acute cardiopulmonary disease. Allowing for differences in   technique, no significant change since 5/11/2016..    < end of copied text >        MEDICATIONS  (STANDING):  ascorbic acid 500 milliGRAM(s) Oral daily  dextrose 5%. 1000 milliLiter(s) (50 mL/Hr) IV Continuous <Continuous>  dextrose 50% Injectable 12.5 Gram(s) IV Push once  dextrose 50% Injectable 25 Gram(s) IV Push once  dextrose 50% Injectable 25 Gram(s) IV Push once  enoxaparin Injectable 40 milliGRAM(s) SubCutaneous daily  ferrous    sulfate 325 milliGRAM(s) Oral four times a day  folic acid 1 milliGRAM(s) Oral daily  gabapentin 300 milliGRAM(s) Oral two times a day  insulin lispro (HumaLOG) corrective regimen sliding scale   SubCutaneous three times a day before meals  metoprolol tartrate 25 milliGRAM(s) Oral two times a day  multivitamin 1 Tablet(s) Oral daily  pantoprazole    Tablet 40 milliGRAM(s) Oral before breakfast  piperacillin/tazobactam IVPB.. 3.375 Gram(s) IV Intermittent every 8 hours  potassium chloride    Tablet ER 40 milliEquivalent(s) Oral every 4 hours  saccharomyces boulardii 250 milliGRAM(s) Oral two times a day  silver sulfADIAZINE 1% Cream 1 Application(s) Topical daily  sodium chloride 0.9%. 1000 milliLiter(s) (75 mL/Hr) IV Continuous <Continuous>  thiamine 100 milliGRAM(s) Oral daily  vancomycin  IVPB 750 milliGRAM(s) IV Intermittent every 12 hours    MEDICATIONS  (PRN):  aluminum hydroxide/magnesium hydroxide/simethicone Suspension 30 milliLiter(s) Oral every 6 hours PRN Dyspepsia  dextrose 40% Gel 15 Gram(s) Oral once PRN Blood Glucose LESS THAN 70 milliGRAM(s)/deciliter  glucagon  Injectable 1 milliGRAM(s) IntraMuscular once PRN Glucose LESS THAN 70 milligrams/deciliter  HYDROmorphone   Tablet 4 milliGRAM(s) Oral every 3 hours PRN Moderate Pain (4 - 6)  HYDROmorphone   Tablet 6 milliGRAM(s) Oral every 6 hours PRN Severe Pain (7 - 10)  HYDROmorphone  Injectable 1 milliGRAM(s) SubCutaneous every 6 hours PRN Severe Pain persisting 1 hour after oral opioids  ketorolac 10 milliGRAM(s) Oral every 8 hours PRN Mild Pain (1 - 3)

## 2019-08-06 NOTE — PROGRESS NOTE ADULT - SUBJECTIVE AND OBJECTIVE BOX
Anesthesia Pre Op note.     Pt is a 54 year old male with a history of HTN, HLD, DM, PVD scheduled for revision amputation. Chart reviewed, Labs checked. See anesthesia record for full pre-op. K is 3.1 Please replenish and recheck.    Merlin Moran MD

## 2019-08-06 NOTE — PROGRESS NOTE ADULT - SUBJECTIVE AND OBJECTIVE BOX
54y old  Male seen bedside for left foot ulcer.    HPI: Pt seen bedside with BKA on right. States he is feeling much better than when he first came in. States that left foot dressing was changed by the nurse this AM.  Denies f/c/n/v/sob.       PMH: Hyperlipidemia  HTN (hypertension)  DM (diabetes mellitus)    Allergies: Allergy Status Unknown    Medications: acetaminophen   Tablet .. 975 milliGRAM(s) Oral once  Dakins Solution - 1/2 Strength 1 Application(s) Topical Once  dextrose 50% Injectable 50 milliLiter(s) IV Push Once  insulin regular  human recombinant. 6 Unit(s) IV Push once  vancomycin  IVPB 1000 milliGRAM(s) IV Intermittent once    FH:  PSX: Amputation of toe, left, traumatic  Amputation of great toe, right, traumatic    SH: acetaminophen   Tablet .. 975 milliGRAM(s) Oral once  Dakins Solution - 1/2 Strength 1 Application(s) Topical Once  dextrose 50% Injectable 50 milliLiter(s) IV Push Once  insulin regular  human recombinant. 6 Unit(s) IV Push once  vancomycin  IVPB 1000 milliGRAM(s) IV Intermittent once    Vital Signs Last 24 Hrs  T(C): 36.6 (06 Aug 2019 00:11), Max: 36.9 (05 Aug 2019 15:56)  T(F): 97.9 (06 Aug 2019 00:11), Max: 98.5 (05 Aug 2019 15:56)  HR: 113 (06 Aug 2019 06:29) (113 - 127)  BP: 165/99 (06 Aug 2019 06:29) (150/91 - 165/99)  BP(mean): --  RR: 18 (06 Aug 2019 00:11) (18 - 20)  SpO2: 95% (06 Aug 2019 00:11) (95% - 96%)    LABS                                   7.7    6.99  )-----------( 322      ( 06 Aug 2019 07:59 )             26.9     WBC Count: 6.99 K/uL (08-06 @ 07:59)  WBC Count: 7.72 K/uL (08-05 @ 07:03)  WBC Count: 8.09 K/uL (08-04 @ 08:30)  WBC Count: 9.26 K/uL (08-03 @ 12:31)  WBC Count: 9.55 K/uL (08-02 @ 07:43)    08-06    138  |  105  |  18.0  ----------------------------<  224<H>  3.1<L>   |  24.0  |  0.84    Ca    7.3<L>      06 Aug 2019 07:59  Phos  2.8     08-05  Mg     1.3     08-05    TPro  6.2<L>  /  Alb  1.9<L>  /  TBili  0.4  /  DBili  x   /  AST  27  /  ALT  18  /  AlkPhos  774<H>  08-05    Sedimentation Rate, Erythrocyte (08.01.19 @ 16:47)    Sedimentation Rate, Erythrocyte: 61 mm/hr    C-Reactive Protein, Serum (08.01.19 @ 16:47)    C-Reactive Protein, Serum: 24.40 mg/dL          PHYSICAL EXAM  GEN: BERANNA MENDOZA is a pleasant well-nourished, well developed 54y Male in no acute distress, alert awake, and oriented to person, place and time.   LE Focused:    Vasc:  DP/PT nonpalpable b/l, unable to determine CFT, edema and gangrenous changes to right foot  Derm:. Left foot plantar ulcer ~7cm x 5cm with fibrogranular base and hyperkeratotic borders. no clinical sign of infection    Neuro: Protective sensation to digits   MSK: TMA left foot, BKA right     < from: Xray Foot AP + Lateral + Oblique, Right (07.31.19 @ 22:25) >     EXAM:  FOOT-RIGHT                         EXAM:  ANKLE-RIGHT                          PROCEDURE DATE:  07/31/2019          INTERPRETATION:  Radiographs of the RIGHT ankle       Radiographs of the RIGHT foot  CLINICAL INFORMATION: Soft tissue ulcerations. Assess for osteomyelitis.    TECHNIQUE:   Frontal, oblique and lateral views of the ankle were   obtained.  AP lateral oblique views of the RIGHT foot  FINDINGS:   No prior examinations are available for review.    Diffuse soft tissue swellingnoted with multiple soft tissue ulcerations.   There is rocker-bottom deformity with a destructive bone lesions of the   lateral medial malleolus, but tarsal bones and metatarsal bones with   pathologic fracture of the first metatarsal bone. Findingsconsistent   with a osteomyelitis. Distal phalanx of third digit absent.   IMPRESSION:   Soft tissue swelling, ulcerations consistent with diffuse   osteomyelitis involving the phalanges and metatarsal bones tarsal bones   possibly ankle joint/show bilateral medial malleolus.                  PREET LONG M.D., ATTENDING RADIOLOGIST  This document has been electronically signed. Aug  1 2019  7:13AM                  < end of copied text >      A:  Left foot ulcer     P:  Patient evaluates, chart reviewed  X-rays left foot pending  Dressed L foot with Silvadene/DSD  Discussed treatment and plan with patient   Podiatry will follow while in house  Seen with attending Dr. Khan    Wound Care Instructions:  1- Please apply silvadene to gauze and place on left plantar ulcer  2- Please secure with kerlix   3- Please change dressing daily

## 2019-08-06 NOTE — PROGRESS NOTE ADULT - SUBJECTIVE AND OBJECTIVE BOX
HPI/OVERNIGHT EVENTS: Patient seen and examined at bedside. Denies fever, chills, nausea, vomitting, chest pain, SOB, dizziness,   Had wound vac placed to RLE Angie LEW  Discussed AKA procedure again with pt who is willing to procede    Vital Signs Last 24 Hrs  T(C): 36.6 (06 Aug 2019 00:11), Max: 36.9 (05 Aug 2019 15:56)  T(F): 97.9 (06 Aug 2019 00:11), Max: 98.5 (05 Aug 2019 15:56)  HR: 113 (06 Aug 2019 06:29) (113 - 127)  BP: 165/99 (06 Aug 2019 06:29) (150/91 - 165/99)  BP(mean): --  RR: 18 (06 Aug 2019 00:11) (18 - 20)  SpO2: 95% (06 Aug 2019 00:11) (95% - 96%)  I&O's Summary    05 Aug 2019 07:01  -  06 Aug 2019 07:00  --------------------------------------------------------  IN: 1260 mL / OUT: 0 mL / NET: 1260 mL        General: NAD  Extremities: R BKA with wound vac dressing in place. Good seal.    LABS:                         7.7    6.99  )-----------( 322      ( 06 Aug 2019 07:59 )             26.9   08-05    140  |  106  |  23.0<H>  ----------------------------<  196<H>  3.5   |  23.0  |  0.66    Ca    7.3<L>      05 Aug 2019 07:03  Phos  2.8     08-05  Mg     1.3     08-05    TPro  6.2<L>  /  Alb  1.9<L>  /  TBili  0.4  /  DBili  x   /  AST  27  /  ALT  18  /  AlkPhos  774<H>  08-05    CAPILLARY BLOOD GLUCOSE  POCT Blood Glucose.: 224 mg/dL (06 Aug 2019 07:42)  POCT Blood Glucose.: 268 mg/dL (05 Aug 2019 16:24)  POCT Blood Glucose.: 271 mg/dL (05 Aug 2019 11:32)    MEDICATIONS  (STANDING):  ascorbic acid 500 milliGRAM(s) Oral daily  dextrose 5%. 1000 milliLiter(s) (50 mL/Hr) IV Continuous <Continuous>  dextrose 50% Injectable 12.5 Gram(s) IV Push once  dextrose 50% Injectable 25 Gram(s) IV Push once  dextrose 50% Injectable 25 Gram(s) IV Push once  enoxaparin Injectable 40 milliGRAM(s) SubCutaneous daily  ferrous    sulfate 325 milliGRAM(s) Oral four times a day  folic acid 1 milliGRAM(s) Oral daily  gabapentin 300 milliGRAM(s) Oral two times a day  insulin lispro (HumaLOG) corrective regimen sliding scale   SubCutaneous three times a day before meals  metoprolol tartrate 25 milliGRAM(s) Oral two times a day  multivitamin 1 Tablet(s) Oral daily  pantoprazole    Tablet 40 milliGRAM(s) Oral before breakfast  piperacillin/tazobactam IVPB.. 3.375 Gram(s) IV Intermittent every 8 hours  saccharomyces boulardii 250 milliGRAM(s) Oral two times a day  silver sulfADIAZINE 1% Cream 1 Application(s) Topical daily  thiamine 100 milliGRAM(s) Oral daily  vancomycin  IVPB 750 milliGRAM(s) IV Intermittent every 12 hours    MEDICATIONS  (PRN):  aluminum hydroxide/magnesium hydroxide/simethicone Suspension 30 milliLiter(s) Oral every 6 hours PRN Dyspepsia  dextrose 40% Gel 15 Gram(s) Oral once PRN Blood Glucose LESS THAN 70 milliGRAM(s)/deciliter  glucagon  Injectable 1 milliGRAM(s) IntraMuscular once PRN Glucose LESS THAN 70 milligrams/deciliter  HYDROmorphone   Tablet 4 milliGRAM(s) Oral every 3 hours PRN Moderate Pain (4 - 6)  HYDROmorphone   Tablet 6 milliGRAM(s) Oral every 6 hours PRN Severe Pain (7 - 10)  HYDROmorphone  Injectable 1 milliGRAM(s) SubCutaneous every 6 hours PRN Severe Pain persisting 1 hour after oral opioids  ketorolac 15 milliGRAM(s) Oral every 8 hours PRN Moderate Pain (4 - 6)

## 2019-08-06 NOTE — PROGRESS NOTE ADULT - SUBJECTIVE AND OBJECTIVE BOX
Patient is a 54y old  Male who presents with a chief complaint of sepsis, foot wound (06 Aug 2019 12:22)  He is s/p Right BKA on 08/01/19. He was changed to oral Dilaudid on 08/03/19, with good relief of his symptoms. Utilized 6mg x 3 doses in last 24 hrs. He has planned right AKA on 08/07/19.    VERBAL REPORT: "The medicines are helping."  Patient presently reports adequate analgesia. He verbalizes concern about pain following AKA.  PAIN SCORE: 3-4/10 at rest  (VNRS)    MEDICATIONS  (STANDING):  ascorbic acid 500 milliGRAM(s) Oral daily  dextrose 5%. 1000 milliLiter(s) (50 mL/Hr) IV Continuous <Continuous>  dextrose 50% Injectable 12.5 Gram(s) IV Push once  dextrose 50% Injectable 25 Gram(s) IV Push once  dextrose 50% Injectable 25 Gram(s) IV Push once  enoxaparin Injectable 40 milliGRAM(s) SubCutaneous daily  ferrous    sulfate 325 milliGRAM(s) Oral four times a day  folic acid 1 milliGRAM(s) Oral daily  gabapentin 300 milliGRAM(s) Oral two times a day  insulin lispro (HumaLOG) corrective regimen sliding scale   SubCutaneous three times a day before meals  metoprolol tartrate 25 milliGRAM(s) Oral two times a day  multivitamin 1 Tablet(s) Oral daily  pantoprazole    Tablet 40 milliGRAM(s) Oral before breakfast  piperacillin/tazobactam IVPB.. 3.375 Gram(s) IV Intermittent every 8 hours  potassium chloride    Tablet ER 40 milliEquivalent(s) Oral every 4 hours  saccharomyces boulardii 250 milliGRAM(s) Oral two times a day  silver sulfADIAZINE 1% Cream 1 Application(s) Topical daily  sodium chloride 0.9%. 1000 milliLiter(s) (75 mL/Hr) IV Continuous <Continuous>  thiamine 100 milliGRAM(s) Oral daily    MEDICATIONS  (PRN):  aluminum hydroxide/magnesium hydroxide/simethicone Suspension 30 milliLiter(s) Oral every 6 hours PRN Dyspepsia  dextrose 40% Gel 15 Gram(s) Oral once PRN Blood Glucose LESS THAN 70 milliGRAM(s)/deciliter  glucagon  Injectable 1 milliGRAM(s) IntraMuscular once PRN Glucose LESS THAN 70 milligrams/deciliter  HYDROmorphone   Tablet 4 milliGRAM(s) Oral every 3 hours PRN Moderate Pain (4 - 6)  HYDROmorphone   Tablet 6 milliGRAM(s) Oral every 6 hours PRN Severe Pain (7 - 10)  HYDROmorphone  Injectable 1 milliGRAM(s) SubCutaneous every 6 hours PRN Severe Pain persisting 1 hour after oral opioids  ketorolac 10 milliGRAM(s) Oral every 8 hours PRN Mild Pain (1 - 3)      PHYSICAL EXAM:  A&Ox3, NAD, sitting up in bed. No evidence of intense pain or oversedation.       T(F): , Max: 98.5 (15:56)  HR:  (107 - 127)  BP:  (150/91 - 165/99)  RR:  (18 - 20)  SpO2:  (95% - 97%)      Pain Service  Text page via [facility]  PIN: [phone]

## 2019-08-06 NOTE — PROGRESS NOTE ADULT - ASSESSMENT
54y old  Male who presents with a chief complaint of sepsis, foot wound S/P emergent R Guillotine BKA    - Wound vac placed. To remain in place until OR  - Maintain R BKA elevated   - Per Pain management suboxone should be restarted on discharge. Pt reports he sees Dr Pierce in [place] for his suboxone.   - Per ID recs: will continue IV Abx until post op AKA. Discussed operative options with pt and Dr Lozano. Pt with contracture or RLE and short BKA. For most appropraite wound healing and for best functional outcome R AKA was recommended. Pt is accepting of R AKA. Scheduled for OR Wednesday 8/7  -Medical optimization prior to AKA  -NPO p MN for OR tomorrow

## 2019-08-06 NOTE — PROGRESS NOTE ADULT - ASSESSMENT
55 y/o M, POD #5 Guillotine amputation below right knee. Hx: opioid and ETOH abuse, previously on Suboxone. Last picked up 04/17/19. He was found sitting up in bed, NAD, A&Ox3. Discussed continuation of current regimen, with return to Suboxone prior to facility discharge. He does not want to be weaned down on opioids before changing back to Suboxone. Advised that this might trigger withdrawal symptoms. Patient verbalizes understanding.

## 2019-08-06 NOTE — PROGRESS NOTE ADULT - SUBJECTIVE AND OBJECTIVE BOX
[facility]  INFECTIOUS DISEASES AND INTERNAL MEDICINE at [place]  =======================================================  Rupesh Santa MD  Diplomates American Board of Internal Medicine and Infectious Diseases  Tel: [phone]      Fax: [phone]  =======================================================    BREANNA MENDOZA [id number]    Follow up: maggot infestation of wound; RIGHT BKA stump infection  s/p RIGHT BKA 8/1/19  no new issues  vascular note appreciated.   no MRSA on nasal swab    Allergies:  Allergy Status Unknown    Antibiotics:  piperacillin/tazobactam IVPB.. 3.375 Gram(s) IV Intermittent every 8 hours      REVIEW OF SYSTEMS:  as above    Physical Exam:  T(F): 98.2 (06 Aug 2019 07:50), Max: 98.5 (05 Aug 2019 15:56)  HR: 107 (06 Aug 2019 07:50)  BP: 158/91 (06 Aug 2019 07:50)  RR: 19 (06 Aug 2019 07:50)  SpO2: 97% (06 Aug 2019 07:50) (95% - 97%)  temp max in last 48H T(F): , Max: 99.6 (08-04-19 @ 15:47)    GEN: NAD, pleasant  HEENT: normocephalic and atraumatic. EOMI. PERRL.  Anicteric   NECK: Supple.   LUNGS: Clear to auscultation.  HEART: Regular rate and rhythm  ABDOMEN: Soft, nontender, and nondistended.  Positive bowel sounds.    : No CVA tenderness  EXTREMITIES: Without any edema.  MSK: no joint swelling  NEUROLOGIC: No focal deficits  PSYCHIATRIC: Appropriate affect .  SKIN: RLE BKA stump with wound vac; edges of skin appears clean an intact; left TMA stump with dressing in place    ===========  Labs:                        7.7    6.99  )-----------( 322      ( 06 Aug 2019 07:59 )             26.9       WBC Count: 6.99 K/uL (08-06-19 @ 07:59)  WBC Count: 7.72 K/uL (08-05-19 @ 07:03)  WBC Count: 8.09 K/uL (08-04-19 @ 08:30)  WBC Count: 9.26 K/uL (08-03-19 @ 12:31)  WBC Count: 9.55 K/uL (08-02-19 @ 07:43)  WBC Count: 16.25 K/uL (08-01-19 @ 16:47)      08-06    138  |  105  |  18.0  ----------------------------<  224<H>  3.1<L>   |  24.0  |  0.84    Ca    7.3<L>      06 Aug 2019 07:59  Phos  2.8     08-05  Mg     1.3     08-05    TPro  6.2<L>  /  Alb  1.9<L>  /  TBili  0.4  /  DBili  x   /  AST  27  /  ALT  18  /  AlkPhos  774<H>  08-05      Culture - Urine (collected 08-01-19 @ 05:26)  Source: .Urine  Final Report (08-02-19 @ 10:09):    No growth    Culture - Blood (collected 07-31-19 @ 19:34)  Source: .Blood  Final Report (08-05-19 @ 21:00):    No growth at 5 days.    Culture - Blood (collected 07-31-19 @ 19:34)  Source: .Blood  Final Report (08-05-19 @ 21:00):    No growth at 5 days.

## 2019-08-06 NOTE — PROGRESS NOTE ADULT - ASSESSMENT
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 Pt admitted with severe sepsis secondary to wet gangrene of RLE, complicated by maggot infestation of wound. Vascular sx consulted and pt s/p wound cleansing and went to the OR for emergent source control and s/p  Guillotine R BKA. Post operatively day #1 remains stable. Sepsis resolved post sx. Empirically on vanco/ zosyn, Bcx negative. Hospital course complicated by acute on chronic anemia in the setting of AOCD, s/p 2 u prbc on admission and 1 u prbc intraop with appropriate response to hgb, remains stable thereafter. FOBT pending but no evidence of active bleeding. Also noted with BRANT/ hyponatremia likely due to dehydration/sepsis, s/p ivf fluid resuscitation with improving renal function and sodium. Slow clinical improvement. ID, Podiatry, Vascular, PM and Psych consults noted.     Severe sepsis 2/2 wet gangrene of the RLE with maggot infestation  - s/p emergent source control and s/p Guillotine BKA POD #5  - no intra op complications   - currently with down trending leukocytosis to wnl   - elevated esr/ crp   - afebrile  and normotensive - B cx negative X 2  - c/w Zosyn 3.375 g IV q8h D#6   - d/c Vanco (MRSA negative)   - pain control as per pain mx, no role for suboxone until off of acute narcotics   - c/w  Dilaudid 4mg PO q4hrs PRN moderate pain  - c/w Dilaudid 6mg PO q4hrs PRN severe pain   - c/w Dilaudid 1mg subcutaneous q6hrs PRN severe pain persisting 1 hour after oral opioids  - c/w Robaxin 500mg PO q6hrs ATC  - c/w toradol 15 mg IV q8hrs prn   - pt will not receive IV narcotics only sq given prior abuse   - c/w colace for prn constipation   - s/p tran post op   - c/w local wound care as per vascular sx   - Monitor BM - if worsening, will check for c.diff  - ID f/u noted and appreciated, d/w Dr. Santa the plan of care. Pt will c/w abx until 4 days s/p BKA revision which is scheduled for 8/7  - vascular sx f/u  noted and appreciated. RTOR on 8/7 for possible revision vs wound vac placement and future skin grafting. C/w elevation and immobilizer to prevent contracture.   - Podiatry consult noted and appreciated   - Pain mx consult noted, avoid narcotics in IV form   - Psych consult noted and appreciated, will re consult due to concerns for depression and also when ready to transition pt to suboxone     Sinus tachycardia  - Pt denies CP, SOB. Repeat EKG done today sinus tachycardia. Unchanged from ekg on admission and 8/3  - On admission - sinus tachycardia w/ T wave inversion in leads V4-6 now with controlled HR initially likely due to sepsis   - admission Trop neg X2   - no evidence of any issues on tele so discontinued   - repeat Troponin 8/4 x 1 - negative  - TTE: Normal LVSF, EF 65-70; No right heart strain; Mild Pul HTN; mod enlarged LA  - started on low dose bb   - Pending CTA chest w/ IV contrast - pt high risk for PE. Patient informed about risk vs benefits of the test, explained the clinical utility in the test. pt expressed verbalized understanding. Despite explaining multiple times by nursing and medical team, pt states intermittently that nothing was explained to him.   - cardiology was consulted on admission but has not been re-consulted    Hypokalemia  - most likely due to acute GI loss  - monitor I&O; monitor for BM  - replenish K 40meq x 3 dose  - f/u repeat BMP tomorrow    BRANT - resolved  - likely secondary to dehydration with associated hyponatremia   -Improving renal fxn to wnl  -improving sodium to 134  - toleraing PO intake, D/c IVF  - monitor renal function post IV contrast   - will c/w monitoring renal fxn and sodium closely     B/l groin intertriginous dermatitis  -C/w Nystatin powder bid  -Continue to monitor for signs of infection     Acute on Chronic Anemia   - noted AOCD   - h/h remains stable   -Initial Hb 8.3, downtrended to 6.4   - likely bc pt was initially dehydrated   -s/p 2U PRBCs on admision and 1 u prbc intra op   - appropriate response to prbc transfusion   - FOBT pending, no evidence of bleeding   -Iron panel suggest AOCD   - will monitor hgb and transfuse for hgb <7   -C/w iron supplement, - c/w vitamin c     IDDM-2 poorly controlled  -HbA1c 7  - fs stable  - c/w accuchecks ACHS TID  - c/w HSS  - c/w hypoglycemia protocol     HLD  -C/W LIPITOR PO QHS     H/o polysubstance use disorder - currently taking Suboxone  -Utox negative  -Last dose of Suboxone was day prior to hospitalization   -currently pt requiring narcotics for pain control   - labile emotions - psych reconsult for signs of depression  -Psych consult noted and appreciated, will reconsult when pt is off narcotics or defer to outpt management     Preventative Measures  DVT ppx: started lovenox 40 - benefits outweigh risk. s/p BKA, immobile, sedentary, platelets stable, renal functions wnl.    Advanced Directive: Full code  Next of kin: Brother Jose    Dispo: Pt s/p Right BKA, plan for revision of bka 8/7, patient continues to be tachy, CTA ordered  to r/o PE. Pending psych consult for possible depression.

## 2019-08-07 ENCOUNTER — TRANSCRIPTION ENCOUNTER (OUTPATIENT)
Age: 54
End: 2019-08-07

## 2019-08-07 ENCOUNTER — RESULT REVIEW (OUTPATIENT)
Age: 54
End: 2019-08-07

## 2019-08-07 DIAGNOSIS — M79.2 NEURALGIA AND NEURITIS, UNSPECIFIED: ICD-10-CM

## 2019-08-07 LAB
ANION GAP SERPL CALC-SCNC: 8 MMOL/L — SIGNIFICANT CHANGE UP (ref 5–17)
BASE EXCESS BLDV CALC-SCNC: -0.7 MMOL/L — SIGNIFICANT CHANGE UP (ref -2–2)
BASOPHILS # BLD AUTO: 0.08 K/UL — SIGNIFICANT CHANGE UP (ref 0–0.2)
BASOPHILS NFR BLD AUTO: 1.3 % — SIGNIFICANT CHANGE UP (ref 0–2)
BUN SERPL-MCNC: 16 MG/DL — SIGNIFICANT CHANGE UP (ref 8–20)
CA-I SERPL-SCNC: 1.13 MMOL/L — LOW (ref 1.15–1.33)
CALCIUM SERPL-MCNC: 7.7 MG/DL — LOW (ref 8.6–10.2)
CHLORIDE BLDV-SCNC: 112 MMOL/L — HIGH (ref 98–107)
CHLORIDE SERPL-SCNC: 107 MMOL/L — SIGNIFICANT CHANGE UP (ref 98–107)
CO2 SERPL-SCNC: 23 MMOL/L — SIGNIFICANT CHANGE UP (ref 22–29)
CREAT SERPL-MCNC: 0.64 MG/DL — SIGNIFICANT CHANGE UP (ref 0.5–1.3)
EOSINOPHIL # BLD AUTO: 0.32 K/UL — SIGNIFICANT CHANGE UP (ref 0–0.5)
EOSINOPHIL NFR BLD AUTO: 5 % — SIGNIFICANT CHANGE UP (ref 0–6)
GAS PNL BLDV: 143 MMOL/L — SIGNIFICANT CHANGE UP (ref 135–145)
GAS PNL BLDV: SIGNIFICANT CHANGE UP
GAS PNL BLDV: SIGNIFICANT CHANGE UP
GLUCOSE BLDC GLUCOMTR-MCNC: 174 MG/DL — HIGH (ref 70–99)
GLUCOSE BLDC GLUCOMTR-MCNC: 221 MG/DL — HIGH (ref 70–99)
GLUCOSE BLDC GLUCOMTR-MCNC: 330 MG/DL — HIGH (ref 70–99)
GLUCOSE BLDC GLUCOMTR-MCNC: 351 MG/DL — HIGH (ref 70–99)
GLUCOSE BLDV-MCNC: 174 MG/DL — HIGH (ref 70–99)
GLUCOSE SERPL-MCNC: 170 MG/DL — HIGH (ref 70–115)
HCO3 BLDV-SCNC: 24 MMOL/L — SIGNIFICANT CHANGE UP (ref 20–26)
HCT VFR BLD CALC: 25.9 % — LOW (ref 39–50)
HCT VFR BLDA CALC: 22 — LOW (ref 39–50)
HGB BLD CALC-MCNC: 7.3 G/DL — LOW (ref 13–17)
HGB BLD-MCNC: 7.4 G/DL — LOW (ref 13–17)
IMM GRANULOCYTES NFR BLD AUTO: 1.4 % — SIGNIFICANT CHANGE UP (ref 0–1.5)
LACTATE BLDV-MCNC: 1.2 MMOL/L — SIGNIFICANT CHANGE UP (ref 0.5–2)
LYMPHOCYTES # BLD AUTO: 1.55 K/UL — SIGNIFICANT CHANGE UP (ref 1–3.3)
LYMPHOCYTES # BLD AUTO: 24.3 % — SIGNIFICANT CHANGE UP (ref 13–44)
MAGNESIUM SERPL-MCNC: 1.7 MG/DL — SIGNIFICANT CHANGE UP (ref 1.6–2.6)
MCHC RBC-ENTMCNC: 25.9 PG — LOW (ref 27–34)
MCHC RBC-ENTMCNC: 28.6 GM/DL — LOW (ref 32–36)
MCV RBC AUTO: 90.6 FL — SIGNIFICANT CHANGE UP (ref 80–100)
MONOCYTES # BLD AUTO: 0.58 K/UL — SIGNIFICANT CHANGE UP (ref 0–0.9)
MONOCYTES NFR BLD AUTO: 9.1 % — SIGNIFICANT CHANGE UP (ref 2–14)
NEUTROPHILS # BLD AUTO: 3.77 K/UL — SIGNIFICANT CHANGE UP (ref 1.8–7.4)
NEUTROPHILS NFR BLD AUTO: 58.9 % — SIGNIFICANT CHANGE UP (ref 43–77)
OTHER CELLS CSF MANUAL: 11 ML/DL — LOW (ref 18–22)
PCO2 BLDV: 41 MMHG — SIGNIFICANT CHANGE UP (ref 35–50)
PH BLDV: 7.38 — SIGNIFICANT CHANGE UP (ref 7.32–7.43)
PHOSPHATE SERPL-MCNC: 2.5 MG/DL — SIGNIFICANT CHANGE UP (ref 2.4–4.7)
PLATELET # BLD AUTO: 361 K/UL — SIGNIFICANT CHANGE UP (ref 150–400)
PO2 BLDV: 360 MMHG — HIGH (ref 25–45)
POTASSIUM BLDV-SCNC: 3.8 MMOL/L — SIGNIFICANT CHANGE UP (ref 3.4–4.5)
POTASSIUM SERPL-MCNC: 4.1 MMOL/L — SIGNIFICANT CHANGE UP (ref 3.5–5.3)
POTASSIUM SERPL-SCNC: 4.1 MMOL/L — SIGNIFICANT CHANGE UP (ref 3.5–5.3)
RBC # BLD: 2.86 M/UL — LOW (ref 4.2–5.8)
RBC # FLD: 16.9 % — HIGH (ref 10.3–14.5)
SAO2 % BLDV: 100 % — SIGNIFICANT CHANGE UP
SODIUM SERPL-SCNC: 138 MMOL/L — SIGNIFICANT CHANGE UP (ref 135–145)
WBC # BLD: 6.39 K/UL — SIGNIFICANT CHANGE UP (ref 3.8–10.5)
WBC # FLD AUTO: 6.39 K/UL — SIGNIFICANT CHANGE UP (ref 3.8–10.5)

## 2019-08-07 PROCEDURE — 73630 X-RAY EXAM OF FOOT: CPT | Mod: 26,LT

## 2019-08-07 PROCEDURE — 99233 SBSQ HOSP IP/OBS HIGH 50: CPT | Mod: GC

## 2019-08-07 PROCEDURE — 88311 DECALCIFY TISSUE: CPT | Mod: 26

## 2019-08-07 PROCEDURE — 27590 AMPUTATE LEG AT THIGH: CPT | Mod: RT,58

## 2019-08-07 PROCEDURE — 88307 TISSUE EXAM BY PATHOLOGIST: CPT | Mod: 26

## 2019-08-07 PROCEDURE — 99232 SBSQ HOSP IP/OBS MODERATE 35: CPT

## 2019-08-07 PROCEDURE — 27590 AMPUTATE LEG AT THIGH: CPT | Mod: AS,RT,58

## 2019-08-07 RX ORDER — FENTANYL CITRATE 50 UG/ML
50 INJECTION INTRAVENOUS
Refills: 0 | Status: DISCONTINUED | OUTPATIENT
Start: 2019-08-07 | End: 2019-08-07

## 2019-08-07 RX ORDER — ZOLPIDEM TARTRATE 10 MG/1
5 TABLET ORAL AT BEDTIME
Refills: 0 | Status: DISCONTINUED | OUTPATIENT
Start: 2019-08-07 | End: 2019-08-13

## 2019-08-07 RX ORDER — OXYCODONE HYDROCHLORIDE 5 MG/1
10 TABLET ORAL ONCE
Refills: 0 | Status: DISCONTINUED | OUTPATIENT
Start: 2019-08-07 | End: 2019-08-07

## 2019-08-07 RX ORDER — DEXTROSE 50 % IN WATER 50 %
25 SYRINGE (ML) INTRAVENOUS ONCE
Refills: 0 | Status: DISCONTINUED | OUTPATIENT
Start: 2019-08-07 | End: 2019-08-13

## 2019-08-07 RX ORDER — METOPROLOL TARTRATE 50 MG
25 TABLET ORAL
Refills: 0 | Status: DISCONTINUED | OUTPATIENT
Start: 2019-08-07 | End: 2019-08-08

## 2019-08-07 RX ORDER — DEXTROSE 50 % IN WATER 50 %
12.5 SYRINGE (ML) INTRAVENOUS ONCE
Refills: 0 | Status: DISCONTINUED | OUTPATIENT
Start: 2019-08-07 | End: 2019-08-13

## 2019-08-07 RX ORDER — GABAPENTIN 400 MG/1
300 CAPSULE ORAL
Refills: 0 | Status: DISCONTINUED | OUTPATIENT
Start: 2019-08-07 | End: 2019-08-07

## 2019-08-07 RX ORDER — THIAMINE MONONITRATE (VIT B1) 100 MG
100 TABLET ORAL DAILY
Refills: 0 | Status: DISCONTINUED | OUTPATIENT
Start: 2019-08-07 | End: 2019-08-07

## 2019-08-07 RX ORDER — OXYCODONE HYDROCHLORIDE 5 MG/1
10 TABLET ORAL
Refills: 0 | Status: DISCONTINUED | OUTPATIENT
Start: 2019-08-07 | End: 2019-08-07

## 2019-08-07 RX ORDER — VANCOMYCIN HCL 1 G
1000 VIAL (EA) INTRAVENOUS EVERY 24 HOURS
Refills: 0 | Status: DISCONTINUED | OUTPATIENT
Start: 2019-08-07 | End: 2019-08-07

## 2019-08-07 RX ORDER — SODIUM CHLORIDE 9 MG/ML
1000 INJECTION, SOLUTION INTRAVENOUS
Refills: 0 | Status: DISCONTINUED | OUTPATIENT
Start: 2019-08-07 | End: 2019-08-07

## 2019-08-07 RX ORDER — HYDROMORPHONE HYDROCHLORIDE 2 MG/ML
1 INJECTION INTRAMUSCULAR; INTRAVENOUS; SUBCUTANEOUS
Refills: 0 | Status: DISCONTINUED | OUTPATIENT
Start: 2019-08-07 | End: 2019-08-07

## 2019-08-07 RX ORDER — ACETAMINOPHEN 500 MG
975 TABLET ORAL EVERY 8 HOURS
Refills: 0 | Status: DISCONTINUED | OUTPATIENT
Start: 2019-08-07 | End: 2019-08-13

## 2019-08-07 RX ORDER — DULOXETINE HYDROCHLORIDE 30 MG/1
20 CAPSULE, DELAYED RELEASE ORAL
Refills: 0 | Status: DISCONTINUED | OUTPATIENT
Start: 2019-08-07 | End: 2019-08-08

## 2019-08-07 RX ORDER — HYDROMORPHONE HYDROCHLORIDE 2 MG/ML
1 INJECTION INTRAMUSCULAR; INTRAVENOUS; SUBCUTANEOUS
Refills: 0 | Status: DISCONTINUED | OUTPATIENT
Start: 2019-08-07 | End: 2019-08-09

## 2019-08-07 RX ORDER — ATORVASTATIN CALCIUM 80 MG/1
40 TABLET, FILM COATED ORAL AT BEDTIME
Refills: 0 | Status: DISCONTINUED | OUTPATIENT
Start: 2019-08-07 | End: 2019-08-13

## 2019-08-07 RX ORDER — FOLIC ACID 0.8 MG
1 TABLET ORAL DAILY
Refills: 0 | Status: DISCONTINUED | OUTPATIENT
Start: 2019-08-07 | End: 2019-08-13

## 2019-08-07 RX ORDER — ENOXAPARIN SODIUM 100 MG/ML
40 INJECTION SUBCUTANEOUS DAILY
Refills: 0 | Status: DISCONTINUED | OUTPATIENT
Start: 2019-08-07 | End: 2019-08-13

## 2019-08-07 RX ORDER — ONDANSETRON 8 MG/1
4 TABLET, FILM COATED ORAL ONCE
Refills: 0 | Status: DISCONTINUED | OUTPATIENT
Start: 2019-08-07 | End: 2019-08-07

## 2019-08-07 RX ORDER — METHOCARBAMOL 500 MG/1
750 TABLET, FILM COATED ORAL EVERY 8 HOURS
Refills: 0 | Status: DISCONTINUED | OUTPATIENT
Start: 2019-08-07 | End: 2019-08-13

## 2019-08-07 RX ORDER — PANTOPRAZOLE SODIUM 20 MG/1
40 TABLET, DELAYED RELEASE ORAL
Refills: 0 | Status: DISCONTINUED | OUTPATIENT
Start: 2019-08-07 | End: 2019-08-13

## 2019-08-07 RX ORDER — FERROUS SULFATE 325(65) MG
325 TABLET ORAL
Refills: 0 | Status: DISCONTINUED | OUTPATIENT
Start: 2019-08-07 | End: 2019-08-13

## 2019-08-07 RX ORDER — ASCORBIC ACID 60 MG
500 TABLET,CHEWABLE ORAL DAILY
Refills: 0 | Status: DISCONTINUED | OUTPATIENT
Start: 2019-08-07 | End: 2019-08-13

## 2019-08-07 RX ORDER — OXYCODONE HYDROCHLORIDE 5 MG/1
5 TABLET ORAL
Refills: 0 | Status: DISCONTINUED | OUTPATIENT
Start: 2019-08-07 | End: 2019-08-07

## 2019-08-07 RX ORDER — FENTANYL CITRATE 50 UG/ML
25 INJECTION INTRAVENOUS
Refills: 0 | Status: DISCONTINUED | OUTPATIENT
Start: 2019-08-07 | End: 2019-08-07

## 2019-08-07 RX ORDER — HYDROMORPHONE HYDROCHLORIDE 2 MG/ML
30 INJECTION INTRAMUSCULAR; INTRAVENOUS; SUBCUTANEOUS
Refills: 0 | Status: DISCONTINUED | OUTPATIENT
Start: 2019-08-07 | End: 2019-08-07

## 2019-08-07 RX ORDER — OXYCODONE HYDROCHLORIDE 5 MG/1
10 TABLET ORAL
Refills: 0 | Status: DISCONTINUED | OUTPATIENT
Start: 2019-08-07 | End: 2019-08-08

## 2019-08-07 RX ORDER — OXYCODONE HYDROCHLORIDE 5 MG/1
5 TABLET ORAL
Refills: 0 | Status: DISCONTINUED | OUTPATIENT
Start: 2019-08-07 | End: 2019-08-08

## 2019-08-07 RX ORDER — SODIUM CHLORIDE 9 MG/ML
1000 INJECTION, SOLUTION INTRAVENOUS
Refills: 0 | Status: DISCONTINUED | OUTPATIENT
Start: 2019-08-07 | End: 2019-08-13

## 2019-08-07 RX ORDER — PIPERACILLIN AND TAZOBACTAM 4; .5 G/20ML; G/20ML
3.38 INJECTION, POWDER, LYOPHILIZED, FOR SOLUTION INTRAVENOUS EVERY 8 HOURS
Refills: 0 | Status: COMPLETED | OUTPATIENT
Start: 2019-08-07 | End: 2019-08-11

## 2019-08-07 RX ORDER — MAGNESIUM SULFATE 500 MG/ML
4 VIAL (ML) INJECTION ONCE
Refills: 0 | Status: COMPLETED | OUTPATIENT
Start: 2019-08-07 | End: 2019-08-07

## 2019-08-07 RX ORDER — INSULIN LISPRO 100/ML
VIAL (ML) SUBCUTANEOUS
Refills: 0 | Status: DISCONTINUED | OUTPATIENT
Start: 2019-08-07 | End: 2019-08-13

## 2019-08-07 RX ORDER — THIAMINE MONONITRATE (VIT B1) 100 MG
100 TABLET ORAL DAILY
Refills: 0 | Status: DISCONTINUED | OUTPATIENT
Start: 2019-08-07 | End: 2019-08-13

## 2019-08-07 RX ORDER — GABAPENTIN 400 MG/1
300 CAPSULE ORAL THREE TIMES A DAY
Refills: 0 | Status: DISCONTINUED | OUTPATIENT
Start: 2019-08-07 | End: 2019-08-12

## 2019-08-07 RX ORDER — DEXTROSE 50 % IN WATER 50 %
15 SYRINGE (ML) INTRAVENOUS ONCE
Refills: 0 | Status: DISCONTINUED | OUTPATIENT
Start: 2019-08-07 | End: 2019-08-13

## 2019-08-07 RX ORDER — SACCHAROMYCES BOULARDII 250 MG
250 POWDER IN PACKET (EA) ORAL
Refills: 0 | Status: DISCONTINUED | OUTPATIENT
Start: 2019-08-07 | End: 2019-08-13

## 2019-08-07 RX ORDER — GLUCAGON INJECTION, SOLUTION 0.5 MG/.1ML
1 INJECTION, SOLUTION SUBCUTANEOUS ONCE
Refills: 0 | Status: DISCONTINUED | OUTPATIENT
Start: 2019-08-07 | End: 2019-08-13

## 2019-08-07 RX ADMIN — METHOCARBAMOL 750 MILLIGRAM(S): 500 TABLET, FILM COATED ORAL at 12:42

## 2019-08-07 RX ADMIN — PIPERACILLIN AND TAZOBACTAM 25 GRAM(S): 4; .5 INJECTION, POWDER, LYOPHILIZED, FOR SOLUTION INTRAVENOUS at 21:15

## 2019-08-07 RX ADMIN — METHOCARBAMOL 750 MILLIGRAM(S): 500 TABLET, FILM COATED ORAL at 21:15

## 2019-08-07 RX ADMIN — FENTANYL CITRATE 50 MICROGRAM(S): 50 INJECTION INTRAVENOUS at 11:12

## 2019-08-07 RX ADMIN — Medication 325 MILLIGRAM(S): at 12:43

## 2019-08-07 RX ADMIN — Medication 975 MILLIGRAM(S): at 13:44

## 2019-08-07 RX ADMIN — Medication 4: at 16:25

## 2019-08-07 RX ADMIN — Medication 100 GRAM(S): at 06:05

## 2019-08-07 RX ADMIN — ATORVASTATIN CALCIUM 40 MILLIGRAM(S): 80 TABLET, FILM COATED ORAL at 21:15

## 2019-08-07 RX ADMIN — Medication 25 MILLIGRAM(S): at 06:17

## 2019-08-07 RX ADMIN — Medication 975 MILLIGRAM(S): at 21:14

## 2019-08-07 RX ADMIN — METHOCARBAMOL 750 MILLIGRAM(S): 500 TABLET, FILM COATED ORAL at 16:25

## 2019-08-07 RX ADMIN — GABAPENTIN 300 MILLIGRAM(S): 400 CAPSULE ORAL at 21:15

## 2019-08-07 RX ADMIN — Medication 25 MILLIGRAM(S): at 17:27

## 2019-08-07 RX ADMIN — Medication 975 MILLIGRAM(S): at 12:44

## 2019-08-07 RX ADMIN — Medication 975 MILLIGRAM(S): at 22:14

## 2019-08-07 RX ADMIN — Medication 1 MILLIGRAM(S): at 12:43

## 2019-08-07 RX ADMIN — Medication 25 MILLIGRAM(S): at 12:43

## 2019-08-07 RX ADMIN — DULOXETINE HYDROCHLORIDE 20 MILLIGRAM(S): 30 CAPSULE, DELAYED RELEASE ORAL at 20:43

## 2019-08-07 RX ADMIN — HYDROMORPHONE HYDROCHLORIDE 1 MILLIGRAM(S): 2 INJECTION INTRAMUSCULAR; INTRAVENOUS; SUBCUTANEOUS at 20:05

## 2019-08-07 RX ADMIN — PANTOPRAZOLE SODIUM 40 MILLIGRAM(S): 20 TABLET, DELAYED RELEASE ORAL at 06:17

## 2019-08-07 RX ADMIN — Medication 975 MILLIGRAM(S): at 17:30

## 2019-08-07 RX ADMIN — Medication 500 MILLIGRAM(S): at 12:43

## 2019-08-07 RX ADMIN — Medication 1: at 06:22

## 2019-08-07 RX ADMIN — GABAPENTIN 300 MILLIGRAM(S): 400 CAPSULE ORAL at 16:25

## 2019-08-07 RX ADMIN — Medication 2: at 13:01

## 2019-08-07 RX ADMIN — OXYCODONE HYDROCHLORIDE 10 MILLIGRAM(S): 5 TABLET ORAL at 12:44

## 2019-08-07 RX ADMIN — FENTANYL CITRATE 50 MICROGRAM(S): 50 INJECTION INTRAVENOUS at 11:02

## 2019-08-07 RX ADMIN — SODIUM CHLORIDE 75 MILLILITER(S): 9 INJECTION INTRAMUSCULAR; INTRAVENOUS; SUBCUTANEOUS at 00:13

## 2019-08-07 RX ADMIN — Medication 1 APPLICATION(S): at 12:44

## 2019-08-07 RX ADMIN — PIPERACILLIN AND TAZOBACTAM 25 GRAM(S): 4; .5 INJECTION, POWDER, LYOPHILIZED, FOR SOLUTION INTRAVENOUS at 16:25

## 2019-08-07 RX ADMIN — Medication 325 MILLIGRAM(S): at 17:27

## 2019-08-07 RX ADMIN — Medication 1 TABLET(S): at 12:43

## 2019-08-07 RX ADMIN — HYDROMORPHONE HYDROCHLORIDE 1 MILLIGRAM(S): 2 INJECTION INTRAMUSCULAR; INTRAVENOUS; SUBCUTANEOUS at 22:49

## 2019-08-07 RX ADMIN — PANTOPRAZOLE SODIUM 40 MILLIGRAM(S): 20 TABLET, DELAYED RELEASE ORAL at 12:43

## 2019-08-07 RX ADMIN — GABAPENTIN 300 MILLIGRAM(S): 400 CAPSULE ORAL at 06:15

## 2019-08-07 RX ADMIN — OXYCODONE HYDROCHLORIDE 10 MILLIGRAM(S): 5 TABLET ORAL at 13:44

## 2019-08-07 RX ADMIN — Medication 250 MILLIGRAM(S): at 17:27

## 2019-08-07 RX ADMIN — ENOXAPARIN SODIUM 40 MILLIGRAM(S): 100 INJECTION SUBCUTANEOUS at 12:43

## 2019-08-07 RX ADMIN — Medication 975 MILLIGRAM(S): at 16:24

## 2019-08-07 RX ADMIN — FENTANYL CITRATE 50 MICROGRAM(S): 50 INJECTION INTRAVENOUS at 11:38

## 2019-08-07 RX ADMIN — HYDROMORPHONE HYDROCHLORIDE 1 MILLIGRAM(S): 2 INJECTION INTRAMUSCULAR; INTRAVENOUS; SUBCUTANEOUS at 19:49

## 2019-08-07 RX ADMIN — PIPERACILLIN AND TAZOBACTAM 25 GRAM(S): 4; .5 INJECTION, POWDER, LYOPHILIZED, FOR SOLUTION INTRAVENOUS at 05:56

## 2019-08-07 RX ADMIN — Medication 100 MILLIGRAM(S): at 12:43

## 2019-08-07 NOTE — PROGRESS NOTE ADULT - SUBJECTIVE AND OBJECTIVE BOX
BREANNA MENDOZA    [id number]    History:      The patient is status post guillotine right BKA, no scheduled for AKA with closure.   The patient's pain is controlled using the prescribed pain medications.   Wound vac is the the stump and functioning.   Denies nausea, vomiting, chest pain, shortness of breath, abdominal pain or fever.   No new complaints. No acute motor or sensory changes are reported.    Vital Signs Last 24 Hrs  T(C): 37.1 (07 Aug 2019 00:35), Max: 37.1 (07 Aug 2019 00:35)  T(F): 98.8 (07 Aug 2019 00:35), Max: 98.8 (07 Aug 2019 00:35)  HR: 119 (07 Aug 2019 00:35) (107 - 119)  BP: 137/83 (07 Aug 2019 00:35) (132/89 - 158/91)  BP(mean): --  RR: 18 (07 Aug 2019 00:35) (18 - 19)  SpO2: 95% (07 Aug 2019 00:35) (95% - 97%)  I&O's Summary    06 Aug 2019 07:01  -  07 Aug 2019 07:00  --------------------------------------------------------  IN: 1450 mL / OUT: 1500 mL / NET: -50 mL                              7.7    6.99  )-----------( 322      ( 06 Aug 2019 07:59 )             26.9     08-06    136  |  104  |  17.0  ----------------------------<  297<H>  3.9   |  20.0<L>  |  0.84    Ca    7.6<L>      06 Aug 2019 22:11  Phos  2.5     08-06  Mg     1.3     08-06        MEDICATIONS  (STANDING):  acetaminophen   Tablet .. 975 milliGRAM(s) Oral every 8 hours  ascorbic acid 500 milliGRAM(s) Oral daily  dextrose 5%. 1000 milliLiter(s) (50 mL/Hr) IV Continuous <Continuous>  dextrose 50% Injectable 12.5 Gram(s) IV Push once  dextrose 50% Injectable 25 Gram(s) IV Push once  dextrose 50% Injectable 25 Gram(s) IV Push once  enoxaparin Injectable 40 milliGRAM(s) SubCutaneous daily  ferrous    sulfate 325 milliGRAM(s) Oral four times a day  folic acid 1 milliGRAM(s) Oral daily  gabapentin 300 milliGRAM(s) Oral two times a day  insulin lispro (HumaLOG) corrective regimen sliding scale   SubCutaneous three times a day before meals  methocarbamol 750 milliGRAM(s) Oral every 8 hours  metoprolol tartrate 25 milliGRAM(s) Oral two times a day  multivitamin 1 Tablet(s) Oral daily  pantoprazole    Tablet 40 milliGRAM(s) Oral before breakfast  piperacillin/tazobactam IVPB.. 3.375 Gram(s) IV Intermittent every 8 hours  saccharomyces boulardii 250 milliGRAM(s) Oral two times a day  silver sulfADIAZINE 1% Cream 1 Application(s) Topical daily  sodium chloride 0.9%. 1000 milliLiter(s) (75 mL/Hr) IV Continuous <Continuous>  thiamine 100 milliGRAM(s) Oral daily    MEDICATIONS  (PRN):  aluminum hydroxide/magnesium hydroxide/simethicone Suspension 30 milliLiter(s) Oral every 6 hours PRN Dyspepsia  dextrose 40% Gel 15 Gram(s) Oral once PRN Blood Glucose LESS THAN 70 milliGRAM(s)/deciliter  glucagon  Injectable 1 milliGRAM(s) IntraMuscular once PRN Glucose LESS THAN 70 milligrams/deciliter  HYDROmorphone   Tablet 4 milliGRAM(s) Oral every 4 hours PRN moderate pain/severe pain  ketorolac 10 milliGRAM(s) Oral every 8 hours PRN Mild Pain (1 - 3)      Physical exam:     No distress, flat affect.   non labored breathing  Abdomen is soft   Wound vac to right BKA stump, functioning.  Extremity is warm.          Assessment:  • s/p right high BKA guillotine for gross infection/gangrene/maggot infestation       •   Plan:   • Keep NPO for surgery today  - baseline anemia, plan to likely transfuse if the OR   - Continue ABX

## 2019-08-07 NOTE — PROGRESS NOTE ADULT - SUBJECTIVE AND OBJECTIVE BOX
BREANNA MENDOZA    [id number]    History:      The patient is status post Right AKA, with proximal resection of femoral nerve.   Patient is doing well. Reports some soreness to the operative site.   Denies nausea, vomiting, chest pain, shortness of breath, abdominal pain or fever.       Vital Signs Last 24 Hrs  T(C): 36.7 (07 Aug 2019 15:52), Max: 37.3 (07 Aug 2019 10:52)  T(F): 98.1 (07 Aug 2019 15:52), Max: 99.1 (07 Aug 2019 10:52)  HR: 107 (07 Aug 2019 15:52) (99 - 119)  BP: 154/97 (07 Aug 2019 15:52) (129/70 - 154/97)  BP(mean): --  RR: 18 (07 Aug 2019 15:52) (14 - 24)  SpO2: 92% (07 Aug 2019 15:52) (92% - 100%)  I&O's Summary    06 Aug 2019 07:01  -  07 Aug 2019 07:00  --------------------------------------------------------  IN: 1450 mL / OUT: 1500 mL / NET: -50 mL    07 Aug 2019 07:01  -  07 Aug 2019 17:21  --------------------------------------------------------  IN: 3545 mL / OUT: 50 mL / NET: 3495 mL                              7.4    6.39  )-----------( 361      ( 07 Aug 2019 07:29 )             25.9     08-07    138  |  107  |  16.0  ----------------------------<  170<H>  4.1   |  23.0  |  0.64    Ca    7.7<L>      07 Aug 2019 07:29  Phos  2.5     08-07  Mg     1.7     08-07        MEDICATIONS  (STANDING):  acetaminophen   Tablet .. 975 milliGRAM(s) Oral every 8 hours  ascorbic acid 500 milliGRAM(s) Oral daily  atorvastatin 40 milliGRAM(s) Oral at bedtime  dextrose 5%. 1000 milliLiter(s) (50 mL/Hr) IV Continuous <Continuous>  dextrose 50% Injectable 12.5 Gram(s) IV Push once  dextrose 50% Injectable 25 Gram(s) IV Push once  dextrose 50% Injectable 25 Gram(s) IV Push once  DULoxetine 20 milliGRAM(s) Oral two times a day  enoxaparin Injectable 40 milliGRAM(s) SubCutaneous daily  ferrous    sulfate 325 milliGRAM(s) Oral four times a day  folic acid 1 milliGRAM(s) Oral daily  gabapentin 300 milliGRAM(s) Oral three times a day  insulin lispro (HumaLOG) corrective regimen sliding scale   SubCutaneous three times a day before meals  methocarbamol 750 milliGRAM(s) Oral every 8 hours  metoprolol tartrate 25 milliGRAM(s) Oral two times a day  multivitamin 1 Tablet(s) Oral daily  pantoprazole    Tablet 40 milliGRAM(s) Oral before breakfast  piperacillin/tazobactam IVPB.. 3.375 Gram(s) IV Intermittent every 8 hours  saccharomyces boulardii 250 milliGRAM(s) Oral two times a day  silver sulfADIAZINE 1% Cream 1 Application(s) Topical daily  thiamine 100 milliGRAM(s) Oral daily    MEDICATIONS  (PRN):  aluminum hydroxide/magnesium hydroxide/simethicone Suspension 30 milliLiter(s) Oral every 6 hours PRN Dyspepsia  dextrose 40% Gel 15 Gram(s) Oral once PRN Blood Glucose LESS THAN 70 milliGRAM(s)/deciliter  glucagon  Injectable 1 milliGRAM(s) IntraMuscular once PRN Glucose LESS THAN 70 milligrams/deciliter  HYDROmorphone  Injectable 1 milliGRAM(s) IV Push every 3 hours PRN Severe Pain (7 - 10)  oxyCODONE    IR 5 milliGRAM(s) Oral every 3 hours PRN Mild Pain (1 - 3)  oxyCODONE    IR 10 milliGRAM(s) Oral every 3 hours PRN Moderate Pain (4 - 6)  zolpidem 5 milliGRAM(s) Oral at bedtime PRN Insomnia      Physical exam:   Alert and Oriented x3, family at bedside.  Non labored breathing   Right AKA with clean dressing, covered with iodoform.  Right thigh is soft and compressible.  Left tma with clean dressing in place.  Abdomen is soft, distended, non tender.       Assessment:  • S/p right AKA with proximal femoral nerve resection, to complete guillotine BKA    Patient currently stable    •   Plan:   • DVT prophylaxis as prescribed, including use of compression devices and ankle pumps to the left LE   •  physical therapy EVAL pending   •  Amputee support team to see in the AM  •  Elevate to Right stump on pillows   • Pain control as clinically indicated, disagree with PCA at this time, patient's pain can be adequately managed with prescribed regimen.    • Incentive spirometry encouraged  • Discharge planning – anticipated discharge is  subacute rehabilitation vs  acute rehabilitation POST OP CHECK         BREANNA MENDOZA            [id number]    History:      The patient is status post Right AKA, with proximal resection of femoral nerve.   Patient is doing well. Reports some soreness to the operative site.   Denies nausea, vomiting, chest pain, shortness of breath, abdominal pain or fever.       Vital Signs Last 24 Hrs  T(C): 36.7 (07 Aug 2019 15:52), Max: 37.3 (07 Aug 2019 10:52)  T(F): 98.1 (07 Aug 2019 15:52), Max: 99.1 (07 Aug 2019 10:52)  HR: 107 (07 Aug 2019 15:52) (99 - 119)  BP: 154/97 (07 Aug 2019 15:52) (129/70 - 154/97)  BP(mean): --  RR: 18 (07 Aug 2019 15:52) (14 - 24)  SpO2: 92% (07 Aug 2019 15:52) (92% - 100%)  I&O's Summary    06 Aug 2019 07:01  -  07 Aug 2019 07:00  --------------------------------------------------------  IN: 1450 mL / OUT: 1500 mL / NET: -50 mL    07 Aug 2019 07:01  -  07 Aug 2019 17:21  --------------------------------------------------------  IN: 3545 mL / OUT: 50 mL / NET: 3495 mL                              7.4    6.39  )-----------( 361      ( 07 Aug 2019 07:29 )             25.9     08-07    138  |  107  |  16.0  ----------------------------<  170<H>  4.1   |  23.0  |  0.64    Ca    7.7<L>      07 Aug 2019 07:29  Phos  2.5     08-07  Mg     1.7     08-07        MEDICATIONS  (STANDING):  acetaminophen   Tablet .. 975 milliGRAM(s) Oral every 8 hours  ascorbic acid 500 milliGRAM(s) Oral daily  atorvastatin 40 milliGRAM(s) Oral at bedtime  dextrose 5%. 1000 milliLiter(s) (50 mL/Hr) IV Continuous <Continuous>  dextrose 50% Injectable 12.5 Gram(s) IV Push once  dextrose 50% Injectable 25 Gram(s) IV Push once  dextrose 50% Injectable 25 Gram(s) IV Push once  DULoxetine 20 milliGRAM(s) Oral two times a day  enoxaparin Injectable 40 milliGRAM(s) SubCutaneous daily  ferrous    sulfate 325 milliGRAM(s) Oral four times a day  folic acid 1 milliGRAM(s) Oral daily  gabapentin 300 milliGRAM(s) Oral three times a day  insulin lispro (HumaLOG) corrective regimen sliding scale   SubCutaneous three times a day before meals  methocarbamol 750 milliGRAM(s) Oral every 8 hours  metoprolol tartrate 25 milliGRAM(s) Oral two times a day  multivitamin 1 Tablet(s) Oral daily  pantoprazole    Tablet 40 milliGRAM(s) Oral before breakfast  piperacillin/tazobactam IVPB.. 3.375 Gram(s) IV Intermittent every 8 hours  saccharomyces boulardii 250 milliGRAM(s) Oral two times a day  silver sulfADIAZINE 1% Cream 1 Application(s) Topical daily  thiamine 100 milliGRAM(s) Oral daily    MEDICATIONS  (PRN):  aluminum hydroxide/magnesium hydroxide/simethicone Suspension 30 milliLiter(s) Oral every 6 hours PRN Dyspepsia  dextrose 40% Gel 15 Gram(s) Oral once PRN Blood Glucose LESS THAN 70 milliGRAM(s)/deciliter  glucagon  Injectable 1 milliGRAM(s) IntraMuscular once PRN Glucose LESS THAN 70 milligrams/deciliter  HYDROmorphone  Injectable 1 milliGRAM(s) IV Push every 3 hours PRN Severe Pain (7 - 10)  oxyCODONE    IR 5 milliGRAM(s) Oral every 3 hours PRN Mild Pain (1 - 3)  oxyCODONE    IR 10 milliGRAM(s) Oral every 3 hours PRN Moderate Pain (4 - 6)  zolpidem 5 milliGRAM(s) Oral at bedtime PRN Insomnia      Physical exam:   Alert and Oriented x3, family at bedside.  Non labored breathing   Right AKA with clean dressing, covered with iodoform.  Right thigh is soft and compressible.  Left tma with clean dressing in place.  Abdomen is soft, distended, non tender.       Assessment:  • S/p right AKA with proximal femoral nerve resection, to complete guillotine BKA    Patient currently stable    •   Plan:   • DVT prophylaxis as prescribed, including use of compression devices and ankle pumps to the left LE   •  physical therapy EVAL pending   •  Amputee support team to see in the AM  •  Elevate to Right stump on pillows   • Pain control as clinically indicated, disagree with PCA at this time, patient's pain can be adequately managed with prescribed regimen.    • Incentive spirometry encouraged  • Discharge planning – anticipated discharge is  subacute rehabilitation vs  acute rehabilitation

## 2019-08-07 NOTE — PHARMACOTHERAPY INTERVENTION NOTE - COMMENTS
s/w MD to clarify prn indication for pain meds. Toradol dose also changed.
Confirmed with ID, vancomycin no longer required.  Recommended discontinuing vancomycin.
Vancomycin trough ordered

## 2019-08-07 NOTE — PROGRESS NOTE ADULT - SUBJECTIVE AND OBJECTIVE BOX
Patient is a 54y old  Male who presents with a chief complaint of sepsis, foot wound (01 Aug 2019 13:18) s/p BKA controlling pain     Overnight AM events:  Patient seen and examined at beside. No acute events over night. POD #6 s/p Right BKA. Pain is well controlled. When pain is present, he c/o of shooting pain. Pt is tolerating PO diet w/o n/v/diarrhea    Pt endorses that he caused his current situation. Denies any s/s of depression or suicidal/homicidal ideations/intentions.     ROS: Denies CP, HA, SOB, n/v/, fever/chills, abdominal/back pain, vision changes, numbness/tingling, blood in urine or stool     CAPILLARY BLOOD GLUCOSE  POCT Blood Glucose.: 242 mg/dL (08.06.19 @ 12:14)  POCT Blood Glucose.: 224 mg/dL (08.06.19 @ 07:42)  POCT Blood Glucose.: 268 mg/dL (08.05.19 @ 16:24)    Physical Examination  Vital Signs Last 24 Hrs  T(C): 37.1 (07 Aug 2019 00:35), Max: 37.1 (07 Aug 2019 00:35)  T(F): 98.8 (07 Aug 2019 00:35), Max: 98.8 (07 Aug 2019 00:35)  HR: 119 (07 Aug 2019 00:35) (107 - 119)  BP: 137/83 (07 Aug 2019 00:35) (132/89 - 158/91)  RR: 18 (07 Aug 2019 00:35) (18 - 19)  SpO2: 95% (07 Aug 2019 00:35) (95% - 97%)    GENERAL: Obese, WD WN, poor hygiene   HEENT: PERRLA, pupil constricted to light b/l, poor dentition   RESP: CTA b/l,, no crackles, no wheezing or rhonchi   CVS: RRR, Normal S1 & S2, No m/r/g  Abdomen: soft +BS normoactive, NT, ND, b/l groin intertriginous dermatitis - no signs of infection   Extremities: Rt foot s/p BKA with wound vac, L foot - superficial ulcer 4x5cm, no active drainange, s/p partial amputation of foot clean dry no evidence of infection   Neuro: AAOX3, non focal     LAB                         7.7    6.99  )-----------( 322      ( 06 Aug 2019 07:59 )             26.9     08-06    138  |  105  |  18.0  ----------------------------<  224<H>  3.1<L>   |  24.0  |  0.84    Ca    7.3<L>      06 Aug 2019 07:59  Phos  2.8     08-05  Mg     1.3     08-05    TPro  6.2<L>  /  Alb  1.9<L>  /  TBili  0.4  /  DBili  x   /  AST  27  /  ALT  18  /  AlkPhos  774<H>  08-05      IMAGING  < from: TTE Echo Complete w/Doppler (08.03.19 @ 13:18) >  Summary:   1. Technically limited study.   2. Normal left ventricular internal cavity size.   3. Normal global left ventricular systolic function.   4. Left ventricular ejection fraction, by visual estimation, is 65 to   70%.   5. The left ventricular diastolic function could not be assessed in this   study.   6. Normal right ventricular size and function.   7. Moderately enlarged left atrium.   8. Normal trileaflet aortic valve with normal opening.   9. Estimated pulmonary artery systolic pressure is 45.4 mmHg assuming a   right atrial pressure of 5 mmHg, which is consistent with mild pulmonary   hypertension.  10. There is no evidence of pericardial effusion.    < end of copied text >      < from: Xray Ankle Complete 3 Views, Right (07.31.19 @ 22:25) >  IMPRESSION:   Soft tissue swelling, ulcerations consistent with diffuse   osteomyelitis involving the phalanges and metatarsal bones tarsal bones   possibly ankle joint/show bilateral medial malleolus.    < end of copied text >    < from: Xray Foot AP + Lateral + Oblique, Right (07.31.19 @ 22:25) >  MPRESSION:   Soft tissue swelling, ulcerations consistent with diffuse   osteomyelitis involving the phalanges and metatarsal bones tarsal bones   possibly ankle joint/show bilateral medial malleolus.    < end of copied text >    < from: Xray Chest 1 View-PORTABLE IMMEDIATE (07.31.19 @ 20:04) >  Impression:  No evidence of acute cardiopulmonary disease. Allowing for differences in   technique, no significant change since 5/11/2016..    < end of copied text >        MEDICATIONS  (STANDING):  ascorbic acid 500 milliGRAM(s) Oral daily  dextrose 5%. 1000 milliLiter(s) (50 mL/Hr) IV Continuous <Continuous>  dextrose 50% Injectable 12.5 Gram(s) IV Push once  dextrose 50% Injectable 25 Gram(s) IV Push once  dextrose 50% Injectable 25 Gram(s) IV Push once  enoxaparin Injectable 40 milliGRAM(s) SubCutaneous daily  ferrous    sulfate 325 milliGRAM(s) Oral four times a day  folic acid 1 milliGRAM(s) Oral daily  gabapentin 300 milliGRAM(s) Oral two times a day  insulin lispro (HumaLOG) corrective regimen sliding scale   SubCutaneous three times a day before meals  metoprolol tartrate 25 milliGRAM(s) Oral two times a day  multivitamin 1 Tablet(s) Oral daily  pantoprazole    Tablet 40 milliGRAM(s) Oral before breakfast  piperacillin/tazobactam IVPB.. 3.375 Gram(s) IV Intermittent every 8 hours  potassium chloride    Tablet ER 40 milliEquivalent(s) Oral every 4 hours  saccharomyces boulardii 250 milliGRAM(s) Oral two times a day  silver sulfADIAZINE 1% Cream 1 Application(s) Topical daily  sodium chloride 0.9%. 1000 milliLiter(s) (75 mL/Hr) IV Continuous <Continuous>  thiamine 100 milliGRAM(s) Oral daily  vancomycin  IVPB 750 milliGRAM(s) IV Intermittent every 12 hours    MEDICATIONS  (PRN):  aluminum hydroxide/magnesium hydroxide/simethicone Suspension 30 milliLiter(s) Oral every 6 hours PRN Dyspepsia  dextrose 40% Gel 15 Gram(s) Oral once PRN Blood Glucose LESS THAN 70 milliGRAM(s)/deciliter  glucagon  Injectable 1 milliGRAM(s) IntraMuscular once PRN Glucose LESS THAN 70 milligrams/deciliter  HYDROmorphone   Tablet 4 milliGRAM(s) Oral every 3 hours PRN Moderate Pain (4 - 6)  HYDROmorphone   Tablet 6 milliGRAM(s) Oral every 6 hours PRN Severe Pain (7 - 10)  HYDROmorphone  Injectable 1 milliGRAM(s) SubCutaneous every 6 hours PRN Severe Pain persisting 1 hour after oral opioids  ketorolac 10 milliGRAM(s) Oral every 8 hours PRN Mild Pain (1 - 3)

## 2019-08-07 NOTE — DIETITIAN INITIAL EVALUATION ADULT. - PROBLEM SELECTOR PLAN 1
severe right foot wound with ulcer to bone, necrotic tissue and maggots causing severe sepsis  podiatry assessed and washed wound out, foot is not salvageable  Ortho consulted for BKA, requesting medical clearance  Given multiple electrolyte abnormalities, sepsis, sotero and anemia cannot clear patient for surgery at this time  If patient has improvement of electrolytes and vitals, as well as stable hemoglobin in am will reassess patient for clearance tomorrow morning  will continue patient on zosyn and vancomycin (check level)  follow up blood cultures  ortho and podiatry recs appreciated  ID consult in am

## 2019-08-07 NOTE — BRIEF OPERATIVE NOTE - NSICDXBRIEFPREOP_GEN_ALL_CORE_FT
PRE-OP DIAGNOSIS:  Gangrene of right foot 01-Aug-2019 15:29:46  Marily Gage
PRE-OP DIAGNOSIS:  Infestation, maggots 01-Aug-2019 15:30:06  Marily Gaeg  Gangrene of right foot 01-Aug-2019 15:29:46  Marily Gage

## 2019-08-07 NOTE — DIETITIAN INITIAL EVALUATION ADULT. - OTHER INFO
53 y/o M, POD #6 Guillotine amputation below right knee. Hx: opioid and ETOH abuse.  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 Pt admitted with severe sepsis secondary to wet gangrene of RLE, complicated by maggot infestation of wound. Patient just returned from OR for right AKA.  s/p right BKA 8/1/19.    Pt very groggy, poor historian.   Per documentation, prior to surgery, Pt was eating great 100% PO intake. Unknown weight history.   Per documentation, Pt hasn't left house in a month due to foot ulcers, so friends have been delivering food and medication.   RD to follow up with diet education prn.

## 2019-08-07 NOTE — BRIEF OPERATIVE NOTE - NSICDXBRIEFPOSTOP_GEN_ALL_CORE_FT
POST-OP DIAGNOSIS:  Gangrene of right foot 01-Aug-2019 15:30:50  Marily Gage
POST-OP DIAGNOSIS:  Gangrene of right foot 01-Aug-2019 15:30:50  Marily Gage  [facility] 01-Aug-2019 15:30:23  Marily Gage

## 2019-08-07 NOTE — DIETITIAN INITIAL EVALUATION ADULT. - PROBLEM SELECTOR PLAN 8
patient on basaglar 26 u qhs and metformin at home  given patient npo and glucose minimally elevated will give 8 units lantus now and monitor FS with ISS Q6  once patient no longer NPO please titrate lantus and ISS accordingly  check hb a1c

## 2019-08-07 NOTE — BRIEF OPERATIVE NOTE - OPERATION/FINDINGS
viable musculature, soft tissue at new amputation site
Right foot and ankle completely gangrenous - profound infestation of foot and ankle superficial & deep tissues with copious live maggots tunneling throughout; exposed mummified bone stripped of tissue by maggots; Right nicko escalera

## 2019-08-07 NOTE — PROGRESS NOTE ADULT - SUBJECTIVE AND OBJECTIVE BOX
Patient just returned from OR for right AKA.  s/p right BKA 8/1/19.    Presently patient reports      55 y/o M, POD #6 Guillotine amputation below right knee. Hx: opioid and ETOH abuse, previously on Suboxone. Last picked up 04/17/19. He was found sitting up in bed, NAD, A&Ox3. Discussed continuation of current regimen, with return to Suboxone prior to facility discharge. He does not want to be weaned down on opioids before changing back to Suboxone. Advised that this might trigger withdrawal symptoms. Patient verbalizes understanding.     Problem/Plan - 1:  ·  Problem: Acute postoperative pain of extremity.  Plan: 1. Gabapentin 300mg PO BID  2. Resume Tylenol 975mg TID  3. Dilaudid 4mg PO q4-6hrs PRN moderate-severe pain  - Consider discontinuation of alternate dose of Dilaudid 6mg PO 48hrs post op  - Discontinue Dilaudid 1mg subcutaneous q6hrs  48 hrs post-op  4. Resume Robaxin 750mg PO q8hrs   5. Consider Celebrex 200mg daily as alternative to Ketorolac presently ordered.      Problem/Plan - 2:  ·  Problem: Polysubstance abuse.  Plan: 1. Not a good candidate for ongoing opioid use  - Resume Suboxone prior to facility discharge. Patient just returned from OR for right AKA.  s/p right BKA 8/1/19.    Presently patient reports pretty significant pain presently at the surgical site.  He also reports phantom sensation + pain right LE.      55 y/o M, POD #6 Guillotine amputation below right knee. Hx: opioid and ETOH abuse, previously on Suboxone. Last picked up 04/17/19. He was found sitting up in bed, NAD, A&Ox3. Discussed continuation of current regimen, with return to Suboxone prior to facility discharge. He does not want to be weaned down on opioids before changing back to Suboxone. Advised that this might trigger withdrawal symptoms. Patient verbalizes understanding.     Problem/Plan - 1:  ·  Problem: Acute postoperative pain of extremity.  Plan: 1. Gabapentin 300mg PO BID  2. Resume Tylenol 975mg TID  3. Dilaudid 4mg PO q4-6hrs PRN moderate-severe pain  - Consider discontinuation of alternate dose of Dilaudid 6mg PO 48hrs post op  - Discontinue Dilaudid 1mg subcutaneous q6hrs  48 hrs post-op  4. Resume Robaxin 750mg PO q8hrs   5. Consider Celebrex 200mg daily as alternative to Ketorolac presently ordered.      Problem/Plan - 2:  ·  Problem: Polysubstance abuse.  Plan: 1. Not a good candidate for ongoing opioid use  - Resume Suboxone prior to facility discharge.

## 2019-08-07 NOTE — PROGRESS NOTE ADULT - ASSESSMENT
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 Pt admitted with severe sepsis secondary to wet gangrene of RLE, complicated by maggot infestation of wound. Vascular sx consulted and pt s/p wound cleansing and went to the OR for emergent source control and s/p  Guillotine R BKA. Post operatively day #1 remains stable. Sepsis resolved post sx. Empirically on vanco/ zosyn, Bcx negative. Hospital course complicated by acute on chronic anemia in the setting of AOCD, s/p 2 u prbc on admission and 1 u prbc intraop with appropriate response to hgb, remains stable thereafter. FOBT pending but no evidence of active bleeding. Also noted with BRANT/ hyponatremia likely due to dehydration/sepsis, s/p ivf fluid resuscitation with improving renal function and sodium. Slow clinical improvement. ID, Podiatry, Vascular, PM and Psych consults noted.     Severe sepsis 2/2 wet gangrene of the RLE with maggot infestation  - s/p emergent source control and s/p Guillotine BKA POD #5  - no intra op complications   - currently with down trending leukocytosis to wnl   - elevated esr/ crp   - afebrile  and normotensive - B cx negative X 2  - c/w Zosyn 3.375 g IV q8h D#6   - d/c Vanco (MRSA negative)   - pain control as per pain mx, no role for suboxone until off of acute narcotics   - c/w  Dilaudid 4mg PO q4hrs PRN moderate pain  - c/w Dilaudid 6mg PO q4hrs PRN severe pain   - c/w Dilaudid 1mg subcutaneous q6hrs PRN severe pain persisting 1 hour after oral opioids  - c/w Robaxin 500mg PO q6hrs ATC  - c/w toradol 15 mg IV q8hrs prn   - pt will not receive IV narcotics only sq given prior abuse   - c/w colace for prn constipation   - s/p tran post op   - c/w local wound care as per vascular sx   - Monitor BM - if worsening, will check for c.diff  - ID f/u noted and appreciated, d/w Dr. Santa the plan of care. Pt will c/w abx until 4 days s/p BKA revision which is scheduled for 8/7  - vascular sx f/u  noted and appreciated. RTOR on 8/7 for possible revision vs wound vac placement and future skin grafting. C/w elevation and immobilizer to prevent contracture.   - Podiatry consult noted and appreciated   - Pain mx consult noted, avoid narcotics in IV form   - Psych consult noted and appreciated, will re consult due to concerns for depression and also when ready to transition pt to suboxone     Sinus tachycardia  - Pt denies CP, SOB. Repeat EKG done today sinus tachycardia. Unchanged from ekg on admission and 8/3  - On admission - sinus tachycardia w/ T wave inversion in leads V4-6 now with controlled HR initially likely due to sepsis   - admission Trop neg X2   - no evidence of any issues on tele so discontinued   - repeat Troponin 8/4 x 1 - negative  - TTE: Normal LVSF, EF 65-70; No right heart strain; Mild Pul HTN; mod enlarged LA  - started on low dose bb   - CTA chest w/ IV contrast - neg for PE  - cardiology was consulted on admission but has not been re-consulted    Hypokalemia  - Resolved   - most likely due to acute GI loss  - monitor I&O; monitor for BM  - replenish K 40meq x 3 dose  - f/u BMP    BRANT - resolved  - likely secondary to dehydration with associated hyponatremia   -Improving renal fxn to wnl  -improving sodium to 134  - toleraing PO intake, D/c IVF  - monitor renal function post IV contrast   - will c/w monitoring renal fxn and sodium closely     B/l groin intertriginous dermatitis  -C/w Nystatin powder bid  -Continue to monitor for signs of infection     Acute on Chronic Anemia   - noted AOCD   - h/h remains stable   -Initial Hb 8.3, downtrended to 6.4   - likely bc pt was initially dehydrated   -s/p 2U PRBCs on admision and 1 u prbc intra op   - appropriate response to prbc transfusion   - FOBT pending, no evidence of bleeding   -Iron panel suggest AOCD   - will monitor hgb and transfuse for hgb <7   -C/w iron supplement, - c/w vitamin c     IDDM-2 poorly controlled  -HbA1c 7  - fs stable  - c/w accuchecks ACHS TID  - c/w HSS  - c/w hypoglycemia protocol     HLD  -C/W LIPITOR PO QHS     H/o polysubstance use disorder - currently taking Suboxone  -Utox negative  -Last dose of Suboxone was day prior to hospitalization   -currently pt requiring narcotics for pain control   - labile emotions - psych reconsult for signs of depression  -Psych consult noted and appreciated, will reconsult when pt is off narcotics or defer to outpt management     Preventative Measures  DVT ppx: started lovenox 40 - benefits outweigh risk. s/p BKA, immobile, sedentary, platelets stable, renal functions wnl.    Advanced Directive: Full code  Next of kin: Brother Jose    Dispo: Pt s/p Right BKA, plan for revision of bka 8/7. Pending psych consult for possible depression. 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 Pt admitted with severe sepsis secondary to wet gangrene of RLE, complicated by maggot infestation of wound. Vascular sx consulted and pt s/p wound cleansing and went to the OR for emergent source control and s/p  Guillotine R BKA. Post operatively day #1 remains stable. Sepsis resolved post sx. Empirically on vanco/ zosyn, Bcx negative. Hospital course complicated by acute on chronic anemia in the setting of AOCD, s/p 2 u prbc on admission and 1 u prbc intraop with appropriate response to hgb, remains stable thereafter. FOBT pending but no evidence of active bleeding. Also noted with BRANT/ hyponatremia likely due to dehydration/sepsis, s/p ivf fluid resuscitation with improving renal function and sodium. Slow clinical improvement. ID, Podiatry, Vascular, PM and Psych consults noted.     Severe sepsis 2/2 wet gangrene of the RLE with maggot infestation  - s/p emergent source control and s/p Guillotine BKA POD #6  - no intra op complications   - currently with down trending leukocytosis to wnl   - elevated esr/ crp   - afebrile  and normotensive - B cx negative X 2  - c/w Zosyn 3.375 g IV q8h D#7  - d/c Vanco (MRSA negative)   - pain control as per pain mx, no role for suboxone until off of acute narcotics   - c/w  Dilaudid 4mg PO q4hrs PRN moderate pain  - c/w Dilaudid 6mg PO q4hrs PRN severe pain   - c/w Dilaudid 1mg subcutaneous q6hrs PRN severe pain persisting 1 hour after oral opioids  - c/w Robaxin 500mg PO q6hrs ATC  - c/w toradol 15 mg IV q8hrs prn   - pt will not receive IV narcotics only sq given prior abuse   - c/w colace for prn constipation   - s/p tran post op   - c/w local wound care as per vascular sx   - Monitor BM - if worsening, will check for c.diff  - ID f/u noted and appreciated, d/w Dr. Santa the plan of care. Pt will c/w abx until 4 days s/p BKA revision which is scheduled for today 8/7  - vascular sx f/u  noted and appreciated. RTOR on 8/7 for possible revision vs wound vac placement and future skin grafting. C/w elevation and immobilizer to prevent contracture.   - Podiatry consult noted and appreciated   - Pain mx consult noted, avoid narcotics in IV form   - Psych consult noted and appreciated, will re consult due to concerns for depression and also when ready to transition pt to suboxone     Sinus tachycardia  - Pt denies CP, SOB. Repeat EKG done today sinus tachycardia. Unchanged from ekg on admission and 8/3  - On admission - sinus tachycardia w/ T wave inversion in leads V4-6 now with controlled HR initially likely due to sepsis   - admission Trop neg X2   - no evidence of any issues on tele so discontinued   - repeat Troponin 8/4 x 1 - negative  - TTE: Normal LVSF, EF 65-70; No right heart strain; Mild Pul HTN; mod enlarged LA  - started on low dose bb   - CTA chest w/ IV contrast - neg for PE  - cardiology was consulted on admission but has not been re-consulted    Hypokalemia  - Resolved   - most likely due to acute GI loss  - monitor I&O; monitor for BM  - replenish K 40meq x 3 dose  - f/u BMP    BRANT - resolved  - likely secondary to dehydration with associated hyponatremia   -Improving renal fxn to wnl  -improving sodium to 134  - toleraing PO intake, D/c IVF  - monitor renal function post IV contrast   - will c/w monitoring renal fxn and sodium closely     B/l groin intertriginous dermatitis  -C/w Nystatin powder bid  -Continue to monitor for signs of infection     Acute on Chronic Anemia   - noted AOCD   - h/h remains stable   -Initial Hb 8.3, downtrended to 6.4   - likely bc pt was initially dehydrated   -s/p 2U PRBCs on admision and 1 u prbc intra op   - appropriate response to prbc transfusion   - FOBT pending, no evidence of bleeding   -Iron panel suggest AOCD   - will monitor hgb and transfuse for hgb <7   -C/w iron supplement, - c/w vitamin c     IDDM-2 poorly controlled  -HbA1c 7  - fs stable  - c/w accuchecks ACHS TID  - c/w HSS  - c/w hypoglycemia protocol     HLD  -C/W LIPITOR PO QHS     H/o polysubstance use disorder - currently taking Suboxone  -Utox negative  -Last dose of Suboxone was day prior to hospitalization   -currently pt requiring narcotics for pain control   - labile emotions - psych reconsult for signs of depression  -Psych consult noted and appreciated, will reconsult when pt is off narcotics or defer to outpt management     Preventative Measures  DVT ppx: started lovenox 40 - benefits outweigh risk. s/p BKA, immobile, sedentary, platelets stable, renal functions wnl.    Advanced Directive: Full code  Next of kin: Brother Jose    Dispo: Pt s/p Right BKA, pt to have revision of bka today 8/7. Pending psych consult for possible depression. 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 Pt admitted with severe sepsis secondary to wet gangrene of RLE, complicated by maggot infestation of wound. Vascular sx consulted and pt s/p wound cleansing and went to the OR for emergent source control and s/p  Guillotine R BKA. Post operatively day #1 remains stable. Sepsis resolved post sx. Empirically on vanco/ zosyn, Bcx negative. Hospital course complicated by acute on chronic anemia in the setting of AOCD, s/p 2 u prbc on admission and 1 u prbc intraop with appropriate response to hgb, remains stable thereafter. FOBT pending but no evidence of active bleeding. Also noted with BRANT/ hyponatremia likely due to dehydration/sepsis, s/p ivf fluid resuscitation with improving renal function and sodium. Slow clinical improvement. ID, Podiatry, Vascular, PM and Psych consults noted.     Severe sepsis 2/2 wet gangrene of the RLE with maggot infestation  - s/p emergent source control and s/p Guillotine BKA POD #6  - no intra op complications   - currently with down trending leukocytosis to wnl   - elevated esr/ crp   - afebrile  and normotensive - B cx negative X 2  - c/w Zosyn 3.375 g IV q8h D#7  - d/c Vanco (MRSA negative)   - pain control as per pain mx, no role for suboxone until off of acute narcotics   - c/w  Dilaudid 4mg PO q4hrs PRN moderate pain  - c/w Dilaudid 6mg PO q4hrs PRN severe pain   - c/w Dilaudid 1mg subcutaneous q6hrs PRN severe pain persisting 1 hour after oral opioids  - c/w Robaxin 500mg PO q6hrs ATC  - c/w toradol 15 mg IV q8hrs prn   - pt will not receive IV narcotics only sq given prior abuse   - c/w colace for prn constipation   - s/p tran post op   - c/w local wound care as per vascular sx   - Monitor BM - if worsening, will check for c.diff  - ID f/u noted and appreciated, d/w Dr. Santa the plan of care. Pt will c/w abx until 4 days s/p BKA revision which is scheduled for today 8/7  - vascular sx f/u  noted and appreciated. RTOR on 8/7 for possible revision vs wound vac placement and future skin grafting. C/w elevation and immobilizer to prevent contracture.   - Podiatry consult noted and appreciated   - Pain mx consult noted, avoid narcotics in IV form   - Psych consult noted and appreciated, will re consult due to concerns for depression and also when ready to transition pt to suboxone     Sinus tachycardia  - Pt denies CP, SOB. Repeat EKG done today sinus tachycardia. Unchanged from ekg on admission and 8/3  - On admission - sinus tachycardia w/ T wave inversion in leads V4-6 now with controlled HR initially likely due to sepsis   - admission Trop neg X2   - no evidence of any issues on tele so discontinued   - repeat Troponin 8/4 x 1 - negative  - TTE: Normal LVSF, EF 65-70; No right heart strain; Mild Pul HTN; mod enlarged LA  - started on low dose bb   - CTA chest w/ IV contrast - neg for PE  - cardiology was consulted on admission but has not been re-consulted    Hypokalemia  - Resolved   - most likely due to acute GI loss  - monitor I&O; monitor for BM  - replenish K 40meq x 3 dose  - f/u BMP    BRANT - resolved  - likely secondary to dehydration with associated hyponatremia   -Improving renal fxn to wnl  -improving sodium to 134  - toleraing PO intake, D/c IVF  - monitor renal function post IV contrast   - will c/w monitoring renal fxn and sodium closely     B/l groin intertriginous dermatitis  -C/w Nystatin powder bid  -Continue to monitor for signs of infection     Hypocalcemia  -Ca 7.7  -Corrected Ca 9.4  -Continue to monitor     Acute on Chronic Anemia   - noted AOCD   - h/h remains stable   -Initial Hb 8.3, downtrended to 6.4   - likely bc pt was initially dehydrated   -s/p 2U PRBCs on admision and 1 u prbc intra op   - appropriate response to prbc transfusion   - FOBT pending, no evidence of bleeding   -Iron panel suggest AOCD   - will monitor hgb and transfuse for hgb <7   -C/w iron supplement, - c/w vitamin c     IDDM-2 poorly controlled  -HbA1c 7  - fs stable  - c/w accuchecks ACHS TID  - c/w HSS  - c/w hypoglycemia protocol     HLD  -C/W Lipitor PO QHS     H/o polysubstance use disorder - currently taking Suboxone  -Utox negative  -Last dose of Suboxone was day prior to hospitalization   -currently pt requiring narcotics for pain control   - labile emotions - psych reconsult for signs of depression  -Psych consult noted and appreciated, will reconsult when pt is off narcotics or defer to outpt management     Preventative Measures  DVT ppx: started Lovenox 40 - benefits outweigh risk. s/p BKA, immobile, sedentary, platelets stable, renal functions wnl.    Advanced Directive: Full code  Next of kin: Brother Jose    Dispo: Pt s/p Right BKA, pt to have revision of bka today 8/7. Pending psych consult for possible depression.

## 2019-08-07 NOTE — DIETITIAN INITIAL EVALUATION ADULT. - PROBLEM SELECTOR PLAN 2
patient with tachycardia, leukocytosis with BRANT meeting criteria for severe sepsis 2/2 foot gangrene  c/w vanc/zosyn as above, f/u blood cultures  will also check ua  lactate 2.1, will repeat

## 2019-08-08 ENCOUNTER — RESULT REVIEW (OUTPATIENT)
Age: 54
End: 2019-08-08

## 2019-08-08 LAB
ANION GAP SERPL CALC-SCNC: 10 MMOL/L — SIGNIFICANT CHANGE UP (ref 5–17)
BASOPHILS # BLD AUTO: 0.04 K/UL — SIGNIFICANT CHANGE UP (ref 0–0.2)
BASOPHILS NFR BLD AUTO: 0.4 % — SIGNIFICANT CHANGE UP (ref 0–2)
BUN SERPL-MCNC: 21 MG/DL — HIGH (ref 8–20)
CALCIUM SERPL-MCNC: 7.7 MG/DL — LOW (ref 8.6–10.2)
CHLORIDE SERPL-SCNC: 104 MMOL/L — SIGNIFICANT CHANGE UP (ref 98–107)
CO2 SERPL-SCNC: 22 MMOL/L — SIGNIFICANT CHANGE UP (ref 22–29)
CREAT SERPL-MCNC: 0.89 MG/DL — SIGNIFICANT CHANGE UP (ref 0.5–1.3)
EOSINOPHIL # BLD AUTO: 0.08 K/UL — SIGNIFICANT CHANGE UP (ref 0–0.5)
EOSINOPHIL NFR BLD AUTO: 0.8 % — SIGNIFICANT CHANGE UP (ref 0–6)
GLUCOSE BLDC GLUCOMTR-MCNC: 219 MG/DL — HIGH (ref 70–99)
GLUCOSE BLDC GLUCOMTR-MCNC: 230 MG/DL — HIGH (ref 70–99)
GLUCOSE BLDC GLUCOMTR-MCNC: 245 MG/DL — HIGH (ref 70–99)
GLUCOSE BLDC GLUCOMTR-MCNC: 273 MG/DL — HIGH (ref 70–99)
GLUCOSE SERPL-MCNC: 230 MG/DL — HIGH (ref 70–115)
HCT VFR BLD CALC: 29.4 % — LOW (ref 39–50)
HGB BLD-MCNC: 8.6 G/DL — LOW (ref 13–17)
IMM GRANULOCYTES NFR BLD AUTO: 1 % — SIGNIFICANT CHANGE UP (ref 0–1.5)
LYMPHOCYTES # BLD AUTO: 1.25 K/UL — SIGNIFICANT CHANGE UP (ref 1–3.3)
LYMPHOCYTES # BLD AUTO: 12 % — LOW (ref 13–44)
MCHC RBC-ENTMCNC: 26.1 PG — LOW (ref 27–34)
MCHC RBC-ENTMCNC: 29.3 GM/DL — LOW (ref 32–36)
MCV RBC AUTO: 89.1 FL — SIGNIFICANT CHANGE UP (ref 80–100)
MONOCYTES # BLD AUTO: 0.76 K/UL — SIGNIFICANT CHANGE UP (ref 0–0.9)
MONOCYTES NFR BLD AUTO: 7.3 % — SIGNIFICANT CHANGE UP (ref 2–14)
NEUTROPHILS # BLD AUTO: 8.18 K/UL — HIGH (ref 1.8–7.4)
NEUTROPHILS NFR BLD AUTO: 78.5 % — HIGH (ref 43–77)
PLATELET # BLD AUTO: 457 K/UL — HIGH (ref 150–400)
POTASSIUM SERPL-MCNC: 4 MMOL/L — SIGNIFICANT CHANGE UP (ref 3.5–5.3)
POTASSIUM SERPL-SCNC: 4 MMOL/L — SIGNIFICANT CHANGE UP (ref 3.5–5.3)
RBC # BLD: 3.3 M/UL — LOW (ref 4.2–5.8)
RBC # FLD: 17.2 % — HIGH (ref 10.3–14.5)
SODIUM SERPL-SCNC: 136 MMOL/L — SIGNIFICANT CHANGE UP (ref 135–145)
WBC # BLD: 10.41 K/UL — SIGNIFICANT CHANGE UP (ref 3.8–10.5)
WBC # FLD AUTO: 10.41 K/UL — SIGNIFICANT CHANGE UP (ref 3.8–10.5)

## 2019-08-08 PROCEDURE — 93010 ELECTROCARDIOGRAM REPORT: CPT

## 2019-08-08 PROCEDURE — 99233 SBSQ HOSP IP/OBS HIGH 50: CPT | Mod: GC

## 2019-08-08 PROCEDURE — 99232 SBSQ HOSP IP/OBS MODERATE 35: CPT

## 2019-08-08 RX ORDER — HYDROMORPHONE HYDROCHLORIDE 2 MG/ML
4 INJECTION INTRAMUSCULAR; INTRAVENOUS; SUBCUTANEOUS EVERY 4 HOURS
Refills: 0 | Status: DISCONTINUED | OUTPATIENT
Start: 2019-08-08 | End: 2019-08-13

## 2019-08-08 RX ORDER — METOPROLOL TARTRATE 50 MG
5 TABLET ORAL ONCE
Refills: 0 | Status: DISCONTINUED | OUTPATIENT
Start: 2019-08-08 | End: 2019-08-13

## 2019-08-08 RX ORDER — SODIUM CHLORIDE 9 MG/ML
500 INJECTION, SOLUTION INTRAVENOUS ONCE
Refills: 0 | Status: COMPLETED | OUTPATIENT
Start: 2019-08-08 | End: 2019-08-08

## 2019-08-08 RX ORDER — METOPROLOL TARTRATE 50 MG
25 TABLET ORAL THREE TIMES A DAY
Refills: 0 | Status: DISCONTINUED | OUTPATIENT
Start: 2019-08-08 | End: 2019-08-09

## 2019-08-08 RX ORDER — DULOXETINE HYDROCHLORIDE 30 MG/1
60 CAPSULE, DELAYED RELEASE ORAL
Refills: 0 | Status: DISCONTINUED | OUTPATIENT
Start: 2019-08-08 | End: 2019-08-13

## 2019-08-08 RX ORDER — HYDROMORPHONE HYDROCHLORIDE 2 MG/ML
2 INJECTION INTRAMUSCULAR; INTRAVENOUS; SUBCUTANEOUS EVERY 4 HOURS
Refills: 0 | Status: DISCONTINUED | OUTPATIENT
Start: 2019-08-08 | End: 2019-08-13

## 2019-08-08 RX ORDER — ZOLPIDEM TARTRATE 10 MG/1
1 TABLET ORAL
Qty: 0 | Refills: 0 | DISCHARGE

## 2019-08-08 RX ORDER — SODIUM CHLORIDE 9 MG/ML
1000 INJECTION, SOLUTION INTRAVENOUS
Refills: 0 | Status: DISCONTINUED | OUTPATIENT
Start: 2019-08-08 | End: 2019-08-10

## 2019-08-08 RX ORDER — DULOXETINE HYDROCHLORIDE 30 MG/1
1 CAPSULE, DELAYED RELEASE ORAL
Qty: 0 | Refills: 0 | DISCHARGE

## 2019-08-08 RX ADMIN — HYDROMORPHONE HYDROCHLORIDE 1 MILLIGRAM(S): 2 INJECTION INTRAMUSCULAR; INTRAVENOUS; SUBCUTANEOUS at 00:20

## 2019-08-08 RX ADMIN — Medication 325 MILLIGRAM(S): at 17:22

## 2019-08-08 RX ADMIN — Medication 25 MILLIGRAM(S): at 05:48

## 2019-08-08 RX ADMIN — Medication 325 MILLIGRAM(S): at 00:25

## 2019-08-08 RX ADMIN — Medication 25 MILLIGRAM(S): at 13:55

## 2019-08-08 RX ADMIN — HYDROMORPHONE HYDROCHLORIDE 1 MILLIGRAM(S): 2 INJECTION INTRAMUSCULAR; INTRAVENOUS; SUBCUTANEOUS at 10:38

## 2019-08-08 RX ADMIN — Medication 25 MILLIGRAM(S): at 21:53

## 2019-08-08 RX ADMIN — Medication 250 MILLIGRAM(S): at 05:49

## 2019-08-08 RX ADMIN — HYDROMORPHONE HYDROCHLORIDE 1 MILLIGRAM(S): 2 INJECTION INTRAMUSCULAR; INTRAVENOUS; SUBCUTANEOUS at 15:13

## 2019-08-08 RX ADMIN — Medication 975 MILLIGRAM(S): at 05:48

## 2019-08-08 RX ADMIN — PIPERACILLIN AND TAZOBACTAM 25 GRAM(S): 4; .5 INJECTION, POWDER, LYOPHILIZED, FOR SOLUTION INTRAVENOUS at 13:57

## 2019-08-08 RX ADMIN — HYDROMORPHONE HYDROCHLORIDE 1 MILLIGRAM(S): 2 INJECTION INTRAMUSCULAR; INTRAVENOUS; SUBCUTANEOUS at 05:51

## 2019-08-08 RX ADMIN — HYDROMORPHONE HYDROCHLORIDE 1 MILLIGRAM(S): 2 INJECTION INTRAMUSCULAR; INTRAVENOUS; SUBCUTANEOUS at 20:10

## 2019-08-08 RX ADMIN — SODIUM CHLORIDE 1500 MILLILITER(S): 9 INJECTION, SOLUTION INTRAVENOUS at 17:25

## 2019-08-08 RX ADMIN — METHOCARBAMOL 750 MILLIGRAM(S): 500 TABLET, FILM COATED ORAL at 21:52

## 2019-08-08 RX ADMIN — Medication 975 MILLIGRAM(S): at 14:10

## 2019-08-08 RX ADMIN — SODIUM CHLORIDE 100 MILLILITER(S): 9 INJECTION, SOLUTION INTRAVENOUS at 17:54

## 2019-08-08 RX ADMIN — GABAPENTIN 300 MILLIGRAM(S): 400 CAPSULE ORAL at 05:49

## 2019-08-08 RX ADMIN — Medication 975 MILLIGRAM(S): at 21:53

## 2019-08-08 RX ADMIN — PANTOPRAZOLE SODIUM 40 MILLIGRAM(S): 20 TABLET, DELAYED RELEASE ORAL at 05:49

## 2019-08-08 RX ADMIN — Medication 325 MILLIGRAM(S): at 23:37

## 2019-08-08 RX ADMIN — DULOXETINE HYDROCHLORIDE 60 MILLIGRAM(S): 30 CAPSULE, DELAYED RELEASE ORAL at 21:56

## 2019-08-08 RX ADMIN — ATORVASTATIN CALCIUM 40 MILLIGRAM(S): 80 TABLET, FILM COATED ORAL at 21:53

## 2019-08-08 RX ADMIN — HYDROMORPHONE HYDROCHLORIDE 1 MILLIGRAM(S): 2 INJECTION INTRAMUSCULAR; INTRAVENOUS; SUBCUTANEOUS at 21:11

## 2019-08-08 RX ADMIN — Medication 975 MILLIGRAM(S): at 23:15

## 2019-08-08 RX ADMIN — HYDROMORPHONE HYDROCHLORIDE 1 MILLIGRAM(S): 2 INJECTION INTRAMUSCULAR; INTRAVENOUS; SUBCUTANEOUS at 02:37

## 2019-08-08 RX ADMIN — Medication 2: at 11:49

## 2019-08-08 RX ADMIN — Medication 100 MILLIGRAM(S): at 11:13

## 2019-08-08 RX ADMIN — Medication 1 MILLIGRAM(S): at 11:14

## 2019-08-08 RX ADMIN — Medication 2: at 08:11

## 2019-08-08 RX ADMIN — GABAPENTIN 300 MILLIGRAM(S): 400 CAPSULE ORAL at 13:53

## 2019-08-08 RX ADMIN — PIPERACILLIN AND TAZOBACTAM 25 GRAM(S): 4; .5 INJECTION, POWDER, LYOPHILIZED, FOR SOLUTION INTRAVENOUS at 05:49

## 2019-08-08 RX ADMIN — HYDROMORPHONE HYDROCHLORIDE 1 MILLIGRAM(S): 2 INJECTION INTRAMUSCULAR; INTRAVENOUS; SUBCUTANEOUS at 14:13

## 2019-08-08 RX ADMIN — Medication 1 TABLET(S): at 11:13

## 2019-08-08 RX ADMIN — Medication 2: at 17:21

## 2019-08-08 RX ADMIN — DULOXETINE HYDROCHLORIDE 20 MILLIGRAM(S): 30 CAPSULE, DELAYED RELEASE ORAL at 05:48

## 2019-08-08 RX ADMIN — Medication 325 MILLIGRAM(S): at 05:48

## 2019-08-08 RX ADMIN — PIPERACILLIN AND TAZOBACTAM 25 GRAM(S): 4; .5 INJECTION, POWDER, LYOPHILIZED, FOR SOLUTION INTRAVENOUS at 21:52

## 2019-08-08 RX ADMIN — HYDROMORPHONE HYDROCHLORIDE 1 MILLIGRAM(S): 2 INJECTION INTRAMUSCULAR; INTRAVENOUS; SUBCUTANEOUS at 06:23

## 2019-08-08 RX ADMIN — Medication 250 MILLIGRAM(S): at 17:22

## 2019-08-08 RX ADMIN — Medication 325 MILLIGRAM(S): at 11:14

## 2019-08-08 RX ADMIN — HYDROMORPHONE HYDROCHLORIDE 1 MILLIGRAM(S): 2 INJECTION INTRAMUSCULAR; INTRAVENOUS; SUBCUTANEOUS at 23:20

## 2019-08-08 RX ADMIN — METHOCARBAMOL 750 MILLIGRAM(S): 500 TABLET, FILM COATED ORAL at 05:49

## 2019-08-08 RX ADMIN — HYDROMORPHONE HYDROCHLORIDE 1 MILLIGRAM(S): 2 INJECTION INTRAMUSCULAR; INTRAVENOUS; SUBCUTANEOUS at 02:07

## 2019-08-08 RX ADMIN — Medication 975 MILLIGRAM(S): at 06:00

## 2019-08-08 RX ADMIN — HYDROMORPHONE HYDROCHLORIDE 1 MILLIGRAM(S): 2 INJECTION INTRAMUSCULAR; INTRAVENOUS; SUBCUTANEOUS at 11:03

## 2019-08-08 RX ADMIN — GABAPENTIN 300 MILLIGRAM(S): 400 CAPSULE ORAL at 21:53

## 2019-08-08 RX ADMIN — Medication 1 APPLICATION(S): at 11:14

## 2019-08-08 RX ADMIN — METHOCARBAMOL 750 MILLIGRAM(S): 500 TABLET, FILM COATED ORAL at 13:55

## 2019-08-08 RX ADMIN — Medication 500 MILLIGRAM(S): at 11:14

## 2019-08-08 RX ADMIN — ENOXAPARIN SODIUM 40 MILLIGRAM(S): 100 INJECTION SUBCUTANEOUS at 11:14

## 2019-08-08 RX ADMIN — Medication 975 MILLIGRAM(S): at 13:54

## 2019-08-08 NOTE — PHYSICAL THERAPY INITIAL EVALUATION ADULT - CRITERIA FOR SKILLED THERAPEUTIC INTERVENTIONS
rehab potential/impairments found/predicted duration of therapy intervention/anticipated discharge recommendation/functional limitations in following categories/therapy frequency

## 2019-08-08 NOTE — PROGRESS NOTE ADULT - SUBJECTIVE AND OBJECTIVE BOX
54y old  Male seen bedside for left foot ulcer.    HPI: Pt seen bedside with AKA on right. States he is feeling much better than when he first came in.  Denies f/c/n/v/sob.       PMH: Hyperlipidemia  HTN (hypertension)  DM (diabetes mellitus)    Allergies: Allergy Status Unknown    Medications: acetaminophen   Tablet .. 975 milliGRAM(s) Oral once  Dakins Solution - 1/2 Strength 1 Application(s) Topical Once  dextrose 50% Injectable 50 milliLiter(s) IV Push Once  insulin regular  human recombinant. 6 Unit(s) IV Push once  vancomycin  IVPB 1000 milliGRAM(s) IV Intermittent once    FH:  PSX: Amputation of toe, left, traumatic  Amputation of great toe, right, traumatic    SH: acetaminophen   Tablet .. 975 milliGRAM(s) Oral once  Dakins Solution - 1/2 Strength 1 Application(s) Topical Once  dextrose 50% Injectable 50 milliLiter(s) IV Push Once  insulin regular  human recombinant. 6 Unit(s) IV Push once  vancomycin  IVPB 1000 milliGRAM(s) IV Intermittent once    Vital Signs Last 24 Hrs  T(C): 36.7 (08 Aug 2019 08:19), Max: 37.3 (07 Aug 2019 10:52)  T(F): 98 (08 Aug 2019 08:19), Max: 99.1 (07 Aug 2019 10:52)  HR: 105 (08 Aug 2019 08:19) (99 - 125)  BP: 150/93 (08 Aug 2019 08:19) (107/64 - 162/95)  BP(mean): --  RR: 20 (08 Aug 2019 08:19) (14 - 24)  SpO2: 93% (08 Aug 2019 00:24) (92% - 100%)    LABS                                   8.6    10.41 )-----------( 457      ( 08 Aug 2019 07:33 )             29.4     WBC Count: 10.41 K/uL (08-08 @ 07:33)  WBC Count: 6.39 K/uL (08-07 @ 07:29)  WBC Count: 6.99 K/uL (08-06 @ 07:59)  WBC Count: 7.72 K/uL (08-05 @ 07:03)  WBC Count: 8.09 K/uL (08-04 @ 08:30)    08-08    136  |  104  |  21.0<H>  ----------------------------<  230<H>  4.0   |  22.0  |  0.89    Ca    7.7<L>      08 Aug 2019 07:33  Phos  2.5     08-07  Mg     1.7     08-07        Sedimentation Rate, Erythrocyte (08.01.19 @ 16:47)    Sedimentation Rate, Erythrocyte: 61 mm/hr    C-Reactive Protein, Serum (08.01.19 @ 16:47)    C-Reactive Protein, Serum: 24.40 mg/dL          PHYSICAL EXAM  GEN: BREANNA MENDOZA is a pleasant well-nourished, well developed 54y Male in no acute distress, alert awake, and oriented to person, place and time.   LE Focused:    Vasc:  DP/PT nonpalpable b/l, unable to determine CFT, edema and gangrenous changes to right foot  Derm:. Left foot plantar ulcer ~7cm x 5cm with fibrogranular base and hyperkeratotic borders. no clinical sign of infection    Neuro: Protective sensation to digits   MSK: TMA left foot, BKA right     < from: Xray Foot AP + Lateral + Oblique, Right (07.31.19 @ 22:25) >     EXAM:  FOOT-RIGHT                         EXAM:  ANKLE-RIGHT                          PROCEDURE DATE:  07/31/2019          INTERPRETATION:  Radiographs of the RIGHT ankle       Radiographs of the RIGHT foot  CLINICAL INFORMATION: Soft tissue ulcerations. Assess for osteomyelitis.    TECHNIQUE:   Frontal, oblique and lateral views of the ankle were   obtained.  AP lateral oblique views of the RIGHT foot  FINDINGS:   No prior examinations are available for review.    Diffuse soft tissue swellingnoted with multiple soft tissue ulcerations.   There is rocker-bottom deformity with a destructive bone lesions of the   lateral medial malleolus, but tarsal bones and metatarsal bones with   pathologic fracture of the first metatarsal bone. Findingsconsistent   with a osteomyelitis. Distal phalanx of third digit absent.   IMPRESSION:   Soft tissue swelling, ulcerations consistent with diffuse   osteomyelitis involving the phalanges and metatarsal bones tarsal bones   possibly ankle joint/show bilateral medial malleolus.                  PREET LONG M.D., ATTENDING RADIOLOGIST  This document has been electronically signed. Aug  1 2019  7:13AM                  < end of copied text >  ------------------------------------------------------------  < from: Xray Foot AP + Lateral + Oblique, Left (08.07.19 @ 14:33) >     EXAM:  FOOT-LEFT                          PROCEDURE DATE:  08/07/2019          INTERPRETATION:  LEFT foot     CLINICAL INFORMATION diabetic ulcerations. TECHNIQUE: AP,lateral and   oblique views.    FINDINGS:  There is diffuse soft tissue swelling.  Status post transmetatarsal amputation of the first through fifth digits.   Anterior and soft tissue ulcerations noted with subcutaneous air. No   gross osteolytic lesions seen. Mild periosteal reaction remaining.    Metacarpal bones noted. No acute fracture.    IMPRESSION:    Soft tissue swelling with ulceration following transmetatarsal   amputation. Subtle of periosteal reaction of the fourth remnant   metatarsal bones.    If osteomyelitis is considered, despite conservative therapy, and soft   tissue / bone infection requires further assessment, follow-up MRI   recommended.        PREET LONG M.D., ATTENDING RADIOLOGIST  This document has been electronically signed. Aug  7 2019  2:40PM              < end of copied text >      A:  Left foot ulcer     P:  Patient evaluates, chart reviewed  X-rays left foot reviewed as above  Dressed L foot with Silvadene/DSD  Discussed treatment and plan with patient   Patient stable for discharge from podiatry standpoint  Patient can follow up with Dr. Hassan in his office   Podiatry will follow while in house  Seen with attending Dr. Morris     Wound Care Instructions:  1- Please apply silvadene to gauze and place on left plantar ulcer  2- Please secure with kerlix   3- Please change dressing daily

## 2019-08-08 NOTE — PROGRESS NOTE ADULT - SUBJECTIVE AND OBJECTIVE BOX
Patient is a 54y old  Male who presents with a chief complaint of sepsis, foot wound (01 Aug 2019 13:18) s/p BKA controlling pain     Overnight AM events:  Patient seen and examined at beside. No acute events over night. POD #7 s/p Right BKA and POD 1 s/p right AKA revision with proximal femoral nerve resection. Pain is well controlled. When pain is present, he c/o of shooting pain, likely phantom pain. Pt is tolerating PO diet w/o n/v/diarrhea.    Pt endorses that he caused his current situation. Denies any s/s of depression or suicidal/homicidal ideations/intentions.     ROS: Denies CP, HA, SOB, n/v/, fever/chills, abdominal/back pain, vision changes, numbness/tingling, blood in urine or stool     CAPILLARY BLOOD GLUCOSE  POCT  Blood Glucose: 351 mg/dL (08.07.19 @ 21:13)  POCT  Blood Glucose: 330 mg/dL (08.07.19 @ 16:19)  POCT  Blood Glucose: 221 mg/dL (08.07.19 @ 12:53)      Physical Examination  Vital Signs Last 24 Hrs  T(C): 36.7 (08 Aug 2019 00:24), Max: 37.3 (07 Aug 2019 10:52)  T(F): 98.1 (08 Aug 2019 00:24), Max: 99.1 (07 Aug 2019 10:52)  HR: 125 (08 Aug 2019 04:15) (99 - 125)  BP: 162/95 (08 Aug 2019 04:15) (107/64 - 162/95)  RR: 18 (08 Aug 2019 00:24) (14 - 24)  SpO2: 93% (08 Aug 2019 00:24) (92% - 100%)    GENERAL: Obese, WD WN, poor hygiene   HEENT: PERRLA, pupil constricted to light b/l, poor dentition   RESP: CTA b/l,, no crackles, no wheezing or rhonchi   CVS: RRR, Normal S1 & S2, No m/r/g  Abdomen: soft +BS normoactive, NT, ND, b/l groin intertriginous dermatitis - no signs of infection   Extremities: Rt foot s/p BKA and R AKA, L foot - superficial ulcer 4x5cm, no active drainage, s/p partial amputation of foot clean dry no evidence of infection   Neuro: AAOX3, non focal     LAB                         7.7    6.99  )-----------( 322      ( 06 Aug 2019 07:59 )             26.9     08-06    138  |  105  |  18.0  ----------------------------<  224<H>  3.1<L>   |  24.0  |  0.84    Ca    7.3<L>      06 Aug 2019 07:59  Phos  2.8     08-05  Mg     1.3     08-05    TPro  6.2<L>  /  Alb  1.9<L>  /  TBili  0.4  /  DBili  x   /  AST  27  /  ALT  18  /  AlkPhos  774<H>  08-05      IMAGING  < from: TTE Echo Complete w/Doppler (08.03.19 @ 13:18) >  Summary:   1. Technically limited study.   2. Normal left ventricular internal cavity size.   3. Normal global left ventricular systolic function.   4. Left ventricular ejection fraction, by visual estimation, is 65 to   70%.   5. The left ventricular diastolic function could not be assessed in this   study.   6. Normal right ventricular size and function.   7. Moderately enlarged left atrium.   8. Normal trileaflet aortic valve with normal opening.   9. Estimated pulmonary artery systolic pressure is 45.4 mmHg assuming a   right atrial pressure of 5 mmHg, which is consistent with mild pulmonary   hypertension.  10. There is no evidence of pericardial effusion.    < end of copied text >      < from: Xray Ankle Complete 3 Views, Right (07.31.19 @ 22:25) >  IMPRESSION:   Soft tissue swelling, ulcerations consistent with diffuse   osteomyelitis involving the phalanges and metatarsal bones tarsal bones   possibly ankle joint/show bilateral medial malleolus.    < end of copied text >    < from: Xray Foot AP + Lateral + Oblique, Right (07.31.19 @ 22:25) >  MPRESSION:   Soft tissue swelling, ulcerations consistent with diffuse   osteomyelitis involving the phalanges and metatarsal bones tarsal bones   possibly ankle joint/show bilateral medial malleolus.    < end of copied text >    < from: Xray Chest 1 View-PORTABLE IMMEDIATE (07.31.19 @ 20:04) >  Impression:  No evidence of acute cardiopulmonary disease. Allowing for differences in   technique, no significant change since 5/11/2016..    < end of copied text >        MEDICATIONS  (STANDING):  ascorbic acid 500 milliGRAM(s) Oral daily  dextrose 5%. 1000 milliLiter(s) (50 mL/Hr) IV Continuous <Continuous>  dextrose 50% Injectable 12.5 Gram(s) IV Push once  dextrose 50% Injectable 25 Gram(s) IV Push once  dextrose 50% Injectable 25 Gram(s) IV Push once  enoxaparin Injectable 40 milliGRAM(s) SubCutaneous daily  ferrous    sulfate 325 milliGRAM(s) Oral four times a day  folic acid 1 milliGRAM(s) Oral daily  gabapentin 300 milliGRAM(s) Oral two times a day  insulin lispro (HumaLOG) corrective regimen sliding scale   SubCutaneous three times a day before meals  metoprolol tartrate 25 milliGRAM(s) Oral two times a day  multivitamin 1 Tablet(s) Oral daily  pantoprazole    Tablet 40 milliGRAM(s) Oral before breakfast  piperacillin/tazobactam IVPB.. 3.375 Gram(s) IV Intermittent every 8 hours  potassium chloride    Tablet ER 40 milliEquivalent(s) Oral every 4 hours  saccharomyces boulardii 250 milliGRAM(s) Oral two times a day  silver sulfADIAZINE 1% Cream 1 Application(s) Topical daily  sodium chloride 0.9%. 1000 milliLiter(s) (75 mL/Hr) IV Continuous <Continuous>  thiamine 100 milliGRAM(s) Oral daily  vancomycin  IVPB 750 milliGRAM(s) IV Intermittent every 12 hours    MEDICATIONS  (PRN):  aluminum hydroxide/magnesium hydroxide/simethicone Suspension 30 milliLiter(s) Oral every 6 hours PRN Dyspepsia  dextrose 40% Gel 15 Gram(s) Oral once PRN Blood Glucose LESS THAN 70 milliGRAM(s)/deciliter  glucagon  Injectable 1 milliGRAM(s) IntraMuscular once PRN Glucose LESS THAN 70 milligrams/deciliter  HYDROmorphone   Tablet 4 milliGRAM(s) Oral every 3 hours PRN Moderate Pain (4 - 6)  HYDROmorphone   Tablet 6 milliGRAM(s) Oral every 6 hours PRN Severe Pain (7 - 10)  HYDROmorphone  Injectable 1 milliGRAM(s) SubCutaneous every 6 hours PRN Severe Pain persisting 1 hour after oral opioids  ketorolac 10 milliGRAM(s) Oral every 8 hours PRN Mild Pain (1 - 3) Patient is a 54y old  Male who presents with a chief complaint of sepsis, foot wound (01 Aug 2019 13:18) s/p BKA controlling pain     Overnight AM events:  Patient seen and examined at beside. No acute events over night. POD #7 s/p Right BKA and POD 1 s/p right AKA revision with proximal femoral nerve resection. Pain is well controlled. When pain is present, he c/o of shooting pain, likely phantom pain. Pt is tolerating PO diet w/o n/v/diarrhea. Pt noted to be tachycardic during admission, upon further questioning pt mentioned having h/o generalized anxiety/Panic attacks. He is seen by PMD Dr. Pierce at [facility]. Will call to obtain more information.     Pt endorses that he caused his current situation. Denies any s/s of depression or suicidal/homicidal ideations/intentions.     ROS: Denies CP, HA, SOB, n/v/, fever/chills, abdominal/back pain, vision changes, numbness/tingling, blood in urine or stool     CAPILLARY BLOOD GLUCOSE  POCT  Blood Glucose: 351 mg/dL (08.07.19 @ 21:13)  POCT  Blood Glucose: 330 mg/dL (08.07.19 @ 16:19)  POCT  Blood Glucose: 221 mg/dL (08.07.19 @ 12:53)      Physical Examination  Vital Signs Last 24 Hrs  T(C): 36.7 (08 Aug 2019 00:24), Max: 37.3 (07 Aug 2019 10:52)  T(F): 98.1 (08 Aug 2019 00:24), Max: 99.1 (07 Aug 2019 10:52)  HR: 125 (08 Aug 2019 04:15) (99 - 125)  BP: 162/95 (08 Aug 2019 04:15) (107/64 - 162/95)  RR: 18 (08 Aug 2019 00:24) (14 - 24)  SpO2: 93% (08 Aug 2019 00:24) (92% - 100%)    GENERAL: Obese, WD WN, poor hygiene   HEENT: PERRLA, pupil constricted to light b/l, poor dentition   RESP: CTA b/l,, no crackles, no wheezing or rhonchi   CVS: RRR, Normal S1 & S2, No m/r/g  Abdomen: soft +BS normoactive, NT, ND, b/l groin intertriginous dermatitis - no signs of infection   Extremities: Rt foot s/p BKA and R AKA, L foot - superficial ulcer 4x5cm, no active drainage, s/p partial amputation of foot clean dry no evidence of infection   Neuro: AAOX3, non focal     LAB                         7.7    6.99  )-----------( 322      ( 06 Aug 2019 07:59 )             26.9     08-06    138  |  105  |  18.0  ----------------------------<  224<H>  3.1<L>   |  24.0  |  0.84    Ca    7.3<L>      06 Aug 2019 07:59  Phos  2.8     08-05  Mg     1.3     08-05    TPro  6.2<L>  /  Alb  1.9<L>  /  TBili  0.4  /  DBili  x   /  AST  27  /  ALT  18  /  AlkPhos  774<H>  08-05      IMAGING  < from: TTE Echo Complete w/Doppler (08.03.19 @ 13:18) >  Summary:   1. Technically limited study.   2. Normal left ventricular internal cavity size.   3. Normal global left ventricular systolic function.   4. Left ventricular ejection fraction, by visual estimation, is 65 to   70%.   5. The left ventricular diastolic function could not be assessed in this   study.   6. Normal right ventricular size and function.   7. Moderately enlarged left atrium.   8. Normal trileaflet aortic valve with normal opening.   9. Estimated pulmonary artery systolic pressure is 45.4 mmHg assuming a   right atrial pressure of 5 mmHg, which is consistent with mild pulmonary   hypertension.  10. There is no evidence of pericardial effusion.    < end of copied text >      < from: Xray Ankle Complete 3 Views, Right (07.31.19 @ 22:25) >  IMPRESSION:   Soft tissue swelling, ulcerations consistent with diffuse   osteomyelitis involving the phalanges and metatarsal bones tarsal bones   possibly ankle joint/show bilateral medial malleolus.    < end of copied text >    < from: Xray Foot AP + Lateral + Oblique, Right (07.31.19 @ 22:25) >  MPRESSION:   Soft tissue swelling, ulcerations consistent with diffuse   osteomyelitis involving the phalanges and metatarsal bones tarsal bones   possibly ankle joint/show bilateral medial malleolus.    < end of copied text >    < from: Xray Chest 1 View-PORTABLE IMMEDIATE (07.31.19 @ 20:04) >  Impression:  No evidence of acute cardiopulmonary disease. Allowing for differences in   technique, no significant change since 5/11/2016..    < end of copied text >        MEDICATIONS  (STANDING):  ascorbic acid 500 milliGRAM(s) Oral daily  dextrose 5%. 1000 milliLiter(s) (50 mL/Hr) IV Continuous <Continuous>  dextrose 50% Injectable 12.5 Gram(s) IV Push once  dextrose 50% Injectable 25 Gram(s) IV Push once  dextrose 50% Injectable 25 Gram(s) IV Push once  enoxaparin Injectable 40 milliGRAM(s) SubCutaneous daily  ferrous    sulfate 325 milliGRAM(s) Oral four times a day  folic acid 1 milliGRAM(s) Oral daily  gabapentin 300 milliGRAM(s) Oral two times a day  insulin lispro (HumaLOG) corrective regimen sliding scale   SubCutaneous three times a day before meals  metoprolol tartrate 25 milliGRAM(s) Oral two times a day  multivitamin 1 Tablet(s) Oral daily  pantoprazole    Tablet 40 milliGRAM(s) Oral before breakfast  piperacillin/tazobactam IVPB.. 3.375 Gram(s) IV Intermittent every 8 hours  potassium chloride    Tablet ER 40 milliEquivalent(s) Oral every 4 hours  saccharomyces boulardii 250 milliGRAM(s) Oral two times a day  silver sulfADIAZINE 1% Cream 1 Application(s) Topical daily  sodium chloride 0.9%. 1000 milliLiter(s) (75 mL/Hr) IV Continuous <Continuous>  thiamine 100 milliGRAM(s) Oral daily  vancomycin  IVPB 750 milliGRAM(s) IV Intermittent every 12 hours    MEDICATIONS  (PRN):  aluminum hydroxide/magnesium hydroxide/simethicone Suspension 30 milliLiter(s) Oral every 6 hours PRN Dyspepsia  dextrose 40% Gel 15 Gram(s) Oral once PRN Blood Glucose LESS THAN 70 milliGRAM(s)/deciliter  glucagon  Injectable 1 milliGRAM(s) IntraMuscular once PRN Glucose LESS THAN 70 milligrams/deciliter  HYDROmorphone   Tablet 4 milliGRAM(s) Oral every 3 hours PRN Moderate Pain (4 - 6)  HYDROmorphone   Tablet 6 milliGRAM(s) Oral every 6 hours PRN Severe Pain (7 - 10)  HYDROmorphone  Injectable 1 milliGRAM(s) SubCutaneous every 6 hours PRN Severe Pain persisting 1 hour after oral opioids  ketorolac 10 milliGRAM(s) Oral every 8 hours PRN Mild Pain (1 - 3)

## 2019-08-08 NOTE — PROGRESS NOTE ADULT - ASSESSMENT
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 Pt admitted with severe sepsis secondary to wet gangrene of RLE, complicated by maggot infestation of wound. Vascular sx consulted and pt s/p wound cleansing and went to the OR for emergent source control and s/p  Guillotine R BKA. Post operatively day #1 remains stable. Sepsis resolved post sx. Empirically on vanco/ zosyn, Bcx negative. Hospital course complicated by acute on chronic anemia in the setting of AOCD, s/p 2 u prbc on admission and 1 u prbc intraop with appropriate response to hgb, remains stable thereafter. FOBT pending but no evidence of active bleeding. Also noted with BRANT/ hyponatremia likely due to dehydration/sepsis, s/p ivf fluid resuscitation with improving renal function and sodium. Slow clinical improvement. ID, Podiatry, Vascular, PM and Psych consults noted.     Severe sepsis 2/2 wet gangrene of the RLE with maggot infestation  - s/p emergent source control and s/p Guillotine BKA POD #7.  POD 1 s/p right AKA revision with proximal femoral nerve resection.  - no intra op complications   - currently with down trending leukocytosis to wnl   - elevated esr/ crp   - afebrile  and normotensive - B cx negative X 2  - c/w Zosyn 3.375 g IV q8h D#7  - d/c Vanco (MRSA negative)   - pain control as per pain mx, no role for suboxone until off of acute narcotics   - c/w  Dilaudid 4mg PO q4hrs PRN moderate pain  - c/w Dilaudid 6mg PO q4hrs PRN severe pain   - c/w Dilaudid 1mg subcutaneous q6hrs PRN severe pain persisting 1 hour after oral opioids  - c/w Robaxin 500mg PO q6hrs ATC  - c/w toradol 15 mg IV q8hrs prn   - pt will not receive IV narcotics only sq given prior abuse   - c/w colace for prn constipation   - s/p tran post op   - c/w local wound care as per vascular sx   - Monitor BM - if worsening, will check for c.diff  - ID f/u noted and appreciated, d/w Dr. Santa the plan of care. Pt will c/w abx until 4 days s/p BKA revision which is scheduled for today 8/7  - vascular sx f/u  noted and appreciated.  POD 1 s/p right AKA revision with proximal femoral nerve resection. Amputee support team on board.  - Podiatry consult noted and appreciated   - Pain mx consult noted, avoid narcotics in IV form   - Psych consult noted and appreciated, will re consult due to concerns for depression and also when ready to transition pt to suboxone     Sinus tachycardia  - Pt denies CP, SOB. Repeat EKG done today sinus tachycardia. Unchanged from ekg on admission and 8/3  - On admission - sinus tachycardia w/ T wave inversion in leads V4-6 now with controlled HR initially likely due to sepsis   - admission Trop neg X2   - no evidence of any issues on tele so discontinued   - repeat Troponin 8/4 x 1 - negative  - TTE: Normal LVSF, EF 65-70; No right heart strain; Mild Pul HTN; mod enlarged LA  - started on low dose bb   - CTA chest w/ IV contrast - neg for PE  - cardiology was consulted on admission but has not been re-consulted    BRANT - resolved  - likely secondary to dehydration with associated hyponatremia   -Improving renal fxn to wnl  -improving sodium to 134  - toleraing PO intake, D/c IVF  - monitor renal function post IV contrast   - will c/w monitoring renal fxn and sodium closely     B/l groin intertriginous dermatitis  -C/w Nystatin powder bid  -Continue to monitor for signs of infection     Hypocalcemia  -Ca 7.7  -Corrected Ca 9.4  -Continue to monitor     Acute on Chronic Anemia   - noted AOCD   - h/h remains stable   -Initial Hb 8.3, downtrended to 6.4   - likely bc pt was initially dehydrated   -s/p 2U PRBCs on admision and 1 u prbc intra op   - appropriate response to prbc transfusion   - FOBT pending, no evidence of bleeding   -Iron panel suggest AOCD   - will monitor hgb and transfuse for hgb <7   -C/w iron supplement, - c/w vitamin c     IDDM-2 poorly controlled  -HbA1c 7  - fs stable  - c/w accuchecks ACHS TID  - c/w HSS  - c/w hypoglycemia protocol     HLD  -C/W Lipitor PO QHS     H/o polysubstance use disorder - currently taking Suboxone  -Utox negative  -Last dose of Suboxone was day prior to hospitalization   -currently pt requiring narcotics for pain control   - labile emotions - psych reconsult for signs of depression  -Psych consult noted and appreciated, will reconsult when pt is off narcotics or defer to outpt management     Preventative Measures  DVT ppx: started Lovenox 40 - benefits outweigh risk. s/p BKA, immobile, sedentary, platelets stable, renal functions wnl.    Advanced Directive: Full code  Next of kin: Brother Jose    Dispo: Pt s/p Right BKA 8/1 and revision to R AKA on 8/7. Pending psych consult for possible depression. 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 Pt admitted with severe sepsis secondary to wet gangrene of RLE, complicated by maggot infestation of wound. Vascular sx consulted and pt s/p wound cleansing and went to the OR for emergent source control and s/p  Guillotine R BKA. Post operatively day #1 remains stable. Sepsis resolved post sx. Empirically on vanco/ zosyn, Bcx negative. Hospital course complicated by acute on chronic anemia in the setting of AOCD, s/p 2 u prbc on admission and 1 u prbc intraop with appropriate response to hgb, remains stable thereafter. FOBT pending but no evidence of active bleeding. Also noted with BRANT/ hyponatremia likely due to dehydration/sepsis, s/p ivf fluid resuscitation with improving renal function and sodium. Slow clinical improvement. ID, Podiatry, Vascular, PM and Psych consults noted.     Severe sepsis 2/2 wet gangrene of the RLE with maggot infestation  - s/p emergent source control and s/p Guillotine BKA POD #7.  POD 1 s/p right AKA revision with proximal femoral nerve resection.  - no intra op complications   - currently with down trending leukocytosis to wnl   - elevated esr/ crp   - afebrile  and normotensive - B cx negative X 2  - c/w Zosyn 3.375 g IV q8h D#7  - d/c Vanco (MRSA negative)   - pain control as per pain mx, no role for suboxone until off of acute narcotics   - c/w  Dilaudid 4mg PO q4hrs PRN moderate pain  - c/w Dilaudid 6mg PO q4hrs PRN severe pain   - c/w Dilaudid 1mg subcutaneous q6hrs PRN severe pain persisting 1 hour after oral opioids  - c/w Robaxin 500mg PO q6hrs ATC  - c/w toradol 15 mg IV q8hrs prn   - pt will not receive IV narcotics only sq given prior abuse   - c/w colace for prn constipation   - s/p tran post op   - c/w local wound care as per vascular sx   - Monitor BM - if worsening, will check for c.diff  - ID f/u noted and appreciated. Pt will c/w abx until 4 days s/p BKA revision - stop date 8/11   - vascular sx f/u  noted and appreciated.  POD 1 s/p right AKA revision with proximal femoral nerve resection. Amputee support team on board. Representatives from prosthetic devices at bedside this am.  - Podiatry consult noted and appreciated, C/w wound care   - Pain mx consult noted, avoid narcotics in IV form   - Psych consult noted and appreciated, will re consult due to concerns for depression and also when ready to transition pt to suboxone   -PT consult noted. Recommends acute rehab and also OT and PM&R consults.     Sinus tachycardia  -Improved. Pt mentioned this am that he has h/o generalized anxiety/Panic attacks. He is seen by PMD Dr. Pierce at [facility]. Will call to obtain more information. [phone]  - Pt denies CP, SOB. Repeat EKG done today sinus tachycardia. Unchanged from ekg on admission and 8/3  - On admission - sinus tachycardia w/ T wave inversion in leads V4-6 now with controlled HR initially likely due to sepsis   - admission Trop neg X2   - no evidence of any issues on tele so discontinued   - repeat Troponin 8/4 x 1 - negative  - TTE: Normal LVSF, EF 65-70; No right heart strain; Mild Pul HTN; mod enlarged LA  - started on low dose bb   - CTA chest w/ IV contrast - neg for PE  - cardiology was consulted on admission but has not been re-consulted    BRANT - resolved  - likely secondary to dehydration with associated hyponatremia   -Improving renal fxn to wnl  -improving sodium to 134  - toleraing PO intake, D/c IVF  - monitor renal function post IV contrast   - will c/w monitoring renal fxn and sodium closely     B/l groin intertriginous dermatitis  -C/w Nystatin powder bid  -Continue to monitor for signs of infection     Hypocalcemia  -Ca 7.7  -Corrected Ca 9.4  -Continue to monitor     Acute on Chronic Anemia   - noted AOCD   - h/h remains stable   -Initial Hb 8.3, downtrended to 6.4   - likely bc pt was initially dehydrated   -s/p 2U PRBCs on admision and 1 u prbc intra op   - appropriate response to prbc transfusion   - FOBT pending, no evidence of bleeding   -Iron panel suggest AOCD   - will monitor hgb and transfuse for hgb <7   -C/w iron supplement, - c/w vitamin c     IDDM-2 poorly controlled  -HbA1c 7  - fs stable  - c/w accuchecks ACHS TID  - c/w HSS  - c/w hypoglycemia protocol     HLD  -C/W Lipitor PO QHS     H/o polysubstance use disorder - currently taking Suboxone  -Utox negative  -Last dose of Suboxone was day prior to hospitalization   -currently pt requiring narcotics for pain control   - labile emotions - psych reconsult for signs of depression  -Psych consult noted and appreciated, will reconsult when pt is off narcotics or defer to outpt management     Preventative Measures  DVT ppx: started Lovenox 40 - benefits outweigh risk. s/p BKA, immobile, sedentary, platelets stable, renal functions wnl.    Advanced Directive: Full code  Next of kin: Brother Jose    Dispo: Pt s/p Right BKA 8/1 and revision to R AKA on 8/7. Likely d/c to [facility]. Pt eval noted. Pending OT and PM&R consults. 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 Pt admitted with severe sepsis secondary to wet gangrene of RLE, complicated by maggot infestation of wound. Vascular sx consulted and pt s/p wound cleansing and went to the OR for emergent source control and s/p  Guillotine R BKA. Post operatively day #1 remains stable. Sepsis resolved post sx. Empirically on vanco/ zosyn, Bcx negative. Hospital course complicated by acute on chronic anemia in the setting of AOCD, s/p 2 u prbc on admission and 1 u prbc intraop with appropriate response to hgb, remains stable thereafter. FOBT pending but no evidence of active bleeding. Also noted with BRANT/ hyponatremia likely due to dehydration/sepsis, s/p ivf fluid resuscitation with improving renal function and sodium. Slow clinical improvement. ID, Podiatry, Vascular, PM and Psych consults noted. PT consult noted, recommends acute rehab. OT and PM&R consults pending. PM re-consulted s/p revision surgery as pt c/o worsening pain.     Severe sepsis 2/2 wet gangrene of the RLE with maggot infestation  - s/p emergent source control and s/p Guillotine BKA POD #7.  POD 1 s/p right AKA revision with proximal femoral nerve resection.  - no intra op complications   - currently with down trending leukocytosis to wnl   - elevated esr/ crp   - afebrile  and normotensive - B cx negative X 2  - c/w Zosyn 3.375 g IV q8h D#7  - d/c Vanco (MRSA negative)   - pain control as per pain mx, no role for suboxone until off of acute narcotics   - c/w  Dilaudid 4mg PO q4hrs PRN moderate pain  - c/w Dilaudid 6mg PO q4hrs PRN severe pain   - c/w Dilaudid 1mg subcutaneous q6hrs PRN severe pain persisting 1 hour after oral opioids  - c/w Robaxin 500mg PO q6hrs ATC  - c/w toradol 15 mg IV q8hrs prn   - pt will not receive IV narcotics only sq given prior abuse   - c/w colace for prn constipation   - s/p tran post op   - c/w local wound care as per vascular sx   - Monitor BM - if worsening, will check for c.diff  - ID f/u noted and appreciated. Pt will c/w abx until 4 days s/p BKA revision - stop date 8/11   - vascular sx f/u  noted and appreciated.  POD 1 s/p right AKA revision with proximal femoral nerve resection. Amputee support team on board. Representatives from prosthetic devices at bedside this am.  - Podiatry consult noted and appreciated, C/w wound care   - Pain mx consult noted, avoid narcotics in IV form. Re-consulted s/p revision sx for possible adjustment of pain regimen.   - Psych consult noted and appreciated, will re consult due to concerns for depression and also when ready to transition pt to suboxone   -PT consult noted. Recommends acute rehab and also OT and PM&R consults.     Sinus tachycardia  -Improved. Pt mentioned this am that he has h/o generalized anxiety/Panic attacks. He is seen by PMD Dr. Pierce at [facility]. Will call to obtain more information. [phone]  - Pt denies CP, SOB. Repeat EKG done today sinus tachycardia. Unchanged from ekg on admission and 8/3  - On admission - sinus tachycardia w/ T wave inversion in leads V4-6 now with controlled HR initially likely due to sepsis   - admission Trop neg X2   - no evidence of any issues on tele so discontinued   - repeat Troponin 8/4 x 1 - negative  - TTE: Normal LVSF, EF 65-70; No right heart strain; Mild Pul HTN; mod enlarged LA  - started on low dose bb   - CTA chest w/ IV contrast - neg for PE  - cardiology was consulted on admission but has not been re-consulted    BRANT - resolved  - likely secondary to dehydration with associated hyponatremia   -Improving renal fxn to wnl  -improving sodium to 134  - toleraing PO intake, D/c IVF  - monitor renal function post IV contrast   - will c/w monitoring renal fxn and sodium closely     B/l groin intertriginous dermatitis  -C/w Nystatin powder bid  -Continue to monitor for signs of infection     Hypocalcemia  -Ca 7.7  -Corrected Ca 9.4  -Continue to monitor     Acute on Chronic Anemia   - noted AOCD   - h/h remains stable   -Initial Hb 8.3, downtrended to 6.4   - likely bc pt was initially dehydrated   -s/p 2U PRBCs on admision and 1 u prbc intra op   - appropriate response to prbc transfusion   - FOBT pending, no evidence of bleeding   -Iron panel suggest AOCD   - will monitor hgb and transfuse for hgb <7   -C/w iron supplement, - c/w vitamin c     IDDM-2 poorly controlled  -HbA1c 7  - fs stable  - c/w accuchecks ACHS TID  - c/w HSS  - c/w hypoglycemia protocol     HLD  -C/W Lipitor PO QHS     H/o polysubstance use disorder - currently taking Suboxone  -Utox negative  -Last dose of Suboxone was day prior to hospitalization   -currently pt requiring narcotics for pain control   - labile emotions - psych reconsult for signs of depression  -Psych consult noted and appreciated, will reconsult when pt is off narcotics or defer to outpt management     Preventative Measures  DVT ppx: started Lovenox 40 - benefits outweigh risk. s/p BKA, immobile, sedentary, platelets stable, renal functions wnl.    Advanced Directive: Full code  Next of kin: Brother Jose    Dispo: Pt s/p Right BKA 8/1 and revision to R AKA on 8/7. Likely d/c to [facility]. Pt eval noted. Pending OT and PM&R consults. 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 Pt admitted with severe sepsis secondary to wet gangrene of RLE, complicated by maggot infestation of wound. Vascular sx consulted and pt s/p wound cleansing and went to the OR for emergent source control and s/p  Guillotine R BKA. Post operatively day #1 remains stable. Sepsis resolved post sx. Empirically on vanco/ zosyn, Bcx negative. Hospital course complicated by acute on chronic anemia in the setting of AOCD, s/p 2 u prbc on admission and 1 u prbc intraop with appropriate response to hgb, remains stable thereafter. FOBT pending but no evidence of active bleeding. Also noted with BRANT/ hyponatremia likely due to dehydration/sepsis, s/p ivf fluid resuscitation with improving renal function and sodium. Slow clinical improvement. ID, Podiatry, Vascular, PM and Psych consults noted. PT consult noted, recommends acute rehab. OT and PM&R consults pending. PM re-consulted noted, pain regimen adjusted.     Severe sepsis 2/2 wet gangrene of the RLE with maggot infestation  - s/p emergent source control and s/p Guillotine BKA POD #7.  POD 1 s/p right AKA revision with proximal femoral nerve resection.  - no intra op complications   - currently with down trending leukocytosis to wnl   - elevated esr/ crp   - afebrile  and normotensive - B cx negative X 2  - c/w Zosyn 3.375 g IV q8h D#7  - d/c Vanco (MRSA negative)   - pain control as per pain mx, no role for suboxone until off of acute narcotics   - c/w Dilaudid 2mg PO q4hrs PRN moderate pain  - c/w Dilaudid 4mg PO q4hrs PRN severe pain   - c/w Dilaudid 1mg IV 48hrs post op - d/c tomorrow 8/9   - c/w Robaxin 500mg PO q6hrs ATC  - c/w Gabapentin 300mg TID  - pt will not receive IV narcotics only sq given prior abuse   - c/w colace for prn constipation   - s/p tran post op   - c/w local wound care as per vascular sx   - Monitor BM - if worsening, will check for c.diff  - ID f/u noted and appreciated. Pt will c/w abx until 4 days s/p BKA revision - stop date 8/11   - vascular sx f/u  noted and appreciated.  POD 1 s/p right AKA revision with proximal femoral nerve resection. Amputee support team on board. Representatives from prosthetic devices at bedside this am.  - Podiatry consult noted and appreciated, C/w wound care   - Pain mx consult noted, avoid narcotics in IV form. Re-consulted s/p revision sx for possible adjustment of pain regimen.   - Psych consult noted and appreciated, will re consult due to concerns for depression and also when ready to transition pt to suboxone   -PT consult noted. Recommends acute rehab and also OT and PM&R consults.     Sinus tachycardia  -Improved. Pt mentioned this am that he has h/o generalized anxiety/Panic attacks. He is seen by PMD Dr. Pierce at [facility]. Will call to obtain more information. [phone]  - Pt denies CP, SOB. Repeat EKG done today sinus tachycardia. Unchanged from ekg on admission and 8/3  - On admission - sinus tachycardia w/ T wave inversion in leads V4-6 now with controlled HR initially likely due to sepsis   - admission Trop neg X2   - no evidence of any issues on tele so discontinued   - repeat Troponin 8/4 x 1 - negative  - TTE: Normal LVSF, EF 65-70; No right heart strain; Mild Pul HTN; mod enlarged LA  - started on low dose bb   - CTA chest w/ IV contrast - neg for PE  - cardiology was consulted on admission but has not been re-consulted    BRANT - resolved  - likely secondary to dehydration with associated hyponatremia   -Improving renal fxn to wnl  -improving sodium to 134  - toleraing PO intake, D/c IVF  - monitor renal function post IV contrast   - will c/w monitoring renal fxn and sodium closely     B/l groin intertriginous dermatitis  -C/w Nystatin powder bid  -Continue to monitor for signs of infection     Hypocalcemia  -Ca 7.7  -Corrected Ca 9.4  -Continue to monitor     Acute on Chronic Anemia   - noted AOCD   - h/h remains stable   -Initial Hb 8.3, downtrended to 6.4   - likely bc pt was initially dehydrated   -s/p 2U PRBCs on admision and 1 u prbc intra op   - appropriate response to prbc transfusion   - FOBT pending, no evidence of bleeding   -Iron panel suggest AOCD   - will monitor hgb and transfuse for hgb <7   -C/w iron supplement, - c/w vitamin c     IDDM-2 poorly controlled  -HbA1c 7  - fs stable  - c/w accuchecks ACHS TID  - c/w HSS  - c/w hypoglycemia protocol     HLD  -C/W Lipitor PO QHS     H/o polysubstance use disorder - currently taking Suboxone  -Utox negative  -Last dose of Suboxone was day prior to hospitalization   -currently pt requiring narcotics for pain control   - labile emotions - psych reconsult for signs of depression  -Psych consult noted and appreciated, will reconsult when pt is off narcotics or defer to outpt management     Preventative Measures  DVT ppx: started Lovenox 40 - benefits outweigh risk. s/p BKA, immobile, sedentary, platelets stable, renal functions wnl.    Advanced Directive: Full code  Next of kin: Brother Jose    Dispo: Pt s/p Right BKA 8/1 and revision to R AKA on 8/7. Likely d/c to [facility]. Pt eval noted. Pending OT and PM&R consults. 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 Pt admitted with severe sepsis secondary to wet gangrene of RLE, complicated by maggot infestation of wound. Vascular sx consulted and pt s/p wound cleansing and went to the OR for emergent source control and s/p  Guillotine R BKA. Post operatively day #1 remains stable. Sepsis resolved post sx. Empirically on vanco/ zosyn, Bcx negative. Hospital course complicated by acute on chronic anemia in the setting of AOCD, s/p 2 u prbc on admission and 1 u prbc intraop with appropriate response to hgb, remains stable thereafter. FOBT pending but no evidence of active bleeding. Also noted with BRANT/ hyponatremia likely due to dehydration/sepsis, s/p ivf fluid resuscitation with improving renal function and sodium. Slow clinical improvement. ID, Podiatry, Vascular, PM and Psych consults noted. PT consult noted, recommends acute rehab. OT and PM&R consults pending. PM re-consulted noted, pain regimen adjusted.     Severe sepsis 2/2 wet gangrene of the RLE with maggot infestation  - s/p emergent source control and s/p Guillotine BKA POD #7.  POD 1 s/p right AKA revision with proximal femoral nerve resection.  - no intra op complications   - currently with down trending leukocytosis to wnl   - elevated esr/ crp   - afebrile  and normotensive - B cx negative X 2  - c/w Zosyn 3.375 g IV q8h D#7  - d/c Vanco (MRSA negative)   - pain control as per pain mx, no role for suboxone until off of acute narcotics   - c/w Dilaudid 2mg PO q4hrs PRN moderate pain  - c/w Dilaudid 4mg PO q4hrs PRN severe pain   - c/w Dilaudid 1mg IV 48hrs post op - d/c tomorrow 8/9   - c/w Robaxin 500mg PO q6hrs ATC  - c/w Gabapentin 300mg TID  - pt will not receive IV narcotics only sq given prior abuse   - c/w colace for prn constipation   - s/p tran post op   - c/w local wound care as per vascular sx   - Monitor BM - if worsening, will check for c.diff  - ID f/u noted and appreciated. Pt will c/w abx until 4 days s/p BKA revision - stop date 8/11   - vascular sx f/u  noted and appreciated.  POD 1 s/p right AKA revision with proximal femoral nerve resection. Amputee support team on board. Representatives from prosthetic devices at bedside this am.  - Podiatry consult noted and appreciated, C/w wound care   - Pain mx consult noted, avoid narcotics in IV form. Re-consulted s/p revision sx for possible adjustment of pain regimen.   - Psych consult noted and appreciated, will re consult due to concerns for depression and also when ready to transition pt to suboxone   -PT consult noted. Recommends acute rehab and also OT and PM&R consults.     Sinus tachycardia  -Persists. s/p IVF bolus. Will give IVF maintenance. Pt mentioned this am that he has h/o generalized anxiety/Panic attack. He is seen by PMD Dr. Pierce at [facility] ( [phone]). Called office, pt does not have a h/o generalized anxiety/Panic attack.   -If tachycardia persists will consult Cardiology.   - Pt denies CP, SOB. Repeat EKG done today sinus tachycardia. Unchanged from ekg on admission and 8/3  - On admission - sinus tachycardia w/ T wave inversion in leads V4-6 now with controlled HR initially likely due to sepsis   - admission Trop neg X2   - no evidence of any issues on tele so discontinued   - repeat Troponin 8/4 x 1 - negative  - TTE: Normal LVSF, EF 65-70; No right heart strain; Mild Pul HTN; mod enlarged LA  - started on low dose bb   - CTA chest w/ IV contrast - neg for PE    Depression  -Confirmed by PMD Dr. Pierce  -Pt takes Cymbalta 60mg BID    BRANT - resolved  - likely secondary to dehydration  -Improving renal fxn to wnl  -sodium now wnl  Tolerating PO intake  -Continue to monitor     B/l groin intertriginous dermatitis  -C/w Nystatin powder bid  -Continue to monitor for signs of infection     Hypocalcemia  -Ca 7.7  -Corrected Ca 9.4  -Continue to monitor     Acute on Chronic Anemia   - noted AOCD   - h/h remains stable   -Initial Hb 8.3, downtrended to 6.4   - likely bc pt was initially dehydrated   -s/p 2U PRBCs on admision and 1 u prbc intra op   - appropriate response to prbc transfusion   - FOBT pending, no evidence of bleeding   -Iron panel suggest AOCD   - will monitor hgb and transfuse for hgb <7   -C/w iron supplement, - c/w vitamin c     IDDM-2 poorly controlled  -HbA1c 7  - fs stable  - c/w accuchecks ACHS TID  - c/w HSS  - c/w hypoglycemia protocol     HLD  -C/W Lipitor PO QHS     H/o polysubstance use disorder - currently taking Suboxone  -Utox negative  -Last dose of Suboxone was day prior to hospitalization   -currently pt requiring narcotics for pain control   - labile emotions - psych reconsult for signs of depression  -Psych consult noted and appreciated, will reconsult when pt is off narcotics or defer to outpt management     Preventative Measures  DVT ppx: started Lovenox 40 - benefits outweigh risk. s/p BKA, immobile, sedentary, platelets stable, renal functions wnl.    Advanced Directive: Full code  Next of kin: Brother Jose    Dispo: Pt s/p Right BKA 8/1 and revision to R AKA on 8/7. Likely d/c to [facility]. Pt eval noted. Pending OT and PM&R consults.

## 2019-08-08 NOTE — PROGRESS NOTE ADULT - SUBJECTIVE AND OBJECTIVE BOX
Patient is a 54y old  Male who presents with a chief complaint of sepsis, foot wound (08 Aug 2019 08:57)  He is POD 1 right AKA. Per MAR, utilized only IV Dilaudid x5-6 doses in last 24 hrs.    VERBAL REPORT: "The medications work very well."    PAIN SCORE: 5/10  (VNRS)    MEDICATIONS  (STANDING):  acetaminophen   Tablet .. 975 milliGRAM(s) Oral every 8 hours  ascorbic acid 500 milliGRAM(s) Oral daily  atorvastatin 40 milliGRAM(s) Oral at bedtime  dextrose 5%. 1000 milliLiter(s) (50 mL/Hr) IV Continuous <Continuous>  dextrose 50% Injectable 12.5 Gram(s) IV Push once  dextrose 50% Injectable 25 Gram(s) IV Push once  dextrose 50% Injectable 25 Gram(s) IV Push once  DULoxetine 20 milliGRAM(s) Oral two times a day  enoxaparin Injectable 40 milliGRAM(s) SubCutaneous daily  ferrous    sulfate 325 milliGRAM(s) Oral four times a day  folic acid 1 milliGRAM(s) Oral daily  gabapentin 300 milliGRAM(s) Oral three times a day  insulin lispro (HumaLOG) corrective regimen sliding scale   SubCutaneous three times a day before meals  methocarbamol 750 milliGRAM(s) Oral every 8 hours  metoprolol tartrate 25 milliGRAM(s) Oral three times a day  multivitamin 1 Tablet(s) Oral daily  pantoprazole    Tablet 40 milliGRAM(s) Oral before breakfast  piperacillin/tazobactam IVPB.. 3.375 Gram(s) IV Intermittent every 8 hours  saccharomyces boulardii 250 milliGRAM(s) Oral two times a day  silver sulfADIAZINE 1% Cream 1 Application(s) Topical daily  thiamine 100 milliGRAM(s) Oral daily    MEDICATIONS  (PRN):  aluminum hydroxide/magnesium hydroxide/simethicone Suspension 30 milliLiter(s) Oral every 6 hours PRN Dyspepsia  dextrose 40% Gel 15 Gram(s) Oral once PRN Blood Glucose LESS THAN 70 milliGRAM(s)/deciliter  glucagon  Injectable 1 milliGRAM(s) IntraMuscular once PRN Glucose LESS THAN 70 milligrams/deciliter  HYDROmorphone  Injectable 1 milliGRAM(s) IV Push every 3 hours PRN Severe Pain (7 - 10)  oxyCODONE    IR 5 milliGRAM(s) Oral every 3 hours PRN Mild Pain (1 - 3)  oxyCODONE    IR 10 milliGRAM(s) Oral every 3 hours PRN Moderate Pain (4 - 6)  zolpidem 5 milliGRAM(s) Oral at bedtime PRN Insomnia      PHYSICAL EXAM:  Lightly asleep, comfortably, supine in bed. Awakens to verbal stimuli. Presently no evidence of oversedation or intense pain.    T(F): , Max: 98.1 (15:52)  HR:  (103 - 125)  BP:  (107/64 - 162/95)  RR:  (18 - 20)  SpO2:  (92% - 93%)      Pain Service  Text page via [employer]  PIN: [phone]

## 2019-08-08 NOTE — CHART NOTE - NSCHARTNOTEFT_GEN_A_CORE
Pt was noted to have sustained tachycardia in the 130s this evening. Tachycardia persisted despite IVF bolus X1. Pt was seen an examined at bedside. Pt was in no acute distress and pain was controlled. On PE: Tachycardia, no m/r/g, Lungs CTA b/l     Repeat EKG done showed sinus tachycardia at rate 126.    -Will continue with maintenance fluids.  -Lopressor 5mg IVP once for HR >120, to be given if tachycardia persists.  -If tachycardia persists throughout admission, will consult Cardio for eval

## 2019-08-08 NOTE — PROGRESS NOTE ADULT - ASSESSMENT
Assessment:    Mr Weller is POD 1 s/p right AKA with proximal femoral nerve resection; feeling very well this morning; patient's pain is very well-controlled.     Plan:   •  DVT prophylaxis as prescribed, including use of compression devices and ankle pumps to the left LE   ·	Abx per ID recommendations - 4 days after revision; will continue vanc & zosyn  •  physical therapy evaluation pending  TODAY - we appreciate recommendations  •  Amputee support team on board thanks to Sherri; Sherri to follow for eventual fitting of   •  Elevate right AKA stump on two pillows  •  Pain control    •  Incentive spirometry encouraged  •  Discharge planning – anticipated discharge is  subacute rehabilitation vs  acute rehabilitation

## 2019-08-08 NOTE — PROGRESS NOTE ADULT - SUBJECTIVE AND OBJECTIVE BOX
INTERVAL HPI/OVERNIGHT EVENTS:   No acute events overnight  Yesterday morning patient underwent a right AKA; tolerated the procedure well    SUBJECTIVE:    Pain well controlled overnight; patient feeling well overall and willing to participate with Physical Therapy today in preparation for rehabilitation; tolerating diet and feeling very well      MEDICATIONS  (STANDING):  acetaminophen   Tablet .. 975 milliGRAM(s) Oral every 8 hours  ascorbic acid 500 milliGRAM(s) Oral daily  atorvastatin 40 milliGRAM(s) Oral at bedtime  dextrose 5%. 1000 milliLiter(s) (50 mL/Hr) IV Continuous <Continuous>  dextrose 50% Injectable 12.5 Gram(s) IV Push once  dextrose 50% Injectable 25 Gram(s) IV Push once  dextrose 50% Injectable 25 Gram(s) IV Push once  DULoxetine 20 milliGRAM(s) Oral two times a day  enoxaparin Injectable 40 milliGRAM(s) SubCutaneous daily  ferrous    sulfate 325 milliGRAM(s) Oral four times a day  folic acid 1 milliGRAM(s) Oral daily  gabapentin 300 milliGRAM(s) Oral three times a day  insulin lispro (HumaLOG) corrective regimen sliding scale   SubCutaneous three times a day before meals  methocarbamol 750 milliGRAM(s) Oral every 8 hours  metoprolol tartrate 25 milliGRAM(s) Oral two times a day  multivitamin 1 Tablet(s) Oral daily  pantoprazole    Tablet 40 milliGRAM(s) Oral before breakfast  piperacillin/tazobactam IVPB.. 3.375 Gram(s) IV Intermittent every 8 hours  saccharomyces boulardii 250 milliGRAM(s) Oral two times a day  silver sulfADIAZINE 1% Cream 1 Application(s) Topical daily  thiamine 100 milliGRAM(s) Oral daily    MEDICATIONS  (PRN):  aluminum hydroxide/magnesium hydroxide/simethicone Suspension 30 milliLiter(s) Oral every 6 hours PRN Dyspepsia  dextrose 40% Gel 15 Gram(s) Oral once PRN Blood Glucose LESS THAN 70 milliGRAM(s)/deciliter  glucagon  Injectable 1 milliGRAM(s) IntraMuscular once PRN Glucose LESS THAN 70 milligrams/deciliter  HYDROmorphone  Injectable 1 milliGRAM(s) IV Push every 3 hours PRN Severe Pain (7 - 10)  oxyCODONE    IR 5 milliGRAM(s) Oral every 3 hours PRN Mild Pain (1 - 3)  oxyCODONE    IR 10 milliGRAM(s) Oral every 3 hours PRN Moderate Pain (4 - 6)  zolpidem 5 milliGRAM(s) Oral at bedtime PRN Insomnia      Vital Signs Last 24 Hrs  T(C): 36.7 (08 Aug 2019 00:24), Max: 37.3 (07 Aug 2019 10:52)  T(F): 98.1 (08 Aug 2019 00:24), Max: 99.1 (07 Aug 2019 10:52)  HR: 125 (08 Aug 2019 04:15) (99 - 125)  BP: 162/95 (08 Aug 2019 04:15) (107/64 - 162/95)  RR: 18 (08 Aug 2019 00:24) (14 - 24)  SpO2: 93% (08 Aug 2019 00:24) (92% - 100%)    Physical Exam:   WN AA  male in NAD lying in bed  Non labored breathing on RA  Regular rate and rhythm   Right AKA with clean dressing intact, covered with iodoform.  Right thigh is soft and compressible.  Left TMA with clean ace wrap in place.  Abdomen is soft, distended, non tender.       I&O's Detail    07 Aug 2019 07:01  -  08 Aug 2019 07:00  --------------------------------------------------------  IN:    lactated ringers.: 125 mL    Oral Fluid: 1200 mL    Other: 2000 mL    Packed Red Blood Cells: 700 mL    Solution: 200 mL  Total IN: 4225 mL    OUT:    Estimated Blood Loss: 50 mL    Voided: 1500 mL  Total OUT: 1550 mL    Total NET: 2675 mL          LABS:                        7.4    6.39  )-----------( 361      ( 07 Aug 2019 07:29 )             25.9     08-07    138  |  107  |  16.0  ----------------------------<  170<H>  4.1   |  23.0  |  0.64    Ca    7.7<L>      07 Aug 2019 07:29  Phos  2.5     08-07  Mg     1.7     08-07            RADIOLOGY & ADDITIONAL STUDIES: No addl studies

## 2019-08-08 NOTE — PROGRESS NOTE ADULT - ASSESSMENT
54M hx HTN, HLD, DM2, ETOH abuse, IVDU (on suboxone), multiple toe/foot amputations presents with RLE foot ulcer and pain x 5 months.  In ED temp of 99.7, heart rate 150 (improved to 126 s/p ivf bolus), bp 86/54 (improved to 97/54), RR- 22 saturating 95% on room air.   Labs initially WBC 20, Hb 8, Na 123, K 7, Cr 2    Overall, severe sepsis secondary to wet gangrene, maggot infestation of wound, s/p BRANT, leukocytosis, anemia, uncontrolled diabetes  s/p Right BKA 8/1/19 . Afebrile, BP improved, leukocytosis and BRANT resolved.    s/p right AKA 8/7/19  - Continue Antibiotics:  piperacillin/tazobactam IVPB.. 3.375 Gram(s) IV Intermittent every 8 hours  - Limit this to a 4 day course post-op  END Date 7/11/19    D/C isolation for Wound maggots      Will Follow 54M hx HTN, HLD, DM2, ETOH abuse, IVDU (on suboxone), multiple toe/foot amputations presents with RLE foot ulcer and pain x 5 months.  In ED temp of 99.7, heart rate 150 (improved to 126 s/p ivf bolus), bp 86/54 (improved to 97/54), RR- 22 saturating 95% on room air.   Labs initially WBC 20, Hb 8, Na 123, K 7, Cr 2    Overall, severe sepsis secondary to wet gangrene, maggot infestation of wound, s/p BRANT, leukocytosis, anemia, uncontrolled diabetes  s/p Right BKA 8/1/19 . Afebrile, BP improved, leukocytosis and BRANT resolved.    s/p right AKA 8/7/19  - Continue Antibiotics:  piperacillin/tazobactam IVPB.. 3.375 Gram(s) IV Intermittent every 8 hours  - Limit this to a 4 day course post-op  END Date 8/11/19    D/C isolation for Wound maggots      Will Follow

## 2019-08-08 NOTE — PROGRESS NOTE ADULT - SUBJECTIVE AND OBJECTIVE BOX
[facility]  INFECTIOUS DISEASES AND INTERNAL MEDICINE at [place]  =======================================================  Rupesh Santa MD  Diplomates American Board of Internal Medicine and Infectious Diseases  Tel: [phone]      Fax: [phone]  =======================================================    BREANNA MENDOZA [id number]    Follow up: maggot infestation of wound; RIGHT BKA stump infection  s/p AKA on 8/7/19  pt c/o pain in stump this AM    Allergies:  Allergy Status Unknown    Antibiotics:  piperacillin/tazobactam IVPB.. 3.375 Gram(s) IV Intermittent every 8 hours      REVIEW OF SYSTEMS:  as above    Physical Exam:  T(F): 98 (08 Aug 2019 08:19), Max: 99.1 (07 Aug 2019 10:52)  HR: 105 (08 Aug 2019 08:19)  BP: 150/93 (08 Aug 2019 08:19)  RR: 20 (08 Aug 2019 08:19)  SpO2: 93% (08 Aug 2019 00:24) (92% - 100%)  temp max in last 48H T(F): , Max: 99.1 (08-07-19 @ 10:52)    GEN: NAD, pleasant  HEENT: normocephalic and atraumatic. EOMI. PERRL.  Anicteric   NECK: Supple.   LUNGS: Clear to auscultation.  HEART: Regular rate and rhythm  ABDOMEN: Soft, nontender, and nondistended.  Positive bowel sounds.    : No CVA tenderness  EXTREMITIES: Without any edema.  MSK: no joint swelling  NEUROLOGIC: No focal deficits  PSYCHIATRIC: Appropriate affect .  SKIN:  right AKA, stump with dressing in place    ===========  Labs:                        8.6    10.41 )-----------( 457      ( 08 Aug 2019 07:33 )             29.4       WBC Count: 10.41 K/uL (08-08-19 @ 07:33)  WBC Count: 6.39 K/uL (08-07-19 @ 07:29)  WBC Count: 6.99 K/uL (08-06-19 @ 07:59)  WBC Count: 7.72 K/uL (08-05-19 @ 07:03)  WBC Count: 8.09 K/uL (08-04-19 @ 08:30)  WBC Count: 9.26 K/uL (08-03-19 @ 12:31)      08-08    136  |  104  |  21.0<H>  ----------------------------<  230<H>  4.0   |  22.0  |  0.89    Ca    7.7<L>      08 Aug 2019 07:33  Phos  2.5     08-07  Mg     1.7     08-07        Culture - Urine (collected 08-01-19 @ 05:26)  Source: .Urine  Final Report (08-02-19 @ 10:09):    No growth    Culture - Blood (collected 07-31-19 @ 19:34)  Source: .Blood  Final Report (08-05-19 @ 21:00):    No growth at 5 days.    Culture - Blood (collected 07-31-19 @ 19:34)  Source: .Blood  Final Report (08-05-19 @ 21:00):    No growth at 5 days.

## 2019-08-08 NOTE — PROGRESS NOTE ADULT - ASSESSMENT
55 y/o M, POD #1 right AKA. Hx: opioid and ETOH abuse, previously on Suboxone. Last picked up 04/17/19. He was found comfortably resting, in NAD. Discussed transition to an oral regimen, with return to Suboxone prior to facility discharge. Patient verbalizes understanding.

## 2019-08-08 NOTE — PHYSICAL THERAPY INITIAL EVALUATION ADULT - PERTINENT HX OF CURRENT PROBLEM, REHAB EVAL
Severe sepsis 2/2 wet gangrene of the RLE with maggot infestation s/p right BKA 8/1, s/p right AKA 8/7 with proximal femoral nerve resection, history of left TMA

## 2019-08-09 LAB
ANION GAP SERPL CALC-SCNC: 10 MMOL/L — SIGNIFICANT CHANGE UP (ref 5–17)
BASOPHILS # BLD AUTO: 0.08 K/UL — SIGNIFICANT CHANGE UP (ref 0–0.2)
BASOPHILS NFR BLD AUTO: 1 % — SIGNIFICANT CHANGE UP (ref 0–2)
BUN SERPL-MCNC: 18 MG/DL — SIGNIFICANT CHANGE UP (ref 8–20)
CALCIUM SERPL-MCNC: 8.1 MG/DL — LOW (ref 8.6–10.2)
CHLORIDE SERPL-SCNC: 105 MMOL/L — SIGNIFICANT CHANGE UP (ref 98–107)
CO2 SERPL-SCNC: 24 MMOL/L — SIGNIFICANT CHANGE UP (ref 22–29)
CREAT SERPL-MCNC: 0.68 MG/DL — SIGNIFICANT CHANGE UP (ref 0.5–1.3)
EOSINOPHIL # BLD AUTO: 0.35 K/UL — SIGNIFICANT CHANGE UP (ref 0–0.5)
EOSINOPHIL NFR BLD AUTO: 4.5 % — SIGNIFICANT CHANGE UP (ref 0–6)
GLUCOSE BLDC GLUCOMTR-MCNC: 175 MG/DL — HIGH (ref 70–99)
GLUCOSE BLDC GLUCOMTR-MCNC: 242 MG/DL — HIGH (ref 70–99)
GLUCOSE BLDC GLUCOMTR-MCNC: 243 MG/DL — HIGH (ref 70–99)
GLUCOSE BLDC GLUCOMTR-MCNC: 253 MG/DL — HIGH (ref 70–99)
GLUCOSE SERPL-MCNC: 198 MG/DL — HIGH (ref 70–115)
HCT VFR BLD CALC: 28 % — LOW (ref 39–50)
HGB BLD-MCNC: 8.2 G/DL — LOW (ref 13–17)
IMM GRANULOCYTES NFR BLD AUTO: 0.8 % — SIGNIFICANT CHANGE UP (ref 0–1.5)
LYMPHOCYTES # BLD AUTO: 1.18 K/UL — SIGNIFICANT CHANGE UP (ref 1–3.3)
LYMPHOCYTES # BLD AUTO: 15 % — SIGNIFICANT CHANGE UP (ref 13–44)
MCHC RBC-ENTMCNC: 26.1 PG — LOW (ref 27–34)
MCHC RBC-ENTMCNC: 29.3 GM/DL — LOW (ref 32–36)
MCV RBC AUTO: 89.2 FL — SIGNIFICANT CHANGE UP (ref 80–100)
MONOCYTES # BLD AUTO: 0.65 K/UL — SIGNIFICANT CHANGE UP (ref 0–0.9)
MONOCYTES NFR BLD AUTO: 8.3 % — SIGNIFICANT CHANGE UP (ref 2–14)
NEUTROPHILS # BLD AUTO: 5.54 K/UL — SIGNIFICANT CHANGE UP (ref 1.8–7.4)
NEUTROPHILS NFR BLD AUTO: 70.4 % — SIGNIFICANT CHANGE UP (ref 43–77)
PLATELET # BLD AUTO: 438 K/UL — HIGH (ref 150–400)
POTASSIUM SERPL-MCNC: 4.1 MMOL/L — SIGNIFICANT CHANGE UP (ref 3.5–5.3)
POTASSIUM SERPL-SCNC: 4.1 MMOL/L — SIGNIFICANT CHANGE UP (ref 3.5–5.3)
RBC # BLD: 3.14 M/UL — LOW (ref 4.2–5.8)
RBC # FLD: 17.3 % — HIGH (ref 10.3–14.5)
SODIUM SERPL-SCNC: 139 MMOL/L — SIGNIFICANT CHANGE UP (ref 135–145)
WBC # BLD: 7.86 K/UL — SIGNIFICANT CHANGE UP (ref 3.8–10.5)
WBC # FLD AUTO: 7.86 K/UL — SIGNIFICANT CHANGE UP (ref 3.8–10.5)

## 2019-08-09 PROCEDURE — 88307 TISSUE EXAM BY PATHOLOGIST: CPT | Mod: 26

## 2019-08-09 PROCEDURE — 99223 1ST HOSP IP/OBS HIGH 75: CPT | Mod: GC

## 2019-08-09 PROCEDURE — 99232 SBSQ HOSP IP/OBS MODERATE 35: CPT | Mod: GC

## 2019-08-09 PROCEDURE — 99232 SBSQ HOSP IP/OBS MODERATE 35: CPT

## 2019-08-09 RX ORDER — METOPROLOL TARTRATE 50 MG
50 TABLET ORAL
Refills: 0 | Status: DISCONTINUED | OUTPATIENT
Start: 2019-08-09 | End: 2019-08-13

## 2019-08-09 RX ORDER — HYDROMORPHONE HYDROCHLORIDE 2 MG/ML
0.5 INJECTION INTRAMUSCULAR; INTRAVENOUS; SUBCUTANEOUS EVERY 6 HOURS
Refills: 0 | Status: DISCONTINUED | OUTPATIENT
Start: 2019-08-09 | End: 2019-08-11

## 2019-08-09 RX ADMIN — HYDROMORPHONE HYDROCHLORIDE 1 MILLIGRAM(S): 2 INJECTION INTRAMUSCULAR; INTRAVENOUS; SUBCUTANEOUS at 08:54

## 2019-08-09 RX ADMIN — Medication 975 MILLIGRAM(S): at 14:08

## 2019-08-09 RX ADMIN — Medication 50 MILLIGRAM(S): at 17:19

## 2019-08-09 RX ADMIN — HYDROMORPHONE HYDROCHLORIDE 1 MILLIGRAM(S): 2 INJECTION INTRAMUSCULAR; INTRAVENOUS; SUBCUTANEOUS at 06:17

## 2019-08-09 RX ADMIN — Medication 25 MILLIGRAM(S): at 05:44

## 2019-08-09 RX ADMIN — Medication 325 MILLIGRAM(S): at 05:44

## 2019-08-09 RX ADMIN — Medication 975 MILLIGRAM(S): at 05:44

## 2019-08-09 RX ADMIN — Medication 1 MILLIGRAM(S): at 12:24

## 2019-08-09 RX ADMIN — Medication 100 MILLIGRAM(S): at 12:24

## 2019-08-09 RX ADMIN — HYDROMORPHONE HYDROCHLORIDE 0.5 MILLIGRAM(S): 2 INJECTION INTRAMUSCULAR; INTRAVENOUS; SUBCUTANEOUS at 17:19

## 2019-08-09 RX ADMIN — Medication 975 MILLIGRAM(S): at 06:20

## 2019-08-09 RX ADMIN — Medication 325 MILLIGRAM(S): at 12:24

## 2019-08-09 RX ADMIN — HYDROMORPHONE HYDROCHLORIDE 4 MILLIGRAM(S): 2 INJECTION INTRAMUSCULAR; INTRAVENOUS; SUBCUTANEOUS at 21:57

## 2019-08-09 RX ADMIN — Medication 2: at 17:19

## 2019-08-09 RX ADMIN — HYDROMORPHONE HYDROCHLORIDE 0.5 MILLIGRAM(S): 2 INJECTION INTRAMUSCULAR; INTRAVENOUS; SUBCUTANEOUS at 17:30

## 2019-08-09 RX ADMIN — METHOCARBAMOL 750 MILLIGRAM(S): 500 TABLET, FILM COATED ORAL at 17:10

## 2019-08-09 RX ADMIN — Medication 325 MILLIGRAM(S): at 17:08

## 2019-08-09 RX ADMIN — Medication 1 TABLET(S): at 12:24

## 2019-08-09 RX ADMIN — GABAPENTIN 300 MILLIGRAM(S): 400 CAPSULE ORAL at 22:28

## 2019-08-09 RX ADMIN — HYDROMORPHONE HYDROCHLORIDE 1 MILLIGRAM(S): 2 INJECTION INTRAMUSCULAR; INTRAVENOUS; SUBCUTANEOUS at 00:05

## 2019-08-09 RX ADMIN — Medication 975 MILLIGRAM(S): at 14:38

## 2019-08-09 RX ADMIN — PIPERACILLIN AND TAZOBACTAM 25 GRAM(S): 4; .5 INJECTION, POWDER, LYOPHILIZED, FOR SOLUTION INTRAVENOUS at 05:44

## 2019-08-09 RX ADMIN — HYDROMORPHONE HYDROCHLORIDE 1 MILLIGRAM(S): 2 INJECTION INTRAMUSCULAR; INTRAVENOUS; SUBCUTANEOUS at 09:15

## 2019-08-09 RX ADMIN — Medication 1 APPLICATION(S): at 12:24

## 2019-08-09 RX ADMIN — METHOCARBAMOL 750 MILLIGRAM(S): 500 TABLET, FILM COATED ORAL at 22:18

## 2019-08-09 RX ADMIN — GABAPENTIN 300 MILLIGRAM(S): 400 CAPSULE ORAL at 05:44

## 2019-08-09 RX ADMIN — HYDROMORPHONE HYDROCHLORIDE 4 MILLIGRAM(S): 2 INJECTION INTRAMUSCULAR; INTRAVENOUS; SUBCUTANEOUS at 14:35

## 2019-08-09 RX ADMIN — Medication 975 MILLIGRAM(S): at 22:18

## 2019-08-09 RX ADMIN — Medication 500 MILLIGRAM(S): at 12:24

## 2019-08-09 RX ADMIN — Medication 1: at 08:32

## 2019-08-09 RX ADMIN — METHOCARBAMOL 750 MILLIGRAM(S): 500 TABLET, FILM COATED ORAL at 05:44

## 2019-08-09 RX ADMIN — GABAPENTIN 300 MILLIGRAM(S): 400 CAPSULE ORAL at 14:07

## 2019-08-09 RX ADMIN — DULOXETINE HYDROCHLORIDE 60 MILLIGRAM(S): 30 CAPSULE, DELAYED RELEASE ORAL at 17:08

## 2019-08-09 RX ADMIN — PIPERACILLIN AND TAZOBACTAM 25 GRAM(S): 4; .5 INJECTION, POWDER, LYOPHILIZED, FOR SOLUTION INTRAVENOUS at 22:28

## 2019-08-09 RX ADMIN — ZOLPIDEM TARTRATE 5 MILLIGRAM(S): 10 TABLET ORAL at 22:21

## 2019-08-09 RX ADMIN — ATORVASTATIN CALCIUM 40 MILLIGRAM(S): 80 TABLET, FILM COATED ORAL at 22:20

## 2019-08-09 RX ADMIN — PANTOPRAZOLE SODIUM 40 MILLIGRAM(S): 20 TABLET, DELAYED RELEASE ORAL at 05:44

## 2019-08-09 RX ADMIN — Medication 975 MILLIGRAM(S): at 23:12

## 2019-08-09 RX ADMIN — ENOXAPARIN SODIUM 40 MILLIGRAM(S): 100 INJECTION SUBCUTANEOUS at 12:24

## 2019-08-09 RX ADMIN — Medication 250 MILLIGRAM(S): at 17:11

## 2019-08-09 RX ADMIN — HYDROMORPHONE HYDROCHLORIDE 4 MILLIGRAM(S): 2 INJECTION INTRAMUSCULAR; INTRAVENOUS; SUBCUTANEOUS at 14:07

## 2019-08-09 RX ADMIN — HYDROMORPHONE HYDROCHLORIDE 1 MILLIGRAM(S): 2 INJECTION INTRAMUSCULAR; INTRAVENOUS; SUBCUTANEOUS at 05:48

## 2019-08-09 RX ADMIN — Medication 250 MILLIGRAM(S): at 05:44

## 2019-08-09 RX ADMIN — PIPERACILLIN AND TAZOBACTAM 25 GRAM(S): 4; .5 INJECTION, POWDER, LYOPHILIZED, FOR SOLUTION INTRAVENOUS at 17:07

## 2019-08-09 RX ADMIN — Medication 2: at 12:23

## 2019-08-09 RX ADMIN — HYDROMORPHONE HYDROCHLORIDE 0.5 MILLIGRAM(S): 2 INJECTION INTRAMUSCULAR; INTRAVENOUS; SUBCUTANEOUS at 01:20

## 2019-08-09 RX ADMIN — HYDROMORPHONE HYDROCHLORIDE 4 MILLIGRAM(S): 2 INJECTION INTRAMUSCULAR; INTRAVENOUS; SUBCUTANEOUS at 20:57

## 2019-08-09 RX ADMIN — DULOXETINE HYDROCHLORIDE 60 MILLIGRAM(S): 30 CAPSULE, DELAYED RELEASE ORAL at 05:44

## 2019-08-09 NOTE — PROGRESS NOTE ADULT - SUBJECTIVE AND OBJECTIVE BOX
Patient is a 54y old  Male who presents with a chief complaint of sepsis, foot wound (01 Aug 2019 13:18) s/p BKA controlling pain     Overnight AM events:  Patient seen and examined at beside. No acute events over night. POD #8 s/p Right BKA and POD 2 s/p right AKA revision with proximal femoral nerve resection. Pain is well controlled. When pain is present, he c/o of shooting pain, likely phantom pain. Pt is tolerating PO diet w/o n/v/diarrhea. Pt now with mild tachycardia. EKG done yesterday showed sinus tach at 126.      ROS: Denies CP, HA, SOB, n/v/, fever/chills, abdominal/back pain, vision changes, numbness/tingling, blood in urine or stool     CAPILLARY BLOOD GLUCOSE  POCT  Blood Glucose: 175 mg/dL (08.07.19 @ 21:13)  POCT  Blood Glucose: 273 mg/dL (08.08.19 @ 21:52)  POCT  Blood Glucose: 219 mg/dL (08.08.19 @ 17:04)    Physical Examination  Vital Signs Last 24 Hrs  T(C): 36.9 (08 Aug 2019 23:19), Max: 37.1 (08 Aug 2019 15:22)  T(F): 98.5 (08 Aug 2019 23:19), Max: 98.7 (08 Aug 2019 15:22)  HR: 103 (09 Aug 2019 05:19) (103 - 137)  BP: 164/99 (09 Aug 2019 05:19) (141/88 - 164/99)  RR: 20 (08 Aug 2019 23:19) (18 - 20)  SpO2: 97% (08 Aug 2019 23:19) (96% - 97%)    GENERAL: Obese, WD WN, poor hygiene   HEENT: PERRLA, pupil constricted to light b/l, poor dentition   RESP: CTA b/l,, no crackles, no wheezing or rhonchi   CVS: RRR, Normal S1 & S2, No m/r/g  Abdomen: soft +BS normoactive, NT, ND, b/l groin intertriginous dermatitis - no signs of infection   Extremities: Rt foot s/p BKA and R AKA, L foot - superficial ulcer 4x5cm, no active drainage, s/p partial amputation of foot clean dry no evidence of infection   Neuro: AAOX3, non focal     LAB                        8.2    7.86  )-----------( 438      ( 09 Aug 2019 07:52 )             28.0     08-08    136  |  104  |  21.0<H>  ----------------------------<  230<H>  4.0   |  22.0  |  0.89    Ca    7.7<L>      08 Aug 2019 07:33    Phos  2.8     08-05  Mg     1.3     08-05    TPro  6.2<L>  /  Alb  1.9<L>  /  TBili  0.4  /  DBili  x   /  AST  27  /  ALT  18  /  AlkPhos  774<H>  08-05      IMAGING  < from: TTE Echo Complete w/Doppler (08.03.19 @ 13:18) >  Summary:   1. Technically limited study.   2. Normal left ventricular internal cavity size.   3. Normal global left ventricular systolic function.   4. Left ventricular ejection fraction, by visual estimation, is 65 to   70%.   5. The left ventricular diastolic function could not be assessed in this   study.   6. Normal right ventricular size and function.   7. Moderately enlarged left atrium.   8. Normal trileaflet aortic valve with normal opening.   9. Estimated pulmonary artery systolic pressure is 45.4 mmHg assuming a   right atrial pressure of 5 mmHg, which is consistent with mild pulmonary   hypertension.  10. There is no evidence of pericardial effusion.    < end of copied text >      < from: Xray Ankle Complete 3 Views, Right (07.31.19 @ 22:25) >  IMPRESSION:   Soft tissue swelling, ulcerations consistent with diffuse   osteomyelitis involving the phalanges and metatarsal bones tarsal bones   possibly ankle joint/show bilateral medial malleolus.    < end of copied text >    < from: Xray Foot AP + Lateral + Oblique, Right (07.31.19 @ 22:25) >  MPRESSION:   Soft tissue swelling, ulcerations consistent with diffuse   osteomyelitis involving the phalanges and metatarsal bones tarsal bones   possibly ankle joint/show bilateral medial malleolus.    < end of copied text >    < from: Xray Chest 1 View-PORTABLE IMMEDIATE (07.31.19 @ 20:04) >  Impression:  No evidence of acute cardiopulmonary disease. Allowing for differences in   technique, no significant change since 5/11/2016..    < end of copied text >        MEDICATIONS  (STANDING):  ascorbic acid 500 milliGRAM(s) Oral daily  dextrose 5%. 1000 milliLiter(s) (50 mL/Hr) IV Continuous <Continuous>  dextrose 50% Injectable 12.5 Gram(s) IV Push once  dextrose 50% Injectable 25 Gram(s) IV Push once  dextrose 50% Injectable 25 Gram(s) IV Push once  enoxaparin Injectable 40 milliGRAM(s) SubCutaneous daily  ferrous    sulfate 325 milliGRAM(s) Oral four times a day  folic acid 1 milliGRAM(s) Oral daily  gabapentin 300 milliGRAM(s) Oral two times a day  insulin lispro (HumaLOG) corrective regimen sliding scale   SubCutaneous three times a day before meals  metoprolol tartrate 25 milliGRAM(s) Oral two times a day  multivitamin 1 Tablet(s) Oral daily  pantoprazole    Tablet 40 milliGRAM(s) Oral before breakfast  piperacillin/tazobactam IVPB.. 3.375 Gram(s) IV Intermittent every 8 hours  potassium chloride    Tablet ER 40 milliEquivalent(s) Oral every 4 hours  saccharomyces boulardii 250 milliGRAM(s) Oral two times a day  silver sulfADIAZINE 1% Cream 1 Application(s) Topical daily  sodium chloride 0.9%. 1000 milliLiter(s) (75 mL/Hr) IV Continuous <Continuous>  thiamine 100 milliGRAM(s) Oral daily  vancomycin  IVPB 750 milliGRAM(s) IV Intermittent every 12 hours    MEDICATIONS  (PRN):  aluminum hydroxide/magnesium hydroxide/simethicone Suspension 30 milliLiter(s) Oral every 6 hours PRN Dyspepsia  dextrose 40% Gel 15 Gram(s) Oral once PRN Blood Glucose LESS THAN 70 milliGRAM(s)/deciliter  glucagon  Injectable 1 milliGRAM(s) IntraMuscular once PRN Glucose LESS THAN 70 milligrams/deciliter  HYDROmorphone   Tablet 4 milliGRAM(s) Oral every 3 hours PRN Moderate Pain (4 - 6)  HYDROmorphone   Tablet 6 milliGRAM(s) Oral every 6 hours PRN Severe Pain (7 - 10)  HYDROmorphone  Injectable 1 milliGRAM(s) SubCutaneous every 6 hours PRN Severe Pain persisting 1 hour after oral opioids  ketorolac 10 milliGRAM(s) Oral every 8 hours PRN Mild Pain (1 - 3)

## 2019-08-09 NOTE — OCCUPATIONAL THERAPY INITIAL EVALUATION ADULT - PERSONAL SAFETY AND JUDGMENT, REHAB EVAL
intact/no unsafe behaviors demonstrated during evaluation; continue to assess as pt lethargic/fatigued during evaluation

## 2019-08-09 NOTE — PROGRESS NOTE ADULT - ASSESSMENT
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 Pt admitted with severe sepsis secondary to wet gangrene of RLE, complicated by maggot infestation of wound. Vascular sx consulted and pt s/p wound cleansing and went to the OR for emergent source control and s/p  Guillotine R BKA. Post operatively day #1 remains stable. Sepsis resolved post sx. Empirically on vanco/ zosyn, Bcx negative. Hospital course complicated by acute on chronic anemia in the setting of AOCD, s/p 2 u prbc on admission and 1 u prbc intraop with appropriate response to hgb, remains stable thereafter. FOBT pending but no evidence of active bleeding. Also noted with BRANT/ hyponatremia likely due to dehydration/sepsis, s/p ivf fluid resuscitation with improving renal function and sodium. Slow clinical improvement. ID, Podiatry, Vascular, PM and Psych consults noted. PT consult noted, recommends acute rehab. OT and PM&R consults pending. PM consulted noted, pain regimen adjusted s/p revision sx.     Severe sepsis 2/2 wet gangrene of the RLE with maggot infestation  - s/p emergent source control and s/p Guillotine BKA POD #8.  POD 2 s/p right AKA revision with proximal femoral nerve resection.  - no intra op complications   - currently with down trending leukocytosis to wnl   - elevated esr/ crp   - afebrile  and normotensive - B cx negative X 2  - c/w Zosyn 3.375 g IV q8h D#9  - d/c Vanco (MRSA negative)   - pain control as per pain mx, no role for suboxone until off of acute narcotics   - c/w Dilaudid 2mg PO q4hrs PRN moderate pain  - c/w Dilaudid 4mg PO q4hrs PRN severe pain   - c/w Dilaudid 1mg IV 48hrs post op - d/c tomorrow 8/9   - c/w Robaxin 500mg PO q6hrs ATC  - c/w Gabapentin 300mg TID  - pt will not receive IV narcotics only sq given prior abuse   - c/w colace for prn constipation   - s/p tran post op   - c/w local wound care as per vascular sx   - Monitor BM - if worsening, will check for c.diff  - ID f/u noted and appreciated. Pt will c/w abx until 4 days s/p BKA revision - stop date 8/11   - vascular sx f/u  noted and appreciated.  POD 1 s/p right AKA revision with proximal femoral nerve resection. Amputee support team on board. Representatives from prosthetic devices at bedside this am.  - Podiatry consult noted and appreciated, C/w wound care   - Pain mx consult noted, avoid narcotics in IV form. Re-consulted s/p revision sx for possible adjustment of pain regimen.   - Psych consult noted and appreciated, will re consult due to concerns for depression and also when ready to transition pt to suboxone   -PT consult noted. Recommends acute rehab and also OT and PM&R consults.     Sinus tachycardia  -Stable now. If persists will consult Cardiology  - Pt denies CP, SOB. Repeat EKG done today sinus tachycardia. Unchanged from ekg on admission and 8/3  - On admission - sinus tachycardia w/ T wave inversion in leads V4-6 now with controlled HR initially likely due to sepsis   - admission Trop neg X2   - no evidence of any issues on tele so discontinued   - repeat Troponin 8/4 x 1 - negative  - TTE: Normal LVSF, EF 65-70; No right heart strain; Mild Pul HTN; mod enlarged LA  - started on low dose bb   - CTA chest w/ IV contrast - neg for PE    Depression  -Confirmed by PMD Dr. Pierce  -C/w Cymbalta 60mg BID    BRANT - resolved  - likely secondary to dehydration  -Improving renal fxn to wnl  -sodium now wnl  Tolerating PO intake  -Continue to monitor     B/l groin intertriginous dermatitis  -C/w Nystatin powder bid  -Continue to monitor for signs of infection     Hypocalcemia  -Ca 7.7  -Corrected Ca 9.4  -Continue to monitor     Acute on Chronic Anemia   - noted AOCD   - h/h remains stable   -Initial Hb 8.3, downtrended to 6.4   - likely bc pt was initially dehydrated   -s/p 2U PRBCs on admision and 1 u prbc intra op   - appropriate response to prbc transfusion   - FOBT pending, no evidence of bleeding   -Iron panel suggest AOCD   - will monitor hgb and transfuse for hgb <7   -C/w iron supplement, - c/w vitamin c     IDDM-2 poorly controlled  -HbA1c 7  - fs stable  - c/w accuchecks ACHS TID  - c/w HSS  - c/w hypoglycemia protocol     HLD  -C/W Lipitor PO QHS     H/o polysubstance use disorder - currently taking Suboxone  -Utox negative  -Last dose of Suboxone was day prior to hospitalization   -currently pt requiring narcotics for pain control   - labile emotions - psych reconsult for signs of depression  -Psych consult noted and appreciated, will reconsult when pt is off narcotics or defer to outpt management     Preventative Measures  DVT ppx: started Lovenox 40 - benefits outweigh risk. s/p BKA, immobile, sedentary, platelets stable, renal functions wnl.    Advanced Directive: Full code  Next of kin: Brother Jose    Dispo: Pt s/p Right BKA 8/1 and revision to R AKA on 8/7. Likely d/c to [facility]. Pt charly noted. Pending OT and PM&R consults. 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 Pt admitted with severe sepsis secondary to wet gangrene of RLE, complicated by maggot infestation of wound. Vascular sx consulted and pt s/p wound cleansing and went to the OR for emergent source control and s/p  Guillotine R BKA. Post operatively day #1 remains stable. Sepsis resolved post sx. Empirically on vanco/ zosyn, Bcx negative. Hospital course complicated by acute on chronic anemia in the setting of AOCD, s/p 2 u prbc on admission and 1 u prbc intraop with appropriate response to hgb, remains stable thereafter. FOBT pending but no evidence of active bleeding. Also noted with BRANT/ hyponatremia likely due to dehydration/sepsis, s/p ivf fluid resuscitation with improving renal function and sodium. Slow clinical improvement. ID, Podiatry, Vascular, PM and Psych consults noted. PT consult noted, recommends acute rehab. OT and PM&R consults pending. PM consulted noted, pain regimen adjusted s/p revision sx.     Severe sepsis 2/2 wet gangrene of the RLE with maggot infestation  - s/p emergent source control and s/p Guillotine BKA POD #8.  POD 2 s/p right AKA revision with proximal femoral nerve resection.  - no intra op complications   - currently with down trending leukocytosis to wnl   - elevated esr/ crp   - afebrile  and normotensive - B cx negative X 2  - c/w Zosyn 3.375 g IV q8h D#9  - d/c Vanco (MRSA negative)   - pain control as per pain mx, no role for suboxone until off of acute narcotics   - c/w Dilaudid 2mg PO q4hrs PRN moderate pain  - c/w Dilaudid 4mg PO q4hrs PRN severe pain   - c/w Dilaudid 1mg IV 48hrs post op - d/c tomorrow 8/9   - c/w Robaxin 500mg PO q6hrs ATC  - c/w Gabapentin 300mg TID  - pt will not receive IV narcotics only sq given prior abuse   - c/w colace for prn constipation   - s/p tran post op   - c/w local wound care as per vascular sx   - Monitor BM - if worsening, will check for c.diff  - ID f/u noted and appreciated. Pt will c/w abx until 4 days s/p BKA revision - stop date 8/11   - vascular sx f/u  noted and appreciated.  POD 1 s/p right AKA revision with proximal femoral nerve resection. Amputee support team on board. Representatives from prosthetic devices at bedside this am.  - Podiatry consult noted and appreciated, C/w wound care   - Pain mx consult noted, avoid narcotics in IV form. Re-consulted s/p revision sx for possible adjustment of pain regimen.   - Psych consult noted and appreciated, will re consult due to concerns for depression and also when ready to transition pt to suboxone   -PT consult noted. Recommends acute rehab and also OT and PM&R consults.     Sinus tachycardia  -Stable now. If persists will consult Cardiology. Repeat EKG sinus tachycardia. -Lopressor 5mg IVP once for HR >120, to be given if tachycardia persists  - Pt denies CP, SOB. Repeat EKG done today sinus tachycardia. Unchanged from ekg on admission and 8/3  - On admission - sinus tachycardia w/ T wave inversion in leads V4-6 now with controlled HR initially likely due to sepsis   - admission Trop neg X2   - no evidence of any issues on tele so discontinued   - repeat Troponin 8/4 x 1 - negative  - TTE: Normal LVSF, EF 65-70; No right heart strain; Mild Pul HTN; mod enlarged LA  - started on low dose bb   - CTA chest w/ IV contrast - neg for PE    Depression  -Confirmed by PMD Dr. Pierce  -C/w Cymbalta 60mg BID    BRANT - resolved  - likely secondary to dehydration  -Improving renal fxn to wnl  -sodium now wnl  Tolerating PO intake  -Continue to monitor     B/l groin intertriginous dermatitis  -C/w Nystatin powder bid  -Continue to monitor for signs of infection     Hypocalcemia  -Ca 7.7  -Corrected Ca 9.4  -Continue to monitor     Acute on Chronic Anemia   - noted AOCD   - h/h remains stable   -Initial Hb 8.3, downtrended to 6.4   - likely bc pt was initially dehydrated   -s/p 2U PRBCs on admision and 1 u prbc intra op   - appropriate response to prbc transfusion   - FOBT pending, no evidence of bleeding   -Iron panel suggest AOCD   - will monitor hgb and transfuse for hgb <7   -C/w iron supplement, - c/w vitamin c     IDDM-2 poorly controlled  -HbA1c 7  - fs stable  - c/w accuchecks ACHS TID  - c/w HSS  - c/w hypoglycemia protocol     HLD  -C/W Lipitor PO QHS     H/o polysubstance use disorder - currently taking Suboxone  -Utox negative  -Last dose of Suboxone was day prior to hospitalization   -currently pt requiring narcotics for pain control   - labile emotions - psych reconsult for signs of depression  -Psych consult noted and appreciated, will reconsult when pt is off narcotics or defer to outpt management     Preventative Measures  DVT ppx: started Lovenox 40 - benefits outweigh risk. s/p BKA, immobile, sedentary, platelets stable, renal functions wnl.    Advanced Directive: Full code  Next of kin: Brother Jose    Dispo: Pt s/p Right BKA 8/1 and revision to R AKA on 8/7. Likely d/c to [facility]. Pt eval noted. Pending OT and PM&R consults.

## 2019-08-09 NOTE — PROGRESS NOTE ADULT - SUBJECTIVE AND OBJECTIVE BOX
INTERVAL HPI/OVERNIGHT EVENTS:   Pt noted with tachycardia overnight. Asymptomatic    S/P right AKA  POD # 2    SUBJECTIVE:    Pain well controlled overnight; patient feeling well overall and willing to participate with Physical Therapy; eager for rehabilitation; tolerating diet       MEDICATIONS  (STANDING):  acetaminophen   Tablet .. 975 milliGRAM(s) Oral every 8 hours  ascorbic acid 500 milliGRAM(s) Oral daily  atorvastatin 40 milliGRAM(s) Oral at bedtime  dextrose 5%. 1000 milliLiter(s) (50 mL/Hr) IV Continuous <Continuous>  dextrose 50% Injectable 12.5 Gram(s) IV Push once  dextrose 50% Injectable 25 Gram(s) IV Push once  dextrose 50% Injectable 25 Gram(s) IV Push once  DULoxetine 60 milliGRAM(s) Oral two times a day  enoxaparin Injectable 40 milliGRAM(s) SubCutaneous daily  ferrous    sulfate 325 milliGRAM(s) Oral four times a day  folic acid 1 milliGRAM(s) Oral daily  gabapentin 300 milliGRAM(s) Oral three times a day  insulin lispro (HumaLOG) corrective regimen sliding scale   SubCutaneous three times a day before meals  methocarbamol 750 milliGRAM(s) Oral every 8 hours  metoprolol tartrate 25 milliGRAM(s) Oral three times a day  multiple electrolytes Injection Type 1 1000 milliLiter(s) (100 mL/Hr) IV Continuous <Continuous>  multivitamin 1 Tablet(s) Oral daily  pantoprazole    Tablet 40 milliGRAM(s) Oral before breakfast  piperacillin/tazobactam IVPB.. 3.375 Gram(s) IV Intermittent every 8 hours  saccharomyces boulardii 250 milliGRAM(s) Oral two times a day  silver sulfADIAZINE 1% Cream 1 Application(s) Topical daily  thiamine 100 milliGRAM(s) Oral daily    MEDICATIONS  (PRN):  aluminum hydroxide/magnesium hydroxide/simethicone Suspension 30 milliLiter(s) Oral every 6 hours PRN Dyspepsia  dextrose 40% Gel 15 Gram(s) Oral once PRN Blood Glucose LESS THAN 70 milliGRAM(s)/deciliter  glucagon  Injectable 1 milliGRAM(s) IntraMuscular once PRN Glucose LESS THAN 70 milligrams/deciliter  HYDROmorphone   Tablet 2 milliGRAM(s) Oral every 4 hours PRN Moderate Pain (4 - 6)  HYDROmorphone   Tablet 4 milliGRAM(s) Oral every 4 hours PRN Severe Pain (7 - 10)  HYDROmorphone  Injectable 1 milliGRAM(s) IV Push every 3 hours PRN Severe Pain (7 - 10)  metoprolol tartrate IVPB 5 milliGRAM(s) IV Intermittent once PRN HR greater than 120  zolpidem 5 milliGRAM(s) Oral at bedtime PRN Insomnia      Vital Signs Last 24 Hrs  T(C): 36.9 (08 Aug 2019 23:19), Max: 37.1 (08 Aug 2019 15:22)  T(F): 98.5 (08 Aug 2019 23:19), Max: 98.7 (08 Aug 2019 15:22)  HR: 103 (09 Aug 2019 05:19) (103 - 137)  BP: 164/99 (09 Aug 2019 05:19) (141/88 - 164/99)  BP(mean): --  RR: 20 (08 Aug 2019 23:19) (18 - 20)  SpO2: 97% (08 Aug 2019 23:19) (96% - 97%)  I&O's Summary    08 Aug 2019 07:01  -  09 Aug 2019 07:00  --------------------------------------------------------  IN: 1080 mL / OUT: 2400 mL / NET: -1320 mL      Physical Exam:   Gen: NAD lying in bed  Ext: Right AKA with clean dressing intact, covered with iodoform.  Right thigh is soft and compressible.  Left TMA with dressing in place.      LABS:                         8.6    10.41 )-----------( 457      ( 08 Aug 2019 07:33 )             29.4   08-08    136  |  104  |  21.0<H>  ----------------------------<  230<H>  4.0   |  22.0  |  0.89    Ca    7.7<L>      08 Aug 2019 07:33    CAPILLARY BLOOD GLUCOSE  POCT Blood Glucose.: 273 mg/dL (08 Aug 2019 21:52)  POCT Blood Glucose.: 219 mg/dL (08 Aug 2019 17:04)  POCT Blood Glucose.: 245 mg/dL (08 Aug 2019 11:12)  POCT Blood Glucose.: 230 mg/dL (08 Aug 2019 07:53)            RADIOLOGY & ADDITIONAL STUDIES: No addl studies

## 2019-08-09 NOTE — PROGRESS NOTE ADULT - ASSESSMENT
53 y/o M, POD #2 right AKA. Hx: opioid and ETOH abuse, previously on Suboxone. Last picked up 04/17/19. He was found comfortably resting, supine in bed, NAD. Discussed transition to an oral regimen, with return to Suboxone prior to facility discharge. Patient verbalizes understanding.

## 2019-08-09 NOTE — PROGRESS NOTE ADULT - SUBJECTIVE AND OBJECTIVE BOX
Patient is a 54y old Male who presents with a chief complaint of sepsis, foot wound (09 Aug 2019 10:14)  He's now POD #2 AKA following BKA on 08/01/19. He's been utilizing IV Dilaudid 1mg with good relief. Transitioned to oral Dilaudid 2/4mg PO this AM in anticipation of discharge to acute rehab.    VERBAL REPORT: "The pills are helping."    PAIN SCORE: 4-5/10 at rest  (VNRS)    MEDICATIONS  (STANDING):  acetaminophen   Tablet .. 975 milliGRAM(s) Oral every 8 hours  ascorbic acid 500 milliGRAM(s) Oral daily  atorvastatin 40 milliGRAM(s) Oral at bedtime  dextrose 5%. 1000 milliLiter(s) (50 mL/Hr) IV Continuous <Continuous>  dextrose 50% Injectable 12.5 Gram(s) IV Push once  dextrose 50% Injectable 25 Gram(s) IV Push once  dextrose 50% Injectable 25 Gram(s) IV Push once  DULoxetine 60 milliGRAM(s) Oral two times a day  enoxaparin Injectable 40 milliGRAM(s) SubCutaneous daily  ferrous    sulfate 325 milliGRAM(s) Oral four times a day  folic acid 1 milliGRAM(s) Oral daily  gabapentin 300 milliGRAM(s) Oral three times a day  insulin lispro (HumaLOG) corrective regimen sliding scale   SubCutaneous three times a day before meals  methocarbamol 750 milliGRAM(s) Oral every 8 hours  metoprolol tartrate 50 milliGRAM(s) Oral two times a day  multiple electrolytes Injection Type 1 1000 milliLiter(s) (100 mL/Hr) IV Continuous <Continuous>  multivitamin 1 Tablet(s) Oral daily  pantoprazole    Tablet 40 milliGRAM(s) Oral before breakfast  piperacillin/tazobactam IVPB.. 3.375 Gram(s) IV Intermittent every 8 hours  saccharomyces boulardii 250 milliGRAM(s) Oral two times a day  silver sulfADIAZINE 1% Cream 1 Application(s) Topical daily  thiamine 100 milliGRAM(s) Oral daily    MEDICATIONS  (PRN):  aluminum hydroxide/magnesium hydroxide/simethicone Suspension 30 milliLiter(s) Oral every 6 hours PRN Dyspepsia  dextrose 40% Gel 15 Gram(s) Oral once PRN Blood Glucose LESS THAN 70 milliGRAM(s)/deciliter  glucagon  Injectable 1 milliGRAM(s) IntraMuscular once PRN Glucose LESS THAN 70 milligrams/deciliter  HYDROmorphone   Tablet 2 milliGRAM(s) Oral every 4 hours PRN Moderate Pain (4 - 6)  HYDROmorphone   Tablet 4 milliGRAM(s) Oral every 4 hours PRN Severe Pain (7 - 10)  HYDROmorphone  Injectable 0.5 milliGRAM(s) SubCutaneous every 6 hours PRN Severe Pain unrelieved by oral opioids after 1 hour  metoprolol tartrate IVPB 5 milliGRAM(s) IV Intermittent once PRN HR greater than 120  zolpidem 5 milliGRAM(s) Oral at bedtime PRN Insomnia      PHYSICAL EXAM:  A&Ox3, NAD, supine in bed. Presently no evidence of intense pain or oversedation.         T(F): , Max: 98.7 (15:22)  HR:  (103 - 137)  BP:  (141/88 - 164/99)  RR:  (18 - 20)  SpO2:  (96% - 97%)      Pain Service  Text page via [employer]  PIN: [phone]

## 2019-08-09 NOTE — OCCUPATIONAL THERAPY INITIAL EVALUATION ADULT - GENERAL OBSERVATIONS, REHAB EVAL
Received pt in supine position in bed, +telemetry, +IV lock, +bed alarm (3rd position); pt requires max encouragement for participation with OT.

## 2019-08-09 NOTE — CONSULT NOTE ADULT - CONSULT REASON
Gangrene Right foot
Pt is s/p R AKA. Need eval due to significant change in performing ADl.
Right foot gangrene with maggots
sepsis sec to wet gangrene
suboxone - H/o IVDA
wet gangrene
Patient is a 54y old  Male who presents with a chief complaint of sepsis, foot wound (02 Aug 2019 14:04)    HPI:  54M hx HTN, HLD, DM2, ETOH abuse, IVDU (on suboxone), multiple toe/foot amputations presents with severe foot infection. Patient states that lives on own and hasn't been able to leave house for the last month. He states he's friend with delivery people that bring him his food and meds. He's had multiple foot/toe amputations due to diabetic ulcers. The last one was about two years ago. He's noticed this foot wound brewing for a while, but has not sought medical attention because was concerned that would need amputation and thought could take care of it himself. He states for the last month has not mila able to walk on foot. He states for the past few days he's been having worsening bilateral leg and foot pain and has felt very fatigued. Today his brother came to check on him and upon seeing the foot wound made him call EMS and come to ED.     Pt has history of etoh abuse, but states cut back a lot. He states drinks bottle of champagne a couple times a month and last time being two weeks ago. He also initially denied history of IVDU, however when asked about suboxone subscription he states that he's been on suboxone for last 2-3 years.     Upon arriving to ED, patient was had temp of 99.7, heart rate 150 (improved to 126 s/p ivf bolus), bp 86/54 (improved to 97/54), RR- 22 saturating 95% on room air. Patient was found to have diffuse ulcers of foot and ankle with exposed bones/tendons and infested with maggots. Patient given 2.7 bolus, vanc and zosyn. Podiatry washed out wound. (31 Jul 2019 23:37) <END OF COPIED TEXT>     Pain Service  POD #1 Guillotine amputation below right knee. Team requests assistance with pain regimen, as he has a history of Suboxone use outpatient. He is presently being offered Hydromorphone 1mg SubQ PRN.

## 2019-08-09 NOTE — CONSULT NOTE ADULT - ATTENDING COMMENTS
Patient seen and examined and cased discussed with resident. Agree with recommendations.     Patient with new right AKA with history of left TMA. Patient with ability to properly self care upon DC and now with no home, as patient reports he will not be able to return to his prior living situation due to high rent (inconsistent as he may be living in a "group home?"). Given this dispo situation, patient not likely to meet AR criteria. Patient case is also complicated by his drug abuse history, would need to have all IV meds DC prior to discharge, but being on Suboxone complicates dispo as well given follow up and limited facilities which will take patient.     At this time would optimize DC medically/surgically, continue daily bedside therapy with attempt to search for [facility] beds who will take patient given above.

## 2019-08-09 NOTE — OCCUPATIONAL THERAPY INITIAL EVALUATION ADULT - ADDITIONAL COMMENTS
Pt reports he is homeless and has nowhere to go upon discharge. Pt reports he was renting a room prior to admission but has lost it since being in hospital. Pt was independent prior to admission. Pt does not own any DME. Pt is right handed. Pt drives prior to admission, however car broke down. Pt reports he has no support system to assist upon discharge.

## 2019-08-09 NOTE — OCCUPATIONAL THERAPY INITIAL EVALUATION ADULT - RANGE OF MOTION EXAMINATION, UPPER EXTREMITY
Left UE Active ROM was WFL (within functional limits)/Right UE Active ROM was WNL (within normal limits)

## 2019-08-09 NOTE — PROGRESS NOTE ADULT - SUBJECTIVE AND OBJECTIVE BOX
[facility]  INFECTIOUS DISEASES AND INTERNAL MEDICINE at [place]  =======================================================  Rupesh Santa MD  Diplomates American Board of Internal Medicine and Infectious Diseases  Tel: [phone]      Fax: [phone]  =======================================================    BREANNA MENDOZA [id number]    Follow up: maggot infestation of wound; RIGHT BKA stump infection  s/p AKA on 8/7/19  no significant pain      Allergies:  Allergy Status Unknown    Antibiotics:  piperacillin/tazobactam IVPB.. 3.375 Gram(s) IV Intermittent every 8 hours    REVIEW OF SYSTEMS:  as above    Physical Exam:  T(F): 97.9 (09 Aug 2019 08:44), Max: 98.7 (08 Aug 2019 15:22)  HR: 103 (09 Aug 2019 08:44)  BP: 151/90 (09 Aug 2019 08:44)  RR: 20 (09 Aug 2019 08:44)  SpO2: 97% (08 Aug 2019 23:19) (96% - 97%)  temp max in last 48H T(F): , Max: 99.1 (08-07-19 @ 11:30)    GEN: NAD, pleasant  HEENT: normocephalic and atraumatic. EOMI. PERRL.  Anicteric   NECK: Supple.   LUNGS: Clear to auscultation.  HEART: Regular rate and rhythm  ABDOMEN: Soft, nontender, and nondistended.  Positive bowel sounds.    : No CVA tenderness  EXTREMITIES: Without any edema.  MSK: no joint swelling  NEUROLOGIC: No focal deficits  PSYCHIATRIC: Appropriate affect .  SKIN:  right AKA, stump with dressing in place    ===========  Labs:                        8.2    7.86  )-----------( 438      ( 09 Aug 2019 07:52 )             28.0       WBC Count: 7.86 K/uL (08-09-19 @ 07:52)  WBC Count: 10.41 K/uL (08-08-19 @ 07:33)  WBC Count: 6.39 K/uL (08-07-19 @ 07:29)  WBC Count: 6.99 K/uL (08-06-19 @ 07:59)  WBC Count: 7.72 K/uL (08-05-19 @ 07:03)      08-09    139  |  105  |  18.0  ----------------------------<  198<H>  4.1   |  24.0  |  0.68    Ca    8.1<L>      09 Aug 2019 07:52        Culture - Urine (collected 08-01-19 @ 05:26)  Source: .Urine  Final Report (08-02-19 @ 10:09):    No growth    Culture - Blood (collected 07-31-19 @ 19:34)  Source: .Blood  Final Report (08-05-19 @ 21:00):    No growth at 5 days.    Culture - Blood (collected 07-31-19 @ 19:34)  Source: .Blood  Final Report (08-05-19 @ 21:00):    No growth at 5 days.

## 2019-08-09 NOTE — PROGRESS NOTE ADULT - ASSESSMENT
54M hx HTN, HLD, DM2, ETOH abuse, IVDU (on suboxone), multiple toe/foot amputations presents with RLE foot ulcer and pain x 5 months.  In ED temp of 99.7, heart rate 150 (improved to 126 s/p ivf bolus), bp 86/54 (improved to 97/54), RR- 22 saturating 95% on room air.   Labs initially WBC 20, Hb 8, Na 123, K 7, Cr 2    Overall, severe sepsis secondary to wet gangrene, maggot infestation of wound, s/p BRANT, leukocytosis, anemia, uncontrolled diabetes  s/p Right BKA 8/1/19 . Afebrile, BP improved, leukocytosis and BRANT resolved.    s/p right AKA 8/7/19    on piperacillin/tazobactam IVPB.. 3.375 Gram(s) IV Intermittent every 8 hours  - Limit this to a 4 day course post-op  END Date 8/11/19          Will Follow

## 2019-08-09 NOTE — PROGRESS NOTE ADULT - ASSESSMENT
Assessment:    [age] y/o M POD 2  s/p right AKA with proximal femoral nerve resection; feeling well this morning; patient's pain is well-controlled.     Plan:   •  DVT prophylaxis as prescribed, including use of compression devices and ankle pumps to the left LE   ·	Abx per ID recommendations - 4 days after revision; will continue vanc & zosyn  •  physical therapy   •  DM management  •  Elevate right AKA stump. Dressing to be removed by vascular surgery tomorrow  •  Pain control    •  Incentive spirometry encouraged  •  Discharge planning – anticipated discharge is  subacute rehabilitation vs  acute rehabilitation

## 2019-08-09 NOTE — OCCUPATIONAL THERAPY INITIAL EVALUATION ADULT - DIAGNOSIS, OT EVAL
s/p Right BKA (post-op day 8) and s/p right AKA revision with proximal femoral nerve resection (post-op day 2); pt with history of left TMA

## 2019-08-09 NOTE — CONSULT NOTE ADULT - CONSULT REQUESTED DATE/TIME
01-Aug-2019 12:48
01-Aug-2019 13:19
02-Aug-2019 14:04
09-Aug-2019 10:14
31-Jul-2019 21:41
31-Jul-2019 23:33
01-Aug-2019 17:34

## 2019-08-09 NOTE — CONSULT NOTE ADULT - SUBJECTIVE AND OBJECTIVE BOX
54yM was admitted on 07-31    In ED, GCS=15    Patient is a 54y old  Male who presents with a chief complaint of sepsis, foot wound (09 Aug 2019 07:55)    HPI:  54M hx HTN, HLD, DM2, ETOH abuse, IVDU (on suboxone), multiple toe/foot amputations presents with severe foot infection. Patient states that lives on own and hasn't been able to leave house for the last month. He states he's friend with delivery people that bring him his food and meds. He's had multiple foot/toe amputations due to diabetic ulcers. The last one was about two years ago. He's noticed this foot wound brewing for a while, but has not sought medical attention because was concerned that would need amputation and thought could take care of it himself. He states for the last month has not mila able to walk on foot. He states for the past few days he's been having worsening bilateral leg and foot pain and has felt very fatigued. Today his brother came to check on him and upon seeing the foot wound made him call EMS and come to ED.     Pt has history of etoh abuse, but states cut back a lot. He states drinks bottle of champagne a couple times a month and last time being two weeks ago. He also initially denied history of IVDU, however when asked about suboxone subscription he states that he's been on suboxone for last 2-3 years.     Upon arriving to ED, patient was had temp of 99.7, heart rate 150 (improved to 126 s/p ivf bolus), bp 86/54 (improved to 97/54), RR- 22 saturating 95% on room air. Patient was found to have diffuse ulcers of foot and ankle with exposed bones/tendons and infested with maggots. Patient given 2.7 bolus, vanc and zosyn. Podiatry washed out wound. (31 Jul 2019 23:37)    Patient is now s/p AKA of his right lower extremity. Patient continues to be in a lot of pain at the surgical site, especially with movement of the extremity.     Imaging showed (reviewed):  XRAY RIGHT FOOT - 7/31 -  Soft tissue swelling, ulcerations consistent with diffuse osteomyelitis involving the phalanges and metatarsal bones tarsal bones possibly ankle joint/show bilateral medial malleolus.  XRAY LEFT FOOT - 8/7 - Soft tissue swelling with ulceration following transmetatarsal amputation. Subtle of periosteal reaction of the fourth remnant metatarsal bones. If osteomyelitis is considered, despite conservative therapy, and soft tissue / bone infection requires further assessment, follow-up MRI recommended.      REVIEW OF SYSTEMS  Constitutional - No fever, No weight loss, + fatigue  HEENT - No eye pain, No visual disturbances, No difficulty hearing, No tinnitus, No vertigo, No neck pain  Respiratory - No cough, No wheezing, No shortness of breath  Cardiovascular - No chest pain, No palpitations  Gastrointestinal - No abdominal pain, No nausea, No vomiting, No diarrhea, No constipation  Genitourinary - No dysuria, No frequency, No hematuria, No incontinence  Neurological - No headaches, No memory loss, + loss of strength, No numbness, No tremors  Skin - No itching, No rashes, + lesions   Endocrine - No temperature intolerance  Musculoskeletal - + joint pain, +joint swelling, No muscle pain  Psychiatric - + depression, No anxiety    VITALS  T(C): 36.6 (08-09-19 @ 08:44), Max: 37.1 (08-08-19 @ 15:22)  HR: 103 (08-09-19 @ 08:44) (103 - 137)  BP: 151/90 (08-09-19 @ 08:44) (141/88 - 164/99)  RR: 20 (08-09-19 @ 08:44) (18 - 20)  SpO2: 97% (08-08-19 @ 23:19) (96% - 97%)  Wt(kg): --    PAST MEDICAL & SURGICAL HISTORY  Opioid abuse  ETOH abuse  Hyperlipidemia  HTN (hypertension)  DM (diabetes mellitus)  Amputation of toe, left, traumatic  Amputation of great toe, right, traumatic      SOCIAL HISTORY  Smoking - Denied  EtOH - Patient states that he uses alcohol 1-2 times a week, usually a few drinks   Drugs - Patient has history of IV Drug abuse, now on suboxone    FUNCTIONAL HISTORY  Lives alone in an apartment. There are 5 steps to enter and no stairs inside. Of note, patient states that upon discharge, he will no longer be able to return to this apartment as his landlord has raised the rent too high. As of now, patient does not have anywhere to return to upon discharge. Prior to admission, patient was fully independent with his ADLs, though for the past month, he has not been able to leave his house as his right foot was causing him too much pain.    CURRENT FUNCTIONAL STATUS  8/9/19  Bathing Training:     · Level of [place]	maximum assist (25% patients effort); sponge, by report	  · Physical Assist/Nonphysical Assist	1 person assist; nonverbal cues (demo/gestures); verbal cues; set-up required	    Upper Body Dressing Training:     · Level of [place]	moderate assist (50% patients effort); gown	  · Physical Assist/Nonphysical Assist	1 person assist; nonverbal cues (demo/gestures); verbal cues	    Lower Body Dressing Training:     · Level of [place]	to be assessed	    Toilet Hygiene Training:     · Level of [place]	to be assessed	    Grooming Training:     · Level of [place]	minimum assist (75% patients effort)	  · Physical Assist/Nonphysical Assist	1 person assist; nonverbal cues (demo/gestures); verbal cues; set-up required	    Eating/Self-Feeding Training:     · Level of [place]	supervision; not observed however pt report	  · Physical Assist/Nonphysical Assist	set-up required; verbal cues	    8/8/19  Bed Mobility: Rolling/Turning:     · Level of [place]	moderate assist (50% patients effort)	    Bed Mobility: Sit to Supine:     · Level of [place]	maximum assist (25% patients effort)	    Bed Mobility: Supine to Sit:     · Level of [place]	maximum assist (25% patients effort)	    Bed Mobility Analysis:     · Bed Mobility Limitations	sitting tolerance limited due to pain and fatigue, verbal cues for sequencing throughout	    Transfer: Bed to Chair:     Transfer Skill: Bed to Chair   · Level of [place]	safety	        FAMILY HISTORY   No pertinent family history in first degree relatives      RECENT LABS/IMAGING  CBC Full  -  ( 09 Aug 2019 07:52 )  WBC Count : 7.86 K/uL  RBC Count : 3.14 M/uL  Hemoglobin : 8.2 g/dL  Hematocrit : 28.0 %  Platelet Count - Automated : 438 K/uL  Mean Cell Volume : 89.2 fl  Mean Cell Hemoglobin : 26.1 pg  Mean Cell Hemoglobin Concentration : 29.3 gm/dL  Auto Neutrophil # : 5.54 K/uL  Auto Lymphocyte # : 1.18 K/uL  Auto Monocyte # : 0.65 K/uL  Auto Eosinophil # : 0.35 K/uL  Auto Basophil # : 0.08 K/uL  Auto Neutrophil % : 70.4 %  Auto Lymphocyte % : 15.0 %  Auto Monocyte % : 8.3 %  Auto Eosinophil % : 4.5 %  Auto Basophil % : 1.0 %    08-09    139  |  105  |  18.0  ----------------------------<  198<H>  4.1   |  24.0  |  0.68    Ca    8.1<L>      09 Aug 2019 07:52          ALLERGIES  Allergy Status Unknown      MEDICATIONS   acetaminophen   Tablet .. 975 milliGRAM(s) Oral every 8 hours  aluminum hydroxide/magnesium hydroxide/simethicone Suspension 30 milliLiter(s) Oral every 6 hours PRN  ascorbic acid 500 milliGRAM(s) Oral daily  atorvastatin 40 milliGRAM(s) Oral at bedtime  dextrose 40% Gel 15 Gram(s) Oral once PRN  dextrose 5%. 1000 milliLiter(s) IV Continuous <Continuous>  dextrose 50% Injectable 12.5 Gram(s) IV Push once  dextrose 50% Injectable 25 Gram(s) IV Push once  dextrose 50% Injectable 25 Gram(s) IV Push once  DULoxetine 60 milliGRAM(s) Oral two times a day  enoxaparin Injectable 40 milliGRAM(s) SubCutaneous daily  ferrous    sulfate 325 milliGRAM(s) Oral four times a day  folic acid 1 milliGRAM(s) Oral daily  gabapentin 300 milliGRAM(s) Oral three times a day  glucagon  Injectable 1 milliGRAM(s) IntraMuscular once PRN  HYDROmorphone   Tablet 2 milliGRAM(s) Oral every 4 hours PRN  HYDROmorphone   Tablet 4 milliGRAM(s) Oral every 4 hours PRN  HYDROmorphone  Injectable 1 milliGRAM(s) IV Push every 3 hours PRN  insulin lispro (HumaLOG) corrective regimen sliding scale   SubCutaneous three times a day before meals  methocarbamol 750 milliGRAM(s) Oral every 8 hours  metoprolol tartrate 50 milliGRAM(s) Oral two times a day  metoprolol tartrate IVPB 5 milliGRAM(s) IV Intermittent once PRN  multiple electrolytes Injection Type 1 1000 milliLiter(s) IV Continuous <Continuous>  multivitamin 1 Tablet(s) Oral daily  pantoprazole    Tablet 40 milliGRAM(s) Oral before breakfast  piperacillin/tazobactam IVPB.. 3.375 Gram(s) IV Intermittent every 8 hours  saccharomyces boulardii 250 milliGRAM(s) Oral two times a day  silver sulfADIAZINE 1% Cream 1 Application(s) Topical daily  thiamine 100 milliGRAM(s) Oral daily  zolpidem 5 milliGRAM(s) Oral at bedtime PRN      ----------------------------------------------------------------------------------------  PHYSICAL EXAM  Constitutional - Mild distress due to right lower extremity pain  HEENT - NCAT, EOMI  Neck - Supple, No limited ROM  Extremities - right AKA wrapped, tender to palpation, LLE with TMA wrapped in bandage due to ulcers, edema present below Left knee with chronic venous stasis changes   Neurologic Exam -                    Cognitive - Awake, Alert, AAO to self, place, date, year, situation     Communication - Fluent, No dysarthria     Cranial Nerves - CN 2-12 intact     Motor -                     LEFT    UE - ShAB 5/5, EF 5/5, EE 5/5, WE 5/5,  5/5                    RIGHT UE - ShAB 5/5, EF 5/5, EE 5/5, WE 5/5,  5/5                    LEFT    LE - HF 3/5, KE 3/5, DF 5/5, PF 5/5                    RIGHT LE - HF 2/5 (limited by pain)     Sensory - Intact to LT  Psychiatric - Mood depressed, Affect WNL  ----------------------------------------------------------------------------------------  ASSESSMENT/PLAN  54y Male with functional deficits after osteomyelitis to Right lower extremity, now s/p right AKA with proximal femoral nerve resection. Patient is still having pain at the right lower extremity, but he is overall feeling improved since prior to admission    Osteomyelitis of RLE - s/p AKA, Zosyn  Pain - Tylenol, Cymbalta, Dilaudid, Neurontin, Robaxin, pain management  HTN - Lopressor  HLD - Lipitor  DM - Insulin Sliding scale  DVT PPX - SCDs, Lovenox      Rehab - Due to his amputation, patient meets criteria for ACUTE inpatient rehabilitation. However, patient with social complications that could prevent placement at an acute inpatient rehabilitation facility.     Patient has a history of IV Drug Abuse and has been taking Suboxone prescribed by a private physician. There has been difficulty reaching his pain management physician, but patient will require this medication upon discharge. Plan is for patient to be taken off of the opioid pain medication he is prescribed here upon discharge and switched back to the Suboxone.      Patient as of now has no home to return to. He was living in an apartment up until admission, but his landlord has increased the rent to a price that he cannot afford. He does not have nay family or friends in the area that he can live with upon discharge and so he does not know where he will go when he will be ready to be discharged from a future facility.    Patient would benefit from a rehabilitation stay to regain strength and become accustomed to ambulating and caring for himself with his AKA, however these are the obstacles that could prevent an acute or subacute rehabilitation facility acceptance    Will continue to follow for ongoing rehab needs and recommendations.   Continue bedside therapy as well as OOB throughout the day with mobilization throughout the day with staff to maintain cardiopulmonary function and prevention of secondary complications related to debility.

## 2019-08-10 LAB
ANION GAP SERPL CALC-SCNC: 10 MMOL/L — SIGNIFICANT CHANGE UP (ref 5–17)
BASOPHILS # BLD AUTO: 0.08 K/UL — SIGNIFICANT CHANGE UP (ref 0–0.2)
BASOPHILS NFR BLD AUTO: 1.1 % — SIGNIFICANT CHANGE UP (ref 0–2)
BUN SERPL-MCNC: 12 MG/DL — SIGNIFICANT CHANGE UP (ref 8–20)
CALCIUM SERPL-MCNC: 8.2 MG/DL — LOW (ref 8.6–10.2)
CHLORIDE SERPL-SCNC: 104 MMOL/L — SIGNIFICANT CHANGE UP (ref 98–107)
CO2 SERPL-SCNC: 26 MMOL/L — SIGNIFICANT CHANGE UP (ref 22–29)
CREAT SERPL-MCNC: 0.5 MG/DL — SIGNIFICANT CHANGE UP (ref 0.5–1.3)
EOSINOPHIL # BLD AUTO: 0.3 K/UL — SIGNIFICANT CHANGE UP (ref 0–0.5)
EOSINOPHIL NFR BLD AUTO: 4 % — SIGNIFICANT CHANGE UP (ref 0–6)
GLUCOSE BLDC GLUCOMTR-MCNC: 190 MG/DL — HIGH (ref 70–99)
GLUCOSE BLDC GLUCOMTR-MCNC: 238 MG/DL — HIGH (ref 70–99)
GLUCOSE BLDC GLUCOMTR-MCNC: 261 MG/DL — HIGH (ref 70–99)
GLUCOSE BLDC GLUCOMTR-MCNC: 266 MG/DL — HIGH (ref 70–99)
GLUCOSE SERPL-MCNC: 183 MG/DL — HIGH (ref 70–115)
HCT VFR BLD CALC: 27.8 % — LOW (ref 39–50)
HGB BLD-MCNC: 8.2 G/DL — LOW (ref 13–17)
IMM GRANULOCYTES NFR BLD AUTO: 0.7 % — SIGNIFICANT CHANGE UP (ref 0–1.5)
LYMPHOCYTES # BLD AUTO: 1.16 K/UL — SIGNIFICANT CHANGE UP (ref 1–3.3)
LYMPHOCYTES # BLD AUTO: 15.4 % — SIGNIFICANT CHANGE UP (ref 13–44)
MCHC RBC-ENTMCNC: 26.5 PG — LOW (ref 27–34)
MCHC RBC-ENTMCNC: 29.5 GM/DL — LOW (ref 32–36)
MCV RBC AUTO: 90 FL — SIGNIFICANT CHANGE UP (ref 80–100)
MONOCYTES # BLD AUTO: 0.6 K/UL — SIGNIFICANT CHANGE UP (ref 0–0.9)
MONOCYTES NFR BLD AUTO: 8 % — SIGNIFICANT CHANGE UP (ref 2–14)
NEUTROPHILS # BLD AUTO: 5.33 K/UL — SIGNIFICANT CHANGE UP (ref 1.8–7.4)
NEUTROPHILS NFR BLD AUTO: 70.8 % — SIGNIFICANT CHANGE UP (ref 43–77)
PLATELET # BLD AUTO: 436 K/UL — HIGH (ref 150–400)
POTASSIUM SERPL-MCNC: 4 MMOL/L — SIGNIFICANT CHANGE UP (ref 3.5–5.3)
POTASSIUM SERPL-SCNC: 4 MMOL/L — SIGNIFICANT CHANGE UP (ref 3.5–5.3)
RBC # BLD: 3.09 M/UL — LOW (ref 4.2–5.8)
RBC # FLD: 17.5 % — HIGH (ref 10.3–14.5)
SODIUM SERPL-SCNC: 140 MMOL/L — SIGNIFICANT CHANGE UP (ref 135–145)
WBC # BLD: 7.52 K/UL — SIGNIFICANT CHANGE UP (ref 3.8–10.5)
WBC # FLD AUTO: 7.52 K/UL — SIGNIFICANT CHANGE UP (ref 3.8–10.5)

## 2019-08-10 PROCEDURE — 99233 SBSQ HOSP IP/OBS HIGH 50: CPT | Mod: GC

## 2019-08-10 RX ORDER — ALBUMIN HUMAN 25 %
50 VIAL (ML) INTRAVENOUS ONCE
Refills: 0 | Status: DISCONTINUED | OUTPATIENT
Start: 2019-08-10 | End: 2019-08-10

## 2019-08-10 RX ORDER — ALBUMIN HUMAN 25 %
100 VIAL (ML) INTRAVENOUS ONCE
Refills: 0 | Status: COMPLETED | OUTPATIENT
Start: 2019-08-10 | End: 2019-08-10

## 2019-08-10 RX ORDER — HYDROMORPHONE HYDROCHLORIDE 2 MG/ML
0.5 INJECTION INTRAMUSCULAR; INTRAVENOUS; SUBCUTANEOUS ONCE
Refills: 0 | Status: DISCONTINUED | OUTPATIENT
Start: 2019-08-10 | End: 2019-08-10

## 2019-08-10 RX ADMIN — Medication 50 MILLILITER(S): at 18:01

## 2019-08-10 RX ADMIN — DULOXETINE HYDROCHLORIDE 60 MILLIGRAM(S): 30 CAPSULE, DELAYED RELEASE ORAL at 17:06

## 2019-08-10 RX ADMIN — GABAPENTIN 300 MILLIGRAM(S): 400 CAPSULE ORAL at 21:44

## 2019-08-10 RX ADMIN — Medication 50 MILLIGRAM(S): at 06:43

## 2019-08-10 RX ADMIN — ZOLPIDEM TARTRATE 5 MILLIGRAM(S): 10 TABLET ORAL at 21:44

## 2019-08-10 RX ADMIN — Medication 500 MILLIGRAM(S): at 13:21

## 2019-08-10 RX ADMIN — HYDROMORPHONE HYDROCHLORIDE 4 MILLIGRAM(S): 2 INJECTION INTRAMUSCULAR; INTRAVENOUS; SUBCUTANEOUS at 06:39

## 2019-08-10 RX ADMIN — METHOCARBAMOL 750 MILLIGRAM(S): 500 TABLET, FILM COATED ORAL at 13:21

## 2019-08-10 RX ADMIN — Medication 325 MILLIGRAM(S): at 21:41

## 2019-08-10 RX ADMIN — Medication 50 MILLIGRAM(S): at 17:06

## 2019-08-10 RX ADMIN — PANTOPRAZOLE SODIUM 40 MILLIGRAM(S): 20 TABLET, DELAYED RELEASE ORAL at 06:43

## 2019-08-10 RX ADMIN — Medication 325 MILLIGRAM(S): at 13:21

## 2019-08-10 RX ADMIN — HYDROMORPHONE HYDROCHLORIDE 0.5 MILLIGRAM(S): 2 INJECTION INTRAMUSCULAR; INTRAVENOUS; SUBCUTANEOUS at 15:20

## 2019-08-10 RX ADMIN — HYDROMORPHONE HYDROCHLORIDE 4 MILLIGRAM(S): 2 INJECTION INTRAMUSCULAR; INTRAVENOUS; SUBCUTANEOUS at 07:56

## 2019-08-10 RX ADMIN — ATORVASTATIN CALCIUM 40 MILLIGRAM(S): 80 TABLET, FILM COATED ORAL at 21:40

## 2019-08-10 RX ADMIN — Medication 975 MILLIGRAM(S): at 21:40

## 2019-08-10 RX ADMIN — Medication 1: at 08:20

## 2019-08-10 RX ADMIN — HYDROMORPHONE HYDROCHLORIDE 0.5 MILLIGRAM(S): 2 INJECTION INTRAMUSCULAR; INTRAVENOUS; SUBCUTANEOUS at 09:12

## 2019-08-10 RX ADMIN — HYDROMORPHONE HYDROCHLORIDE 0.5 MILLIGRAM(S): 2 INJECTION INTRAMUSCULAR; INTRAVENOUS; SUBCUTANEOUS at 08:57

## 2019-08-10 RX ADMIN — HYDROMORPHONE HYDROCHLORIDE 0.5 MILLIGRAM(S): 2 INJECTION INTRAMUSCULAR; INTRAVENOUS; SUBCUTANEOUS at 07:47

## 2019-08-10 RX ADMIN — GABAPENTIN 300 MILLIGRAM(S): 400 CAPSULE ORAL at 13:27

## 2019-08-10 RX ADMIN — PIPERACILLIN AND TAZOBACTAM 25 GRAM(S): 4; .5 INJECTION, POWDER, LYOPHILIZED, FOR SOLUTION INTRAVENOUS at 06:43

## 2019-08-10 RX ADMIN — ENOXAPARIN SODIUM 40 MILLIGRAM(S): 100 INJECTION SUBCUTANEOUS at 13:21

## 2019-08-10 RX ADMIN — HYDROMORPHONE HYDROCHLORIDE 4 MILLIGRAM(S): 2 INJECTION INTRAMUSCULAR; INTRAVENOUS; SUBCUTANEOUS at 13:27

## 2019-08-10 RX ADMIN — Medication 250 MILLIGRAM(S): at 17:06

## 2019-08-10 RX ADMIN — Medication 3: at 17:07

## 2019-08-10 RX ADMIN — HYDROMORPHONE HYDROCHLORIDE 0.5 MILLIGRAM(S): 2 INJECTION INTRAMUSCULAR; INTRAVENOUS; SUBCUTANEOUS at 23:01

## 2019-08-10 RX ADMIN — Medication 325 MILLIGRAM(S): at 00:46

## 2019-08-10 RX ADMIN — HYDROMORPHONE HYDROCHLORIDE 0.5 MILLIGRAM(S): 2 INJECTION INTRAMUSCULAR; INTRAVENOUS; SUBCUTANEOUS at 08:03

## 2019-08-10 RX ADMIN — HYDROMORPHONE HYDROCHLORIDE 4 MILLIGRAM(S): 2 INJECTION INTRAMUSCULAR; INTRAVENOUS; SUBCUTANEOUS at 14:30

## 2019-08-10 RX ADMIN — HYDROMORPHONE HYDROCHLORIDE 0.5 MILLIGRAM(S): 2 INJECTION INTRAMUSCULAR; INTRAVENOUS; SUBCUTANEOUS at 00:46

## 2019-08-10 RX ADMIN — Medication 1 MILLIGRAM(S): at 13:21

## 2019-08-10 RX ADMIN — Medication 975 MILLIGRAM(S): at 22:40

## 2019-08-10 RX ADMIN — GABAPENTIN 300 MILLIGRAM(S): 400 CAPSULE ORAL at 06:43

## 2019-08-10 RX ADMIN — HYDROMORPHONE HYDROCHLORIDE 0.5 MILLIGRAM(S): 2 INJECTION INTRAMUSCULAR; INTRAVENOUS; SUBCUTANEOUS at 15:05

## 2019-08-10 RX ADMIN — Medication 975 MILLIGRAM(S): at 13:21

## 2019-08-10 RX ADMIN — Medication 100 MILLIGRAM(S): at 13:21

## 2019-08-10 RX ADMIN — Medication 325 MILLIGRAM(S): at 06:43

## 2019-08-10 RX ADMIN — METHOCARBAMOL 750 MILLIGRAM(S): 500 TABLET, FILM COATED ORAL at 06:43

## 2019-08-10 RX ADMIN — Medication 975 MILLIGRAM(S): at 06:43

## 2019-08-10 RX ADMIN — PIPERACILLIN AND TAZOBACTAM 25 GRAM(S): 4; .5 INJECTION, POWDER, LYOPHILIZED, FOR SOLUTION INTRAVENOUS at 21:41

## 2019-08-10 RX ADMIN — PIPERACILLIN AND TAZOBACTAM 25 GRAM(S): 4; .5 INJECTION, POWDER, LYOPHILIZED, FOR SOLUTION INTRAVENOUS at 13:27

## 2019-08-10 RX ADMIN — Medication 3: at 11:57

## 2019-08-10 RX ADMIN — Medication 975 MILLIGRAM(S): at 14:30

## 2019-08-10 RX ADMIN — DULOXETINE HYDROCHLORIDE 60 MILLIGRAM(S): 30 CAPSULE, DELAYED RELEASE ORAL at 06:43

## 2019-08-10 RX ADMIN — Medication 1 APPLICATION(S): at 13:20

## 2019-08-10 RX ADMIN — HYDROMORPHONE HYDROCHLORIDE 4 MILLIGRAM(S): 2 INJECTION INTRAMUSCULAR; INTRAVENOUS; SUBCUTANEOUS at 17:55

## 2019-08-10 RX ADMIN — Medication 250 MILLIGRAM(S): at 06:43

## 2019-08-10 RX ADMIN — Medication 325 MILLIGRAM(S): at 17:06

## 2019-08-10 RX ADMIN — HYDROMORPHONE HYDROCHLORIDE 4 MILLIGRAM(S): 2 INJECTION INTRAMUSCULAR; INTRAVENOUS; SUBCUTANEOUS at 21:56

## 2019-08-10 RX ADMIN — Medication 20 MILLIGRAM(S): at 17:06

## 2019-08-10 RX ADMIN — Medication 975 MILLIGRAM(S): at 07:00

## 2019-08-10 RX ADMIN — HYDROMORPHONE HYDROCHLORIDE 4 MILLIGRAM(S): 2 INJECTION INTRAMUSCULAR; INTRAVENOUS; SUBCUTANEOUS at 18:54

## 2019-08-10 RX ADMIN — HYDROMORPHONE HYDROCHLORIDE 4 MILLIGRAM(S): 2 INJECTION INTRAMUSCULAR; INTRAVENOUS; SUBCUTANEOUS at 22:56

## 2019-08-10 RX ADMIN — METHOCARBAMOL 750 MILLIGRAM(S): 500 TABLET, FILM COATED ORAL at 21:41

## 2019-08-10 RX ADMIN — Medication 1 TABLET(S): at 13:27

## 2019-08-10 NOTE — PROGRESS NOTE ADULT - ASSESSMENT
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 Pt admitted with severe sepsis secondary to wet gangrene of RLE, complicated by maggot infestation of wound. Vascular sx consulted and pt s/p wound cleansing and went to the OR for emergent source control and s/p  Guillotine R BKA. Post operatively day #1 remains stable. Sepsis resolved post sx. Empirically on vanco/ zosyn, Bcx negative. Hospital course complicated by acute on chronic anemia in the setting of AOCD, s/p 2 u prbc on admission and 1 u prbc intraop with appropriate response to hgb, remains stable thereafter. FOBT pending but no evidence of active bleeding. Also noted with BRANT/ hyponatremia likely due to dehydration/sepsis, s/p ivf fluid resuscitation with improving renal function and sodium. Slow clinical improvement. ID, Podiatry, Vascular, PM and Psych consults noted. PT consult noted, recommends acute rehab. OT and PM&R consults noted. PM consulted noted, pain regimen adjusted s/p revision sx.     Severe sepsis 2/2 wet gangrene of the RLE with maggot infestation  - s/p emergent source control and s/p Guillotine BKA POD #9.  POD 3 s/p right AKA revision with proximal femoral nerve resection.  - no intra op complications   - currently with down trending leukocytosis to wnl   - elevated esr/ crp   - afebrile  and normotensive - B cx negative X 2  - c/w Zosyn 3.375 g IV q8h D#10  - d/c Vanco (MRSA negative)   - pain control as per pain mx, no role for suboxone until off of acute narcotics   - c/w Dilaudid 2mg PO q4hrs PRN moderate pain  - c/w Dilaudid 4mg PO q4hrs PRN severe pain   - c/w Dilaudid 0.5mg q6h IV x 2days   - c/w Robaxin 500mg PO q6hrs ATC  - c/w Gabapentin 300mg TID  - pt will not receive IV narcotics only sq given prior abuse   - c/w colace for prn constipation   - s/p tran post op   - c/w local wound care as per vascular sx   - Monitor BM - if worsening, will check for c.diff  - ID f/u noted and appreciated. Pt will c/w abx until 4 days s/p BKA revision - stop date 8/11   - vascular sx f/u  noted and appreciated.  POD 3 s/p right AKA revision with proximal femoral nerve resection. Amputee support team on board. Representatives from prosthetic devices at bedside this am.  - Podiatry consult noted and appreciated, C/w wound care   - Pain mx consult noted, avoid narcotics in IV form. Re-consulted s/p revision sx for possible adjustment of pain regimen.   - Psych consult noted and appreciated, will re consult due to concerns for depression and also when ready to transition pt to suboxone   -PT consult noted. Recommends acute rehab and also OT and PM&R consults.     Sinus tachycardia  - Stable now. If persists will consult Cardiology. Repeat EKG sinus tachycardia. -Lopressor 5mg IVP once for HR >120, to be given if tachycardia persists  - Pt denies CP, SOB. Repeat EKG done today sinus tachycardia. Unchanged from ekg on admission and 8/3  - On admission - sinus tachycardia w/ T wave inversion in leads V4-6 now with controlled HR initially likely due to sepsis   - admission Trop neg X2   - no evidence of any issues on tele so discontinued   - repeat Troponin 8/4 x 1 - negative  - TTE: Normal LVSF, EF 65-70; No right heart strain; Mild Pul HTN; mod enlarged LA  - started on low dose bb   - CTA chest w/ IV contrast - neg for PE    Depression  -Confirmed by PMD Dr. Pierce  -C/w Cymbalta 60mg BID    BRANT - resolved  - likely secondary to dehydration  -Improving renal fxn to wnl  -sodium now wnl  Tolerating PO intake  -Continue to monitor     B/l groin intertriginous dermatitis  -C/w Nystatin powder bid  -Continue to monitor for signs of infection     Hypocalcemia  -Ca 7.7  -Corrected Ca 9.4  -Continue to monitor     Acute on Chronic Anemia   - noted AOCD   - h/h remains stable   -Initial Hb 8.3, downtrended to 6.4   - likely bc pt was initially dehydrated   -s/p 2U PRBCs on admision and 1 u prbc intra op   - appropriate response to prbc transfusion   - FOBT pending, no evidence of bleeding   -Iron panel suggest AOCD   - will monitor hgb and transfuse for hgb <7   -C/w iron supplement, - c/w vitamin c     IDDM-2 poorly controlled  -HbA1c 7  - fs stable  - c/w accuchecks ACHS TID  - c/w HSS  - c/w hypoglycemia protocol     HLD  -C/W Lipitor PO QHS     H/o polysubstance use disorder - currently taking Suboxone  -Utox negative  -Last dose of Suboxone was day prior to hospitalization   -currently pt requiring narcotics for pain control   - labile emotions - psych reconsult for signs of depression  -Psych consult noted and appreciated, will reconsult when pt is off narcotics or defer to outpt management     Preventative Measures  DVT ppx: started Lovenox 40 - benefits outweigh risk. s/p BKA, immobile, sedentary, platelets stable, renal functions wnl.    Advanced Directive: Full code  Next of kin: Brother Jose    Dispo: Pt s/p Right BKA 8/1 and revision to R AKA on 8/7. Likely d/c to [facility]. Pt eval noted. OT and PM&R consults noted. 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 Pt admitted with severe sepsis secondary to wet gangrene of RLE, complicated by maggot infestation of wound. Vascular sx consulted and pt s/p wound cleansing and went to the OR for emergent source control and s/p  Guillotine R BKA. Post operatively day #1 remains stable. Sepsis resolved post sx. Empirically on vanco/ zosyn, Bcx negative. Hospital course complicated by acute on chronic anemia in the setting of AOCD, s/p 2 u prbc on admission and 1 u prbc intraop with appropriate response to hgb, remains stable thereafter. FOBT pending but no evidence of active bleeding. Also noted with BRANT/ hyponatremia likely due to dehydration/sepsis, s/p ivf fluid resuscitation with improving renal function and sodium. Slow clinical improvement. ID, Podiatry, Vascular, PM and Psych consults noted. PT consult noted, recommends acute rehab. OT and PM&R consults noted. PM consulted noted, pain regimen adjusted s/p revision sx.     Severe sepsis 2/2 wet gangrene of the RLE with maggot infestation  - s/p emergent source control and s/p Guillotine BKA POD #9.  POD 3 s/p right AKA revision with proximal femoral nerve resection.  - no intra op complications   - currently with down trending leukocytosis to wnl   - elevated esr/ crp   - afebrile  and normotensive - B cx negative X 2  - c/w Zosyn 3.375 g IV q8h D#10  - d/c Vanco (MRSA negative)   - pain control as per pain mx, no role for suboxone until off of acute narcotics   - c/w Dilaudid 2mg PO q4hrs PRN moderate pain  - c/w Dilaudid 4mg PO q4hrs PRN severe pain   - c/w Dilaudid 0.5mg q6h IV x 2days   - c/w Robaxin 500mg PO q6hrs ATC  - c/w Gabapentin 300mg TID  - pt will not receive IV narcotics only sq given prior abuse   - c/w colace for prn constipation   - s/p tran post op   - c/w local wound care as per vascular sx   - Monitor BM - if worsening, will check for c.diff  - ID f/u noted and appreciated. Pt will c/w abx until 4 days s/p BKA revision - stop date 8/11   - vascular sx f/u  noted and appreciated.  POD 3 s/p right AKA revision with proximal femoral nerve resection. Amputee support team on board. Representatives from prosthetic devices at bedside this am.  - Podiatry consult noted and appreciated, C/w wound care   - Pain mx consult noted, avoid narcotics in IV form. Re-consulted s/p revision sx for possible adjustment of pain regimen.   - Psych consult noted and appreciated, will re consult due to concerns for depression and also when ready to transition pt to suboxone   -PT consult noted. Recommends acute rehab and also OT and PM&R consults.     Sinus tachycardia  - Stable now. If persists will consult Cardiology. Repeat EKG sinus tachycardia. -Lopressor 5mg IVP once for HR >120, to be given if tachycardia persists  - Pt denies CP, SOB. Repeat EKG done today sinus tachycardia. Unchanged from ekg on admission and 8/3  - On admission - sinus tachycardia w/ T wave inversion in leads V4-6 now with controlled HR initially likely due to sepsis   - admission Trop neg X2   - no evidence of any issues on tele so discontinued   - repeat Troponin 8/4 x 1 - negative  - TTE: Normal LVSF, EF 65-70; No right heart strain; Mild Pul HTN; mod enlarged LA  - started on low dose bb   - CTA chest w/ IV contrast - neg for PE    Depression  -Confirmed by PMD Dr. Pierce  -C/w Cymbalta 60mg BID    BRANT - resolved  - likely secondary to dehydration  -Improving renal fxn to wnl  -sodium now wnl  Tolerating PO intake  -Continue to monitor     B/l groin intertriginous dermatitis  -C/w Nystatin powder bid  -Continue to monitor for signs of infection     Hypocalcemia  -Ca 7.7  -Corrected Ca 9.4  -Continue to monitor     Acute on Chronic Anemia   - noted AOCD   - h/h remains stable   -Initial Hb 8.3, downtrended to 6.4   - likely bc pt was initially dehydrated   -s/p 2U PRBCs on admision and 1 u prbc intra op   - appropriate response to prbc transfusion   - FOBT pending, no evidence of bleeding   -Iron panel suggest AOCD   - will monitor hgb and transfuse for hgb <7   -C/w iron supplement, - c/w vitamin c     IDDM-2 poorly controlled  -HbA1c 7  - fs stable  - c/w accuchecks ACHS TID  - c/w HSS  - c/w hypoglycemia protocol     HLD  -C/W Lipitor PO QHS     HTN  -C/w Metoprolol  -C/w Enalapril     H/o polysubstance use disorder - currently taking Suboxone  -Utox negative  -Last dose of Suboxone was day prior to hospitalization   -currently pt requiring narcotics for pain control   - labile emotions - psych reconsult for signs of depression  -Psych consult noted and appreciated, will reconsult when pt is off narcotics or defer to outpt management     Preventative Measures  DVT ppx: started Lovenox 40 - benefits outweigh risk. s/p BKA, immobile, sedentary, platelets stable, renal functions wnl.    Advanced Directive: Full code  Next of kin: Brother Jose    Dispo: Pt s/p Right BKA 8/1 and revision to R AKA on 8/7. Likely d/c to [facility]. Pt eval noted. OT and PM&R consults noted, pt needs facility that accepts Suboxone use. 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 Pt admitted with severe sepsis secondary to wet gangrene of RLE, complicated by maggot infestation of wound. Vascular sx consulted and pt s/p wound cleansing and went to the OR for emergent source control and s/p  Guillotine R BKA. Sepsis resolved post sx. Empirically on vanco/ zosyn, Bcx negative. Hospital course complicated by acute on chronic anemia in the setting of AOCD, s/p 2 u prbc on admission and 1 u prbc intraop with appropriate response to hgb, remains stable thereafter. FOBT pending but no evidence of active bleeding. Also noted with BRANT/ hyponatremia likely due to dehydration, s/p ivf fluid resuscitation with improving renal function and sodium. Slow clinical improvement. ID, Podiatry, Vascular, PM and Psych consults noted. PT consult noted, recommends acute rehab. OT and PM&R consults noted. PM consulted noted, pain regimen adjusted s/p revision sx.     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  POD 3 s/p right AKA revision with proximal femoral nerve resection.  - currently with down trending leukocytosis to wnl   - elevated esr/ crp   - afebrile  and normotensive - B cx negative X 2  - c/w Zosyn 3.375 g IV q8h D#10, off vanco   - pain control as per pain mx, no role for suboxone until off of acute narcotics   - c/w Dilaudid on a sliding scale, robaxin and gabapentin   - pt will not receive IV narcotics only sq given prior abuse   - c/w colace for prn constipation   - c/w local wound care as per vascular sx   - Monitor BM - if worsening, will check for c.diff  - ID f/u noted and appreciated. Pt will c/w abx until 4 days s/p BKA revision - stop date 8/11   - vascular sx f/u  noted and appreciated.  POD 3 s/p right AKA revision with proximal femoral nerve resection. Amputee support team on board. Representatives from prosthetic devices at bedside this am.  - Podiatry, pain management and psych also on board   -PT consult noted. Recommends acute rehab and also OT and PM&R consults.     Sinus tachycardia  - Stable now. If persists will consult Cardiology. Repeat EKG sinus tachycardia. -Lopressor 5mg IVP once for HR >120, to be given if tachycardia persists  - Pt denies CP, SOB. Repeat EKG done today sinus tachycardia. Unchanged from ekg on admission and 8/3  - TTE: Normal LVSF, EF 65-70; No right heart strain; Mild Pul HTN; mod enlarged LA  -c/w low dose bb     Depression  -Confirmed by PMD Dr. Pierce  -C/w Cymbalta 60mg BID; seen by psych, will re consult due to concerns for depression and also when ready to transition pt to suboxone     BRANT - resolved  - likely secondary to dehydration; severe sepsis now resolved   -Improving renal fxn to wnl  -sodium now wnl  Tolerating PO intake    B/l groin intertriginous dermatitis  -C/w Nystatin powder bid  -Continue to monitor for signs of infection     Hypocalcemia  -corrected, will monitor. Now calcium level noted, will add albumin    Acute on Chronic Anemia   - noted AOCD, now stable   - h/h remains stable   -Initial Hb 8.3, downtrended to 6.4   - likely bc pt was initially dehydrated   -s/p 2U PRBCs on admision and 1 u prbc intra op   - FOBT pending, no evidence of bleeding   -Iron panel suggest AOCD   - will monitor hgb and transfuse for hgb <7   -C/w iron supplement, - c/w vitamin c     IDDM-2 poorly controlled  -HbA1c 7  - c/w accuchecks ACHS TID  - c/w hypoglycemia protocol     HTN/HLD  -C/w Metoprolol  -C/w Enalapril   -c/w lipitor     H/o polysubstance use disorder - currently taking Suboxone  -Utox negative  -Last dose of Suboxone was day prior to hospitalization   -currently pt requiring narcotics for pain control   - labile emotions - psych reconsult for signs of depression  -Psych consult noted and appreciated, will reconsult when pt is off narcotics or defer to outpt management     Preventative Measures  DVT ppx: started Lovenox 40 - benefits outweigh risk. s/p BKA, immobile, sedentary, platelets stable, renal functions wnl.    Advanced Directive: Full code  Next of kin: Brother Jose    Dispo:  Likely d/c to [facility]. Pt eval noted. OT and PM&R consults noted, pt needs facility that accepts Suboxone use. 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 Pt admitted with severe sepsis secondary to wet gangrene of RLE, complicated by maggot infestation of wound. Vascular sx consulted and pt s/p wound cleansing and went to the OR for emergent source control and s/p  Guillotine R BKA. Sepsis resolved post sx. Empirically on vanco/ zosyn, Bcx negative. Hospital course complicated by acute on chronic anemia in the setting of AOCD, s/p 2 u prbc on admission and 1 u prbc intraop with appropriate response to hgb, remains stable thereafter. FOBT pending but no evidence of active bleeding. Also noted with BRANT/ hyponatremia likely due to dehydration, s/p ivf fluid resuscitation with improving renal function and sodium. Slow clinical improvement. ID, Podiatry, Vascular, PM and Psych consults noted. PT consult noted, recommends acute rehab. OT and PM&R consults noted. PM consulted noted, pain regimen adjusted s/p revision sx.     Wet gangrene of the RLE with maggot infestation   Initially presenting with severe sepsis + reactive thrombocytosis   - s/p emergent source control and s/p Guillotine BKA POD #9.  POD 3 s/p right AKA revision with proximal femoral nerve resection.  - currently with down trending leukocytosis to wnl   - elevated esr/ crp   - afebrile  and normotensive - B cx negative X 2  - c/w Zosyn 3.375 g IV q8h D#10, off vanco   - pain control as per pain mx, no role for suboxone until off of acute narcotics   - c/w Dilaudid on a sliding scale, robaxin and gabapentin   - pt will not receive IV narcotics only sq given prior abuse   - c/w colace for prn constipation   - c/w local wound care as per vascular sx   - Monitor BM - if worsening, will check for c.diff  - ID f/u noted and appreciated. Pt will c/w abx until 4 days s/p BKA revision - stop date 8/11   - vascular sx f/u  noted and appreciated.  POD 3 s/p right AKA revision with proximal femoral nerve resection. Amputee support team on board. Representatives from prosthetic devices at bedside this am.  - Podiatry, pain management and psych also on board   -PT consult noted. Recommends acute rehab and also OT and PM&R consults.     Sinus tachycardia  - Stable now. If persists will consult Cardiology. Repeat EKG sinus tachycardia. -Lopressor 5mg IVP once for HR >120, to be given if tachycardia persists  - Pt denies CP, SOB. Repeat EKG done today sinus tachycardia. Unchanged from ekg on admission and 8/3  - TTE: Normal LVSF, EF 65-70; No right heart strain; Mild Pul HTN; mod enlarged LA  -c/w low dose bb     Depression  -Confirmed by PMD Dr. Pierce  -C/w Cymbalta 60mg BID; seen by psych, will re consult due to concerns for depression and also when ready to transition pt to suboxone     BRANT - resolved  - likely secondary to dehydration; severe sepsis now resolved   -Improving renal fxn to wnl  -sodium now wnl  Tolerating PO intake    B/l groin intertriginous dermatitis  -C/w Nystatin powder bid  -Continue to monitor for signs of infection     Hypocalcemia  -corrected, will monitor. Now calcium level noted, will add albumin    Acute on Chronic Anemia   - noted AOCD, now stable   - h/h remains stable   -Initial Hb 8.3, downtrended to 6.4   - likely bc pt was initially dehydrated   -s/p 2U PRBCs on admision and 1 u prbc intra op   - FOBT pending, no evidence of bleeding   -Iron panel suggest AOCD   - will monitor hgb and transfuse for hgb <7   -C/w iron supplement, - c/w vitamin c     IDDM-2 poorly controlled  -HbA1c 7  - c/w accuchecks ACHS TID  - c/w hypoglycemia protocol     HTN/HLD  -C/w Metoprolol  -C/w Enalapril   -c/w lipitor     H/o polysubstance use disorder - currently taking Suboxone  -Utox negative  -Last dose of Suboxone was day prior to hospitalization   -currently pt requiring narcotics for pain control   - labile emotions - psych reconsult for signs of depression  -Psych consult noted and appreciated, will reconsult when pt is off narcotics or defer to outpt management     Preventative Measures  DVT ppx: started Lovenox 40 - benefits outweigh risk. s/p BKA, immobile, sedentary, platelets stable, renal functions wnl.    Advanced Directive: Full code  Next of kin: Brother Jose    Dispo:  Likely d/c to [facility]. Pt eval noted. OT and PM&R consults noted, pt needs facility that accepts Suboxone use.

## 2019-08-10 NOTE — PROGRESS NOTE ADULT - SUBJECTIVE AND OBJECTIVE BOX
Patient is a 54y old  Male who presents with a chief complaint of sepsis, foot wound (01 Aug 2019 13:18) s/p BKA controlling pain     Overnight AM events:  Patient seen and examined at beside. No acute events over night. POD #9 s/p Right BKA and POD 3 s/p right AKA revision with proximal femoral nerve resection. Pt was complaining of pain, he was receiving his morning meds. Pt is tolerating PO diet w/o n/v/diarrhea. Pt is voiding and last BM was this am.    ROS: Denies CP, HA, SOB, n/v/, fever/chills, abdominal/back pain, vision changes, numbness/tingling, blood in urine or stool     CAPILLARY BLOOD GLUCOSE  POCT  Blood Glucose (08.09.19 @ 22:48)    POCT Blood Glucose.: 253 mg/dL  POCT  Blood Glucose (08.09.19 @ 17:05)    POCT Blood Glucose.: 242 mg/dL  POCT  Blood Glucose (08.09.19 @ 12:10)    POCT Blood Glucose.: 243 mg/dL    Physical Examination  Vital Signs Last 24 Hrs  T(C): 36.4 (09 Aug 2019 17:11), Max: 36.6 (09 Aug 2019 08:44)  T(F): 97.5 (09 Aug 2019 17:11), Max: 97.9 (09 Aug 2019 08:44)  HR: 112 (10 Aug 2019 06:24) (103 - 121)  BP: 160/99 (10 Aug 2019 06:24) (151/90 - 160/99)  RR: 18 (10 Aug 2019 06:24) (18 - 20)  SpO2: 96% (10 Aug 2019 06:24) (96% - 97%)    GENERAL: Obese, WD WN, poor hygiene   HEENT: PERRLA, pupil constricted to light b/l, poor dentition   RESP: CTA b/l,, no crackles, no wheezing or rhonchi   CVS: RRR, Normal S1 & S2, No m/r/g  Abdomen: soft +BS normoactive, NT, ND, b/l groin intertriginous dermatitis - no signs of infection   Extremities: Rt foot s/p R AKA, L foot - superficial ulcer 4x5cm, no active drainage, s/p partial amputation of foot clean dry no evidence of infection   Neuro: AAOX3, non focal     LAB                        8.2    7.86  )-----------( 438      ( 09 Aug 2019 07:52 )             28.0     08-08    136  |  104  |  21.0<H>  ----------------------------<  230<H>  4.0   |  22.0  |  0.89    Ca    7.7<L>      08 Aug 2019 07:33    Phos  2.8     08-05  Mg     1.3     08-05    TPro  6.2<L>  /  Alb  1.9<L>  /  TBili  0.4  /  DBili  x   /  AST  27  /  ALT  18  /  AlkPhos  774<H>  08-05      IMAGING  < from: TTE Echo Complete w/Doppler (08.03.19 @ 13:18) >  Summary:   1. Technically limited study.   2. Normal left ventricular internal cavity size.   3. Normal global left ventricular systolic function.   4. Left ventricular ejection fraction, by visual estimation, is 65 to   70%.   5. The left ventricular diastolic function could not be assessed in this   study.   6. Normal right ventricular size and function.   7. Moderately enlarged left atrium.   8. Normal trileaflet aortic valve with normal opening.   9. Estimated pulmonary artery systolic pressure is 45.4 mmHg assuming a   right atrial pressure of 5 mmHg, which is consistent with mild pulmonary   hypertension.  10. There is no evidence of pericardial effusion.    < end of copied text >      < from: Xray Ankle Complete 3 Views, Right (07.31.19 @ 22:25) >  IMPRESSION:   Soft tissue swelling, ulcerations consistent with diffuse   osteomyelitis involving the phalanges and metatarsal bones tarsal bones   possibly ankle joint/show bilateral medial malleolus.    < end of copied text >    < from: Xray Foot AP + Lateral + Oblique, Right (07.31.19 @ 22:25) >  MPRESSION:   Soft tissue swelling, ulcerations consistent with diffuse   osteomyelitis involving the phalanges and metatarsal bones tarsal bones   possibly ankle joint/show bilateral medial malleolus.    < end of copied text >    < from: Xray Chest 1 View-PORTABLE IMMEDIATE (07.31.19 @ 20:04) >  Impression:  No evidence of acute cardiopulmonary disease. Allowing for differences in   technique, no significant change since 5/11/2016..    < end of copied text >        MEDICATIONS  (STANDING):  ascorbic acid 500 milliGRAM(s) Oral daily  dextrose 5%. 1000 milliLiter(s) (50 mL/Hr) IV Continuous <Continuous>  dextrose 50% Injectable 12.5 Gram(s) IV Push once  dextrose 50% Injectable 25 Gram(s) IV Push once  dextrose 50% Injectable 25 Gram(s) IV Push once  enoxaparin Injectable 40 milliGRAM(s) SubCutaneous daily  ferrous    sulfate 325 milliGRAM(s) Oral four times a day  folic acid 1 milliGRAM(s) Oral daily  gabapentin 300 milliGRAM(s) Oral two times a day  insulin lispro (HumaLOG) corrective regimen sliding scale   SubCutaneous three times a day before meals  metoprolol tartrate 25 milliGRAM(s) Oral two times a day  multivitamin 1 Tablet(s) Oral daily  pantoprazole    Tablet 40 milliGRAM(s) Oral before breakfast  piperacillin/tazobactam IVPB.. 3.375 Gram(s) IV Intermittent every 8 hours  potassium chloride    Tablet ER 40 milliEquivalent(s) Oral every 4 hours  saccharomyces boulardii 250 milliGRAM(s) Oral two times a day  silver sulfADIAZINE 1% Cream 1 Application(s) Topical daily  sodium chloride 0.9%. 1000 milliLiter(s) (75 mL/Hr) IV Continuous <Continuous>  thiamine 100 milliGRAM(s) Oral daily  vancomycin  IVPB 750 milliGRAM(s) IV Intermittent every 12 hours    MEDICATIONS  (PRN):  aluminum hydroxide/magnesium hydroxide/simethicone Suspension 30 milliLiter(s) Oral every 6 hours PRN Dyspepsia  dextrose 40% Gel 15 Gram(s) Oral once PRN Blood Glucose LESS THAN 70 milliGRAM(s)/deciliter  glucagon  Injectable 1 milliGRAM(s) IntraMuscular once PRN Glucose LESS THAN 70 milligrams/deciliter  HYDROmorphone   Tablet 4 milliGRAM(s) Oral every 3 hours PRN Moderate Pain (4 - 6)  HYDROmorphone   Tablet 6 milliGRAM(s) Oral every 6 hours PRN Severe Pain (7 - 10)  HYDROmorphone  Injectable 1 milliGRAM(s) SubCutaneous every 6 hours PRN Severe Pain persisting 1 hour after oral opioids  ketorolac 10 milliGRAM(s) Oral every 8 hours PRN Mild Pain (1 - 3) Patient is a 54y old  Male who presents with a chief complaint of sepsis, foot wound (01 Aug 2019 13:18) s/p BKA controlling pain     Overnight AM events:  Patient seen and examined at beside. No acute events over night. POD #9 s/p Right BKA and POD 3 s/p right AKA revision with proximal femoral nerve resection. Pt was complaining of pain, he was receiving his morning meds. Pt is tolerating PO diet w/o n/v/diarrhea. Pt is voiding and last BM was this am.    ROS: Denies CP, HA, SOB, n/v/, fever/chills, abdominal/back pain, vision changes, numbness/tingling, blood in urine or stool. All other ROS negative     CAPILLARY BLOOD GLUCOSE  POCT  Blood Glucose (08.09.19 @ 22:48)    POCT Blood Glucose.: 253 mg/dL  POCT  Blood Glucose (08.09.19 @ 17:05)    POCT Blood Glucose.: 242 mg/dL  POCT  Blood Glucose (08.09.19 @ 12:10)    POCT Blood Glucose.: 243 mg/dL    Physical Examination  Vital Signs Last 24 Hrs  T(C): 36.4 (09 Aug 2019 17:11), Max: 36.6 (09 Aug 2019 08:44)  T(F): 97.5 (09 Aug 2019 17:11), Max: 97.9 (09 Aug 2019 08:44)  HR: 112 (10 Aug 2019 06:24) (103 - 121)  BP: 160/99 (10 Aug 2019 06:24) (151/90 - 160/99)  RR: 18 (10 Aug 2019 06:24) (18 - 20)  SpO2: 96% (10 Aug 2019 06:24) (96% - 97%)    GENERAL: Obese, WD WN, poor hygiene   HEENT: PERRLA, pupil constricted to light b/l, poor dentition   RESP: CTA b/l,, no crackles, no wheezing or rhonchi   CVS: RRR, Normal S1 & S2, No m/r/g  GI: soft +BS normoactive, NT, ND, b/l groin intertriginous dermatitis - no signs of infection   Extremities: Rt foot s/p R AKA, L foot - superficial ulcer 4x5cm, no active drainage, s/p partial amputation of foot clean dry no evidence of infection   Neuro: AAOX3, non focal   Skin: as above     LAB                        8.2    7.52  )-----------( 436      ( 10 Aug 2019 08:20 )             27.8   08-10    140  |  104  |  12.0  ----------------------------<  183<H>  4.0   |  26.0  |  0.50    Ca    8.2<L>      10 Aug 2019 08:20      IMAGING  < from: TTE Echo Complete w/Doppler (08.03.19 @ 13:18) >  Summary:   1. Technically limited study.   2. Normal left ventricular internal cavity size.   3. Normal global left ventricular systolic function.   4. Left ventricular ejection fraction, by visual estimation, is 65 to   70%.   5. The left ventricular diastolic function could not be assessed in this   study.   6. Normal right ventricular size and function.   7. Moderately enlarged left atrium.   8. Normal trileaflet aortic valve with normal opening.   9. Estimated pulmonary artery systolic pressure is 45.4 mmHg assuming a   right atrial pressure of 5 mmHg, which is consistent with mild pulmonary   hypertension.  10. There is no evidence of pericardial effusion.    < end of copied text >      < from: Xray Ankle Complete 3 Views, Right (07.31.19 @ 22:25) >  IMPRESSION:   Soft tissue swelling, ulcerations consistent with diffuse   osteomyelitis involving the phalanges and metatarsal bones tarsal bones   possibly ankle joint/show bilateral medial malleolus.    < end of copied text >    < from: Xray Foot AP + Lateral + Oblique, Right (07.31.19 @ 22:25) >  MPRESSION:   Soft tissue swelling, ulcerations consistent with diffuse   osteomyelitis involving the phalanges and metatarsal bones tarsal bones   possibly ankle joint/show bilateral medial malleolus.    < end of copied text >    < from: Xray Chest 1 View-PORTABLE IMMEDIATE (07.31.19 @ 20:04) >  Impression:  No evidence of acute cardiopulmonary disease. Allowing for differences in   technique, no significant change since 5/11/2016..    < end of copied text >        MEDICATIONS  (STANDING):  ascorbic acid 500 milliGRAM(s) Oral daily  dextrose 5%. 1000 milliLiter(s) (50 mL/Hr) IV Continuous <Continuous>  dextrose 50% Injectable 12.5 Gram(s) IV Push once  dextrose 50% Injectable 25 Gram(s) IV Push once  dextrose 50% Injectable 25 Gram(s) IV Push once  enoxaparin Injectable 40 milliGRAM(s) SubCutaneous daily  ferrous    sulfate 325 milliGRAM(s) Oral four times a day  folic acid 1 milliGRAM(s) Oral daily  gabapentin 300 milliGRAM(s) Oral two times a day  insulin lispro (HumaLOG) corrective regimen sliding scale   SubCutaneous three times a day before meals  metoprolol tartrate 25 milliGRAM(s) Oral two times a day  multivitamin 1 Tablet(s) Oral daily  pantoprazole    Tablet 40 milliGRAM(s) Oral before breakfast  piperacillin/tazobactam IVPB.. 3.375 Gram(s) IV Intermittent every 8 hours  potassium chloride    Tablet ER 40 milliEquivalent(s) Oral every 4 hours  saccharomyces boulardii 250 milliGRAM(s) Oral two times a day  silver sulfADIAZINE 1% Cream 1 Application(s) Topical daily  sodium chloride 0.9%. 1000 milliLiter(s) (75 mL/Hr) IV Continuous <Continuous>  thiamine 100 milliGRAM(s) Oral daily  vancomycin  IVPB 750 milliGRAM(s) IV Intermittent every 12 hours    MEDICATIONS  (PRN):  aluminum hydroxide/magnesium hydroxide/simethicone Suspension 30 milliLiter(s) Oral every 6 hours PRN Dyspepsia  dextrose 40% Gel 15 Gram(s) Oral once PRN Blood Glucose LESS THAN 70 milliGRAM(s)/deciliter  glucagon  Injectable 1 milliGRAM(s) IntraMuscular once PRN Glucose LESS THAN 70 milligrams/deciliter  HYDROmorphone   Tablet 4 milliGRAM(s) Oral every 3 hours PRN Moderate Pain (4 - 6)  HYDROmorphone   Tablet 6 milliGRAM(s) Oral every 6 hours PRN Severe Pain (7 - 10)  HYDROmorphone  Injectable 1 milliGRAM(s) SubCutaneous every 6 hours PRN Severe Pain persisting 1 hour after oral opioids  ketorolac 10 milliGRAM(s) Oral every 8 hours PRN Mild Pain (1 - 3)

## 2019-08-10 NOTE — PROGRESS NOTE ADULT - SUBJECTIVE AND OBJECTIVE BOX
HPI/OVERNIGHT EVENTS: Patient seen and examined at bedside. AKA dressing to be changed this am. Patient remains tachy with otherwise normal vitals  Denies fever, chills, nausea, vomitting, chest pain, SOB, dizziness, abd pain or any other concerning symptoms    Vital Signs Last 24 Hrs  T(C): 36.4 (09 Aug 2019 17:11), Max: 36.6 (09 Aug 2019 08:44)  T(F): 97.5 (09 Aug 2019 17:11), Max: 97.9 (09 Aug 2019 08:44)  HR: 121 (09 Aug 2019 17:11) (103 - 121)  BP: 151/90 (09 Aug 2019 08:44) (151/90 - 164/99)  BP(mean): --  RR: 20 (09 Aug 2019 17:11) (20 - 20)  SpO2: 97% (09 Aug 2019 17:11) (97% - 97%)    I&O's Detail    08 Aug 2019 07:01  -  09 Aug 2019 07:00  --------------------------------------------------------  IN:    multiple electrolytes Injection Type 1: 400 mL    Oral Fluid: 480 mL    Solution: 200 mL  Total IN: 1080 mL    OUT:    Voided: 2400 mL  Total OUT: 2400 mL    Total NET: -1320 mL      09 Aug 2019 07:01  -  10 Aug 2019 01:46  --------------------------------------------------------  IN:  Total IN: 0 mL    OUT:    Voided: 2800 mL  Total OUT: 2800 mL    Total NET: -2800 mL      Physical Exam:   Gen: NAD lying in bed  Ext: Right AKA with clean dressing intact, covered with iodoform.  Right thigh is soft and compressible.  Left TMA with dressing in place.    LABS:                        8.2    7.86  )-----------( 438      ( 09 Aug 2019 07:52 )             28.0     08-09    139  |  105  |  18.0  ----------------------------<  198<H>  4.1   |  24.0  |  0.68    Ca    8.1<L>      09 Aug 2019 07:52            MEDICATIONS  (STANDING):  acetaminophen   Tablet .. 975 milliGRAM(s) Oral every 8 hours  ascorbic acid 500 milliGRAM(s) Oral daily  atorvastatin 40 milliGRAM(s) Oral at bedtime  dextrose 5%. 1000 milliLiter(s) (50 mL/Hr) IV Continuous <Continuous>  dextrose 50% Injectable 12.5 Gram(s) IV Push once  dextrose 50% Injectable 25 Gram(s) IV Push once  dextrose 50% Injectable 25 Gram(s) IV Push once  DULoxetine 60 milliGRAM(s) Oral two times a day  enoxaparin Injectable 40 milliGRAM(s) SubCutaneous daily  ferrous    sulfate 325 milliGRAM(s) Oral four times a day  folic acid 1 milliGRAM(s) Oral daily  gabapentin 300 milliGRAM(s) Oral three times a day  insulin lispro (HumaLOG) corrective regimen sliding scale   SubCutaneous three times a day before meals  methocarbamol 750 milliGRAM(s) Oral every 8 hours  metoprolol tartrate 50 milliGRAM(s) Oral two times a day  multiple electrolytes Injection Type 1 1000 milliLiter(s) (100 mL/Hr) IV Continuous <Continuous>  multivitamin 1 Tablet(s) Oral daily  pantoprazole    Tablet 40 milliGRAM(s) Oral before breakfast  piperacillin/tazobactam IVPB.. 3.375 Gram(s) IV Intermittent every 8 hours  saccharomyces boulardii 250 milliGRAM(s) Oral two times a day  silver sulfADIAZINE 1% Cream 1 Application(s) Topical daily  thiamine 100 milliGRAM(s) Oral daily    MEDICATIONS  (PRN):  aluminum hydroxide/magnesium hydroxide/simethicone Suspension 30 milliLiter(s) Oral every 6 hours PRN Dyspepsia  dextrose 40% Gel 15 Gram(s) Oral once PRN Blood Glucose LESS THAN 70 milliGRAM(s)/deciliter  glucagon  Injectable 1 milliGRAM(s) IntraMuscular once PRN Glucose LESS THAN 70 milligrams/deciliter  HYDROmorphone   Tablet 2 milliGRAM(s) Oral every 4 hours PRN Moderate Pain (4 - 6)  HYDROmorphone   Tablet 4 milliGRAM(s) Oral every 4 hours PRN Severe Pain (7 - 10)  HYDROmorphone  Injectable 0.5 milliGRAM(s) SubCutaneous every 6 hours PRN Severe Pain unrelieved by oral opioids after 1 hour  metoprolol tartrate IVPB 5 milliGRAM(s) IV Intermittent once PRN HR greater than 120  zolpidem 5 milliGRAM(s) Oral at bedtime PRN Insomnia HPI/OVERNIGHT EVENTS: Patient seen and examined at bedside. AKA dressing changed this AM, which worsened his pain. Given extra 1 time dose of Dilaudid .5mcg Wound appears clean, dry, and nonerythematous.  Patient remains tachy with otherwise normal vitals  Denies fever, chills, nausea, vomiting, chest pain, SOB, dizziness, abd pain or any other concerning symptoms    Vital Signs Last 24 Hrs  T(C): 36.4 (09 Aug 2019 17:11), Max: 36.6 (09 Aug 2019 08:44)  T(F): 97.5 (09 Aug 2019 17:11), Max: 97.9 (09 Aug 2019 08:44)  HR: 121 (09 Aug 2019 17:11) (103 - 121)  BP: 151/90 (09 Aug 2019 08:44) (151/90 - 164/99)  BP(mean): --  RR: 20 (09 Aug 2019 17:11) (20 - 20)  SpO2: 97% (09 Aug 2019 17:11) (97% - 97%)    I&O's Detail    08 Aug 2019 07:01  -  09 Aug 2019 07:00  --------------------------------------------------------  IN:    multiple electrolytes Injection Type 1: 400 mL    Oral Fluid: 480 mL    Solution: 200 mL  Total IN: 1080 mL    OUT:    Voided: 2400 mL  Total OUT: 2400 mL    Total NET: -1320 mL      09 Aug 2019 07:01  -  10 Aug 2019 01:46  --------------------------------------------------------  IN:  Total IN: 0 mL    OUT:    Voided: 2800 mL  Total OUT: 2800 mL    Total NET: -2800 mL      Physical Exam:   Gen: NAD lying in bed. alert and oriented.  Ext: Right AKA incision clean, dry, without drainage, and non-erythematous. Staples in place.  Right thigh is soft and compressible.  Left TMA with dressing in place.    LABS:                        8.2    7.86  )-----------( 438      ( 09 Aug 2019 07:52 )             28.0     08-09    139  |  105  |  18.0  ----------------------------<  198<H>  4.1   |  24.0  |  0.68    Ca    8.1<L>      09 Aug 2019 07:52            MEDICATIONS  (STANDING):  acetaminophen   Tablet .. 975 milliGRAM(s) Oral every 8 hours  ascorbic acid 500 milliGRAM(s) Oral daily  atorvastatin 40 milliGRAM(s) Oral at bedtime  dextrose 5%. 1000 milliLiter(s) (50 mL/Hr) IV Continuous <Continuous>  dextrose 50% Injectable 12.5 Gram(s) IV Push once  dextrose 50% Injectable 25 Gram(s) IV Push once  dextrose 50% Injectable 25 Gram(s) IV Push once  DULoxetine 60 milliGRAM(s) Oral two times a day  enoxaparin Injectable 40 milliGRAM(s) SubCutaneous daily  ferrous    sulfate 325 milliGRAM(s) Oral four times a day  folic acid 1 milliGRAM(s) Oral daily  gabapentin 300 milliGRAM(s) Oral three times a day  insulin lispro (HumaLOG) corrective regimen sliding scale   SubCutaneous three times a day before meals  methocarbamol 750 milliGRAM(s) Oral every 8 hours  metoprolol tartrate 50 milliGRAM(s) Oral two times a day  multiple electrolytes Injection Type 1 1000 milliLiter(s) (100 mL/Hr) IV Continuous <Continuous>  multivitamin 1 Tablet(s) Oral daily  pantoprazole    Tablet 40 milliGRAM(s) Oral before breakfast  piperacillin/tazobactam IVPB.. 3.375 Gram(s) IV Intermittent every 8 hours  saccharomyces boulardii 250 milliGRAM(s) Oral two times a day  silver sulfADIAZINE 1% Cream 1 Application(s) Topical daily  thiamine 100 milliGRAM(s) Oral daily    MEDICATIONS  (PRN):  aluminum hydroxide/magnesium hydroxide/simethicone Suspension 30 milliLiter(s) Oral every 6 hours PRN Dyspepsia  dextrose 40% Gel 15 Gram(s) Oral once PRN Blood Glucose LESS THAN 70 milliGRAM(s)/deciliter  glucagon  Injectable 1 milliGRAM(s) IntraMuscular once PRN Glucose LESS THAN 70 milligrams/deciliter  HYDROmorphone   Tablet 2 milliGRAM(s) Oral every 4 hours PRN Moderate Pain (4 - 6)  HYDROmorphone   Tablet 4 milliGRAM(s) Oral every 4 hours PRN Severe Pain (7 - 10)  HYDROmorphone  Injectable 0.5 milliGRAM(s) SubCutaneous every 6 hours PRN Severe Pain unrelieved by oral opioids after 1 hour  metoprolol tartrate IVPB 5 milliGRAM(s) IV Intermittent once PRN HR greater than 120  zolpidem 5 milliGRAM(s) Oral at bedtime PRN Insomnia

## 2019-08-10 NOTE — PROGRESS NOTE ADULT - ASSESSMENT
55 y/o M POD 2  s/p right AKA with proximal femoral nerve resection; feeling well this morning; patient's pain is well-controlled.  - ID recs   - piperacillin/tazobactam IVPB.. 3.375 Gram(s) IV Intermittent every 8 hours end date 8/11  - DVT ppx  - PMNR recs [facility] vs acute rehab  - pain per Pain management   - DM managment  - incentive spirometry 55 y/o M POD 2  s/p right AKA with proximal femoral nerve resection; feeling well this morning; patient's pain was worsened by dressing change, now controlled.     - Daily AKA dressing changes (xeroform, ABD, and ACE wrap)  - ID recs   - piperacillin/tazobactam IVPB.. 3.375 Gram(s) IV Intermittent every 8 hours end date 8/11  - DVT ppx  - PMNR recs [facility] vs acute rehab  - pain per Pain management   - DM managment  - incentive spirometry

## 2019-08-11 LAB
ALBUMIN SERPL ELPH-MCNC: 2.4 G/DL — LOW (ref 3.3–5.2)
ALP SERPL-CCNC: 285 U/L — HIGH (ref 40–120)
ALT FLD-CCNC: 10 U/L — SIGNIFICANT CHANGE UP
ANION GAP SERPL CALC-SCNC: 11 MMOL/L — SIGNIFICANT CHANGE UP (ref 5–17)
AST SERPL-CCNC: 14 U/L — SIGNIFICANT CHANGE UP
BASOPHILS # BLD AUTO: 0.11 K/UL — SIGNIFICANT CHANGE UP (ref 0–0.2)
BASOPHILS NFR BLD AUTO: 1.5 % — SIGNIFICANT CHANGE UP (ref 0–2)
BILIRUB SERPL-MCNC: 0.2 MG/DL — LOW (ref 0.4–2)
BUN SERPL-MCNC: 10 MG/DL — SIGNIFICANT CHANGE UP (ref 8–20)
CALCIUM SERPL-MCNC: 8.1 MG/DL — LOW (ref 8.6–10.2)
CHLORIDE SERPL-SCNC: 105 MMOL/L — SIGNIFICANT CHANGE UP (ref 98–107)
CO2 SERPL-SCNC: 26 MMOL/L — SIGNIFICANT CHANGE UP (ref 22–29)
CREAT SERPL-MCNC: 0.52 MG/DL — SIGNIFICANT CHANGE UP (ref 0.5–1.3)
EOSINOPHIL # BLD AUTO: 0.25 K/UL — SIGNIFICANT CHANGE UP (ref 0–0.5)
EOSINOPHIL NFR BLD AUTO: 3.4 % — SIGNIFICANT CHANGE UP (ref 0–6)
GLUCOSE BLDC GLUCOMTR-MCNC: 173 MG/DL — HIGH (ref 70–99)
GLUCOSE BLDC GLUCOMTR-MCNC: 228 MG/DL — HIGH (ref 70–99)
GLUCOSE BLDC GLUCOMTR-MCNC: 283 MG/DL — HIGH (ref 70–99)
GLUCOSE BLDC GLUCOMTR-MCNC: 292 MG/DL — HIGH (ref 70–99)
GLUCOSE SERPL-MCNC: 218 MG/DL — HIGH (ref 70–115)
HCT VFR BLD CALC: 29.4 % — LOW (ref 39–50)
HGB BLD-MCNC: 8.7 G/DL — LOW (ref 13–17)
IMM GRANULOCYTES NFR BLD AUTO: 0.5 % — SIGNIFICANT CHANGE UP (ref 0–1.5)
LYMPHOCYTES # BLD AUTO: 0.97 K/UL — LOW (ref 1–3.3)
LYMPHOCYTES # BLD AUTO: 13 % — SIGNIFICANT CHANGE UP (ref 13–44)
MAGNESIUM SERPL-MCNC: 1.1 MG/DL — LOW (ref 1.6–2.6)
MCHC RBC-ENTMCNC: 26.4 PG — LOW (ref 27–34)
MCHC RBC-ENTMCNC: 29.6 GM/DL — LOW (ref 32–36)
MCV RBC AUTO: 89.1 FL — SIGNIFICANT CHANGE UP (ref 80–100)
MONOCYTES # BLD AUTO: 0.47 K/UL — SIGNIFICANT CHANGE UP (ref 0–0.9)
MONOCYTES NFR BLD AUTO: 6.3 % — SIGNIFICANT CHANGE UP (ref 2–14)
NEUTROPHILS # BLD AUTO: 5.61 K/UL — SIGNIFICANT CHANGE UP (ref 1.8–7.4)
NEUTROPHILS NFR BLD AUTO: 75.3 % — SIGNIFICANT CHANGE UP (ref 43–77)
PHOSPHATE SERPL-MCNC: 3.2 MG/DL — SIGNIFICANT CHANGE UP (ref 2.4–4.7)
PLATELET # BLD AUTO: 438 K/UL — HIGH (ref 150–400)
POTASSIUM SERPL-MCNC: 3.2 MMOL/L — LOW (ref 3.5–5.3)
POTASSIUM SERPL-SCNC: 3.2 MMOL/L — LOW (ref 3.5–5.3)
PROT SERPL-MCNC: 5.8 G/DL — LOW (ref 6.6–8.7)
RBC # BLD: 3.3 M/UL — LOW (ref 4.2–5.8)
RBC # FLD: 17.7 % — HIGH (ref 10.3–14.5)
SODIUM SERPL-SCNC: 142 MMOL/L — SIGNIFICANT CHANGE UP (ref 135–145)
WBC # BLD: 7.45 K/UL — SIGNIFICANT CHANGE UP (ref 3.8–10.5)
WBC # FLD AUTO: 7.45 K/UL — SIGNIFICANT CHANGE UP (ref 3.8–10.5)

## 2019-08-11 PROCEDURE — 99233 SBSQ HOSP IP/OBS HIGH 50: CPT | Mod: GC

## 2019-08-11 RX ORDER — HYDROMORPHONE HYDROCHLORIDE 2 MG/ML
0.5 INJECTION INTRAMUSCULAR; INTRAVENOUS; SUBCUTANEOUS
Refills: 0 | Status: DISCONTINUED | OUTPATIENT
Start: 2019-08-11 | End: 2019-08-12

## 2019-08-11 RX ORDER — POTASSIUM CHLORIDE 20 MEQ
40 PACKET (EA) ORAL ONCE
Refills: 0 | Status: COMPLETED | OUTPATIENT
Start: 2019-08-11 | End: 2019-08-11

## 2019-08-11 RX ORDER — INSULIN LISPRO 100/ML
3 VIAL (ML) SUBCUTANEOUS ONCE
Refills: 0 | Status: COMPLETED | OUTPATIENT
Start: 2019-08-11 | End: 2019-08-11

## 2019-08-11 RX ORDER — HYDROMORPHONE HYDROCHLORIDE 2 MG/ML
0.5 INJECTION INTRAMUSCULAR; INTRAVENOUS; SUBCUTANEOUS ONCE
Refills: 0 | Status: DISCONTINUED | OUTPATIENT
Start: 2019-08-11 | End: 2019-08-11

## 2019-08-11 RX ORDER — MAGNESIUM SULFATE 500 MG/ML
1 VIAL (ML) INJECTION ONCE
Refills: 0 | Status: COMPLETED | OUTPATIENT
Start: 2019-08-11 | End: 2019-08-11

## 2019-08-11 RX ORDER — INSULIN GLARGINE 100 [IU]/ML
10 INJECTION, SOLUTION SUBCUTANEOUS AT BEDTIME
Refills: 0 | Status: DISCONTINUED | OUTPATIENT
Start: 2019-08-11 | End: 2019-08-13

## 2019-08-11 RX ORDER — POTASSIUM CHLORIDE 20 MEQ
40 PACKET (EA) ORAL ONCE
Refills: 0 | Status: DISCONTINUED | OUTPATIENT
Start: 2019-08-11 | End: 2019-08-11

## 2019-08-11 RX ADMIN — Medication 500 MILLIGRAM(S): at 12:03

## 2019-08-11 RX ADMIN — HYDROMORPHONE HYDROCHLORIDE 4 MILLIGRAM(S): 2 INJECTION INTRAMUSCULAR; INTRAVENOUS; SUBCUTANEOUS at 09:26

## 2019-08-11 RX ADMIN — HYDROMORPHONE HYDROCHLORIDE 4 MILLIGRAM(S): 2 INJECTION INTRAMUSCULAR; INTRAVENOUS; SUBCUTANEOUS at 17:08

## 2019-08-11 RX ADMIN — PIPERACILLIN AND TAZOBACTAM 25 GRAM(S): 4; .5 INJECTION, POWDER, LYOPHILIZED, FOR SOLUTION INTRAVENOUS at 13:57

## 2019-08-11 RX ADMIN — Medication 250 MILLIGRAM(S): at 17:03

## 2019-08-11 RX ADMIN — DULOXETINE HYDROCHLORIDE 60 MILLIGRAM(S): 30 CAPSULE, DELAYED RELEASE ORAL at 05:01

## 2019-08-11 RX ADMIN — HYDROMORPHONE HYDROCHLORIDE 4 MILLIGRAM(S): 2 INJECTION INTRAMUSCULAR; INTRAVENOUS; SUBCUTANEOUS at 16:59

## 2019-08-11 RX ADMIN — INSULIN GLARGINE 10 UNIT(S): 100 INJECTION, SOLUTION SUBCUTANEOUS at 22:50

## 2019-08-11 RX ADMIN — Medication 975 MILLIGRAM(S): at 13:05

## 2019-08-11 RX ADMIN — PIPERACILLIN AND TAZOBACTAM 25 GRAM(S): 4; .5 INJECTION, POWDER, LYOPHILIZED, FOR SOLUTION INTRAVENOUS at 05:02

## 2019-08-11 RX ADMIN — HYDROMORPHONE HYDROCHLORIDE 4 MILLIGRAM(S): 2 INJECTION INTRAMUSCULAR; INTRAVENOUS; SUBCUTANEOUS at 03:18

## 2019-08-11 RX ADMIN — METHOCARBAMOL 750 MILLIGRAM(S): 500 TABLET, FILM COATED ORAL at 22:01

## 2019-08-11 RX ADMIN — HYDROMORPHONE HYDROCHLORIDE 0.5 MILLIGRAM(S): 2 INJECTION INTRAMUSCULAR; INTRAVENOUS; SUBCUTANEOUS at 05:45

## 2019-08-11 RX ADMIN — Medication 3: at 17:07

## 2019-08-11 RX ADMIN — HYDROMORPHONE HYDROCHLORIDE 0.5 MILLIGRAM(S): 2 INJECTION INTRAMUSCULAR; INTRAVENOUS; SUBCUTANEOUS at 22:30

## 2019-08-11 RX ADMIN — Medication 325 MILLIGRAM(S): at 17:02

## 2019-08-11 RX ADMIN — Medication 100 GRAM(S): at 13:54

## 2019-08-11 RX ADMIN — METHOCARBAMOL 750 MILLIGRAM(S): 500 TABLET, FILM COATED ORAL at 13:04

## 2019-08-11 RX ADMIN — HYDROMORPHONE HYDROCHLORIDE 4 MILLIGRAM(S): 2 INJECTION INTRAMUSCULAR; INTRAVENOUS; SUBCUTANEOUS at 12:31

## 2019-08-11 RX ADMIN — Medication 20 MILLIGRAM(S): at 05:01

## 2019-08-11 RX ADMIN — GABAPENTIN 300 MILLIGRAM(S): 400 CAPSULE ORAL at 05:00

## 2019-08-11 RX ADMIN — Medication 3 UNIT(S): at 23:24

## 2019-08-11 RX ADMIN — HYDROMORPHONE HYDROCHLORIDE 4 MILLIGRAM(S): 2 INJECTION INTRAMUSCULAR; INTRAVENOUS; SUBCUTANEOUS at 12:01

## 2019-08-11 RX ADMIN — HYDROMORPHONE HYDROCHLORIDE 2 MILLIGRAM(S): 2 INJECTION INTRAMUSCULAR; INTRAVENOUS; SUBCUTANEOUS at 20:06

## 2019-08-11 RX ADMIN — Medication 975 MILLIGRAM(S): at 05:00

## 2019-08-11 RX ADMIN — Medication 975 MILLIGRAM(S): at 21:03

## 2019-08-11 RX ADMIN — Medication 325 MILLIGRAM(S): at 12:03

## 2019-08-11 RX ADMIN — Medication 1 TABLET(S): at 12:07

## 2019-08-11 RX ADMIN — Medication 975 MILLIGRAM(S): at 14:20

## 2019-08-11 RX ADMIN — GABAPENTIN 300 MILLIGRAM(S): 400 CAPSULE ORAL at 13:03

## 2019-08-11 RX ADMIN — Medication 250 MILLIGRAM(S): at 05:01

## 2019-08-11 RX ADMIN — HYDROMORPHONE HYDROCHLORIDE 0.5 MILLIGRAM(S): 2 INJECTION INTRAMUSCULAR; INTRAVENOUS; SUBCUTANEOUS at 12:31

## 2019-08-11 RX ADMIN — HYDROMORPHONE HYDROCHLORIDE 2 MILLIGRAM(S): 2 INJECTION INTRAMUSCULAR; INTRAVENOUS; SUBCUTANEOUS at 19:36

## 2019-08-11 RX ADMIN — Medication 325 MILLIGRAM(S): at 05:01

## 2019-08-11 RX ADMIN — Medication 975 MILLIGRAM(S): at 21:33

## 2019-08-11 RX ADMIN — METHOCARBAMOL 750 MILLIGRAM(S): 500 TABLET, FILM COATED ORAL at 05:01

## 2019-08-11 RX ADMIN — HYDROMORPHONE HYDROCHLORIDE 0.5 MILLIGRAM(S): 2 INJECTION INTRAMUSCULAR; INTRAVENOUS; SUBCUTANEOUS at 12:58

## 2019-08-11 RX ADMIN — Medication 2: at 12:18

## 2019-08-11 RX ADMIN — Medication 1 APPLICATION(S): at 12:19

## 2019-08-11 RX ADMIN — ENOXAPARIN SODIUM 40 MILLIGRAM(S): 100 INJECTION SUBCUTANEOUS at 12:04

## 2019-08-11 RX ADMIN — DULOXETINE HYDROCHLORIDE 60 MILLIGRAM(S): 30 CAPSULE, DELAYED RELEASE ORAL at 17:01

## 2019-08-11 RX ADMIN — Medication 1 MILLIGRAM(S): at 12:03

## 2019-08-11 RX ADMIN — PANTOPRAZOLE SODIUM 40 MILLIGRAM(S): 20 TABLET, DELAYED RELEASE ORAL at 05:01

## 2019-08-11 RX ADMIN — HYDROMORPHONE HYDROCHLORIDE 0.5 MILLIGRAM(S): 2 INJECTION INTRAMUSCULAR; INTRAVENOUS; SUBCUTANEOUS at 05:30

## 2019-08-11 RX ADMIN — ATORVASTATIN CALCIUM 40 MILLIGRAM(S): 80 TABLET, FILM COATED ORAL at 21:04

## 2019-08-11 RX ADMIN — Medication 50 MILLIGRAM(S): at 05:01

## 2019-08-11 RX ADMIN — HYDROMORPHONE HYDROCHLORIDE 0.5 MILLIGRAM(S): 2 INJECTION INTRAMUSCULAR; INTRAVENOUS; SUBCUTANEOUS at 00:01

## 2019-08-11 RX ADMIN — HYDROMORPHONE HYDROCHLORIDE 4 MILLIGRAM(S): 2 INJECTION INTRAMUSCULAR; INTRAVENOUS; SUBCUTANEOUS at 04:18

## 2019-08-11 RX ADMIN — ZOLPIDEM TARTRATE 5 MILLIGRAM(S): 10 TABLET ORAL at 22:03

## 2019-08-11 RX ADMIN — Medication 1: at 08:08

## 2019-08-11 RX ADMIN — Medication 975 MILLIGRAM(S): at 06:10

## 2019-08-11 RX ADMIN — Medication 50 MILLIGRAM(S): at 17:02

## 2019-08-11 RX ADMIN — HYDROMORPHONE HYDROCHLORIDE 0.5 MILLIGRAM(S): 2 INJECTION INTRAMUSCULAR; INTRAVENOUS; SUBCUTANEOUS at 22:00

## 2019-08-11 RX ADMIN — Medication 100 MILLIGRAM(S): at 12:04

## 2019-08-11 RX ADMIN — GABAPENTIN 300 MILLIGRAM(S): 400 CAPSULE ORAL at 21:04

## 2019-08-11 RX ADMIN — HYDROMORPHONE HYDROCHLORIDE 4 MILLIGRAM(S): 2 INJECTION INTRAMUSCULAR; INTRAVENOUS; SUBCUTANEOUS at 07:39

## 2019-08-11 RX ADMIN — PIPERACILLIN AND TAZOBACTAM 25 GRAM(S): 4; .5 INJECTION, POWDER, LYOPHILIZED, FOR SOLUTION INTRAVENOUS at 21:04

## 2019-08-11 RX ADMIN — Medication 40 MILLIEQUIVALENT(S): at 13:03

## 2019-08-11 NOTE — PROGRESS NOTE ADULT - SUBJECTIVE AND OBJECTIVE BOX
Patient is a 54y old  Male who presents with a chief complaint of sepsis, foot wound (01 Aug 2019 13:18) s/p BKA controlling pain     Overnight AM events:  Patient seen and examined at beside. No acute events over night. POD #10 s/p Right BKA and POD #4 s/p right AKA revision with proximal femoral nerve resection. Pt was complaining of pain, he was receiving his morning meds. States pain is 10/10 without pain meds but improves to 7/10 after medication. Pt is tolerating PO diet w/o n/v/diarrhea. Pt is voiding and last BM was yesterday.    ROS: Denies CP, HA, SOB, n/v/, fever/chills, abdominal/back pain, vision changes, numbness/tingling, blood in urine or stool. All other ROS negative     CAPILLARY BLOOD GLUCOSE      POCT Blood Glucose.: 238 mg/dL (10 Aug 2019 23:09)  POCT Blood Glucose.: 261 mg/dL (10 Aug 2019 16:31)  POCT Blood Glucose.: 266 mg/dL (10 Aug 2019 11:31)  POCT Blood Glucose.: 190 mg/dL (10 Aug 2019 08:05)    Vital Signs Last 24 Hrs    T(F): 98.1 (11 Aug 2019 01:13), Max: 98.1 (11 Aug 2019 01:13)  HR: 102 (11 Aug 2019 05:00) (100 - 110)  BP: 157/95 (11 Aug 2019 05:00) (148/89 - 157/95)  RR: 18 (11 Aug 2019 05:00) (18 - 18)  SpO2: 98% (11 Aug 2019 05:00) (93% - 98%)    GENERAL: Obese, WD WN, poor hygiene   HEENT: PERRLA, pupil constricted to light b/l, poor dentition   RESP: CTA b/l,, no crackles, no wheezing or rhonchi   CVS: RRR, Normal S1 & S2, No m/r/g  GI: soft +BS normoactive, NT, ND, b/l groin intertriginous dermatitis - no signs of infection   Extremities: Rt foot s/p R AKA, L foot - superficial ulcer 4x5cm, no active drainage, s/p partial amputation of foot clean dry no evidence of infection. Healing well.   Neuro: AAOX3, non focal   Skin: as above     LAB                        8.2    7.52  )-----------( 436      ( 10 Aug 2019 08:20 )             27.8   08-10    140  |  104  |  12.0  ----------------------------<  183<H>  4.0   |  26.0  |  0.50    Ca    8.2<L>      10 Aug 2019 08:20      IMAGING  < from: TTE Echo Complete w/Doppler (08.03.19 @ 13:18) >  Summary:   1. Technically limited study.   2. Normal left ventricular internal cavity size.   3. Normal global left ventricular systolic function.   4. Left ventricular ejection fraction, by visual estimation, is 65 to   70%.   5. The left ventricular diastolic function could not be assessed in this   study.   6. Normal right ventricular size and function.   7. Moderately enlarged left atrium.   8. Normal trileaflet aortic valve with normal opening.   9. Estimated pulmonary artery systolic pressure is 45.4 mmHg assuming a   right atrial pressure of 5 mmHg, which is consistent with mild pulmonary   hypertension.  10. There is no evidence of pericardial effusion.    < end of copied text >      < from: Xray Ankle Complete 3 Views, Right (07.31.19 @ 22:25) >  IMPRESSION:   Soft tissue swelling, ulcerations consistent with diffuse   osteomyelitis involving the phalanges and metatarsal bones tarsal bones   possibly ankle joint/show bilateral medial malleolus.    < end of copied text >    < from: Xray Foot AP + Lateral + Oblique, Right (07.31.19 @ 22:25) >  MPRESSION:   Soft tissue swelling, ulcerations consistent with diffuse   osteomyelitis involving the phalanges and metatarsal bones tarsal bones   possibly ankle joint/show bilateral medial malleolus.    < end of copied text >    < from: Xray Chest 1 View-PORTABLE IMMEDIATE (07.31.19 @ 20:04) >  Impression:  No evidence of acute cardiopulmonary disease. Allowing for differences in   technique, no significant change since 5/11/2016..    < end of copied text >      MEDICATIONS  (STANDING):  acetaminophen   Tablet .. 975 milliGRAM(s) Oral every 8 hours  ascorbic acid 500 milliGRAM(s) Oral daily  atorvastatin 40 milliGRAM(s) Oral at bedtime  dextrose 5%. 1000 milliLiter(s) (50 mL/Hr) IV Continuous <Continuous>  dextrose 50% Injectable 12.5 Gram(s) IV Push once  dextrose 50% Injectable 25 Gram(s) IV Push once  dextrose 50% Injectable 25 Gram(s) IV Push once  DULoxetine 60 milliGRAM(s) Oral two times a day  enalapril 20 milliGRAM(s) Oral daily  enoxaparin Injectable 40 milliGRAM(s) SubCutaneous daily  ferrous    sulfate 325 milliGRAM(s) Oral four times a day  folic acid 1 milliGRAM(s) Oral daily  gabapentin 300 milliGRAM(s) Oral three times a day  insulin lispro (HumaLOG) corrective regimen sliding scale   SubCutaneous three times a day before meals  methocarbamol 750 milliGRAM(s) Oral every 8 hours  metoprolol tartrate 50 milliGRAM(s) Oral two times a day  multivitamin 1 Tablet(s) Oral daily  pantoprazole    Tablet 40 milliGRAM(s) Oral before breakfast  piperacillin/tazobactam IVPB.. 3.375 Gram(s) IV Intermittent every 8 hours  saccharomyces boulardii 250 milliGRAM(s) Oral two times a day  silver sulfADIAZINE 1% Cream 1 Application(s) Topical daily  thiamine 100 milliGRAM(s) Oral daily    MEDICATIONS  (PRN):  aluminum hydroxide/magnesium hydroxide/simethicone Suspension 30 milliLiter(s) Oral every 6 hours PRN Dyspepsia  dextrose 40% Gel 15 Gram(s) Oral once PRN Blood Glucose LESS THAN 70 milliGRAM(s)/deciliter  glucagon  Injectable 1 milliGRAM(s) IntraMuscular once PRN Glucose LESS THAN 70 milligrams/deciliter  HYDROmorphone   Tablet 2 milliGRAM(s) Oral every 4 hours PRN Moderate Pain (4 - 6)  HYDROmorphone   Tablet 4 milliGRAM(s) Oral every 4 hours PRN Severe Pain (7 - 10)  metoprolol tartrate IVPB 5 milliGRAM(s) IV Intermittent once PRN HR greater than 120  zolpidem 5 milliGRAM(s) Oral at bedtime PRN Insomnia Patient is a 54y old  Male who presents with a chief complaint of sepsis, foot wound (01 Aug 2019 13:18) s/p BKA controlling pain     Overnight AM events:  Patient seen and examined at beside. No acute events over night. POD #10 s/p Right BKA and POD #4 s/p right AKA revision with proximal femoral nerve resection. Pt was complaining of pain, he was receiving his morning meds.  States pain is normally 10/10 without pain meds but improves to 7/10 after medication. Was still in pain after AM dose of Dilaudid and required a 0.5mg IV dose with relief. Pt is tolerating PO diet w/o n/v/diarrhea. Pt is voiding and last BM was yesterday    ROS: Denies CP, HA, SOB, n/v/, fever/chills, abdominal/back pain, vision changes, numbness/tingling, blood in urine or stool. All other ROS negative     CAPILLARY BLOOD GLUCOSE    CAPILLARY BLOOD GLUCOSE    POCT Blood Glucose.: 292 mg/dL (11 Aug 2019 17:06)  POCT Blood Glucose.: 228 mg/dL (11 Aug 2019 12:16)  POCT Blood Glucose.: 173 mg/dL (11 Aug 2019 08:01)  POCT Blood Glucose.: 238 mg/dL (10 Aug 2019 23:09)    Vital Signs Last 24 Hrs    T(F): 97.8 (11 Aug 2019 16:25), Max: 98.1 (11 Aug 2019 01:13)  HR: 116 (11 Aug 2019 16:25) (100 - 116)  BP: 144/86 (11 Aug 2019 16:25) (144/86 - 157/95)  RR: 18 (11 Aug 2019 16:25) (18 - 18)  SpO2: 98% (11 Aug 2019 16:25) (93% - 98%)    GENERAL: Obese, WD WN, poor hygiene   HEENT: PERRLA, pupil constricted to light b/l, poor dentition   RESP: CTA b/l,, no crackles, no wheezing or rhonchi   CVS: RRR, Normal S1 & S2, No m/r/g  GI: soft +BS normoactive, NT, ND, b/l groin intertriginous dermatitis - no signs of infection   Extremities: Rt foot s/p R AKA, L foot - superficial ulcer 4x5cm, no active drainage, s/p partial amputation of foot clean dry no evidence of infection. Healing well.   Neuro: AAOX3, non focal   Skin: as above                           8.7    7.45  )-----------( 438      ( 11 Aug 2019 10:10 )             29.4     08-11    142  |  105  |  10.0  ----------------------------<  218<H>  3.2<L>   |  26.0  |  0.52    Ca    8.1<L>      11 Aug 2019 10:10  Phos  3.2     08-11  Mg     1.1     08-11    TPro  5.8<L>  /  Alb  2.4<L>  /  TBili  0.2<L>  /  DBili  x   /  AST  14  /  ALT  10  /  AlkPhos  285<H>  08-11    LIVER FUNCTIONS - ( 11 Aug 2019 10:10 )  Alb: 2.4 g/dL / Pro: 5.8 g/dL / ALK PHOS: 285 U/L / ALT: 10 U/L / AST: 14 U/L / GGT: x               IMAGING  < from: TTE Echo Complete w/Doppler (08.03.19 @ 13:18) >  Summary:   1. Technically limited study.   2. Normal left ventricular internal cavity size.   3. Normal global left ventricular systolic function.   4. Left ventricular ejection fraction, by visual estimation, is 65 to   70%.   5. The left ventricular diastolic function could not be assessed in this   study.   6. Normal right ventricular size and function.   7. Moderately enlarged left atrium.   8. Normal trileaflet aortic valve with normal opening.   9. Estimated pulmonary artery systolic pressure is 45.4 mmHg assuming a   right atrial pressure of 5 mmHg, which is consistent with mild pulmonary   hypertension.  10. There is no evidence of pericardial effusion.    < end of copied text >      < from: Xray Ankle Complete 3 Views, Right (07.31.19 @ 22:25) >  IMPRESSION:   Soft tissue swelling, ulcerations consistent with diffuse   osteomyelitis involving the phalanges and metatarsal bones tarsal bones   possibly ankle joint/show bilateral medial malleolus.    < end of copied text >    < from: Xray Foot AP + Lateral + Oblique, Right (07.31.19 @ 22:25) >  MPRESSION:   Soft tissue swelling, ulcerations consistent with diffuse   osteomyelitis involving the phalanges and metatarsal bones tarsal bones   possibly ankle joint/show bilateral medial malleolus.    < end of copied text >    < from: Xray Chest 1 View-PORTABLE IMMEDIATE (07.31.19 @ 20:04) >  Impression:  No evidence of acute cardiopulmonary disease. Allowing for differences in   technique, no significant change since 5/11/2016..    < end of copied text >      MEDICATIONS  (STANDING):  acetaminophen   Tablet .. 975 milliGRAM(s) Oral every 8 hours  ascorbic acid 500 milliGRAM(s) Oral daily  atorvastatin 40 milliGRAM(s) Oral at bedtime  dextrose 5%. 1000 milliLiter(s) (50 mL/Hr) IV Continuous <Continuous>  dextrose 50% Injectable 12.5 Gram(s) IV Push once  dextrose 50% Injectable 25 Gram(s) IV Push once  dextrose 50% Injectable 25 Gram(s) IV Push once  DULoxetine 60 milliGRAM(s) Oral two times a day  enalapril 20 milliGRAM(s) Oral daily  enoxaparin Injectable 40 milliGRAM(s) SubCutaneous daily  ferrous    sulfate 325 milliGRAM(s) Oral four times a day  folic acid 1 milliGRAM(s) Oral daily  gabapentin 300 milliGRAM(s) Oral three times a day  insulin glargine Injectable (LANTUS) 10 Unit(s) SubCutaneous at bedtime  insulin lispro (HumaLOG) corrective regimen sliding scale   SubCutaneous three times a day before meals  methocarbamol 750 milliGRAM(s) Oral every 8 hours  metoprolol tartrate 50 milliGRAM(s) Oral two times a day  multivitamin 1 Tablet(s) Oral daily  pantoprazole    Tablet 40 milliGRAM(s) Oral before breakfast  piperacillin/tazobactam IVPB.. 3.375 Gram(s) IV Intermittent every 8 hours  saccharomyces boulardii 250 milliGRAM(s) Oral two times a day  silver sulfADIAZINE 1% Cream 1 Application(s) Topical daily  thiamine 100 milliGRAM(s) Oral daily    MEDICATIONS  (PRN):  aluminum hydroxide/magnesium hydroxide/simethicone Suspension 30 milliLiter(s) Oral every 6 hours PRN Dyspepsia  dextrose 40% Gel 15 Gram(s) Oral once PRN Blood Glucose LESS THAN 70 milliGRAM(s)/deciliter  glucagon  Injectable 1 milliGRAM(s) IntraMuscular once PRN Glucose LESS THAN 70 milligrams/deciliter  HYDROmorphone   Tablet 2 milliGRAM(s) Oral every 4 hours PRN Moderate Pain (4 - 6)  HYDROmorphone   Tablet 4 milliGRAM(s) Oral every 4 hours PRN Severe Pain (7 - 10)  HYDROmorphone  Injectable 0.5 milliGRAM(s) IV Push two times a day PRN Severe Pain (7 - 10)  metoprolol tartrate IVPB 5 milliGRAM(s) IV Intermittent once PRN HR greater than 120  zolpidem 5 milliGRAM(s) Oral at bedtime PRN Insomnia

## 2019-08-11 NOTE — PROGRESS NOTE ADULT - ASSESSMENT
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 Pt admitted with severe sepsis secondary to wet gangrene of RLE, complicated by maggot infestation of wound. Vascular sx consulted and pt s/p wound cleansing and went to the OR for emergent source control and s/p  Guillotine R BKA. Sepsis resolved post sx. Empirically on vanco/ zosyn, Bcx negative. Hospital course complicated by acute on chronic anemia in the setting of AOCD, s/p 2 u prbc on admission and 1 u prbc intraop with appropriate response to hgb, remains stable thereafter. FOBT pending but no evidence of active bleeding. Also noted with BRANT/ hyponatremia likely due to dehydration, s/p ivf fluid resuscitation with improving renal function and sodium. Slow clinical improvement. ID, Podiatry, Vascular, PM and Psych consults noted. PT consult noted, recommends acute rehab. OT and PM&R consults noted. PM consulted noted, pain regimen adjusted s/p revision sx.     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  POD #4 s/p right AKA revision with proximal femoral nerve resection.  - currently with down trending leukocytosis to wnl   - elevated esr/ crp   - afebrile  and normotensive - B cx negative X 2  - completed Zosyn 3.375 g IV q8h D#11 today, Off vanco   - pain control as per pain mx, no role for suboxone until off of acute narcotics   - c/w Dilaudid on a sliding scale, robaxin and gabapentin   -c/w Dilaudid 0.5mg BID PRN or severe/breakthrough pain   - c/w colace for prn constipation   - c/w local wound care as per vascular sx   - ID f/u noted and appreciated. Pt will c/w abx until 4 days s/p BKA revision - stop date 8/11 (today)   - vascular sx f/u  noted and appreciated.  POD #4 s/p right AKA revision with proximal femoral nerve resection. Amputee support team on board. Representatives from prosthetic devices were at bedside previously.  - Podiatry, pain management and psych also on board   -PT consult noted. Recommends acute rehab and also OT and PM&R consults.     Sinus tachycardia - at this time could also be due to pain   - Stable now. If persists will consult Cardiology. Repeat EKG sinus tachycardia. Lopressor 5mg IVP once for HR >120, to be given if tachycardia persists.  - Pt denies CP, SOB.  - TTE: Normal LVSF, EF 65-70; No right heart strain; Mild Pul HTN; mod enlarged LA  -c/w low dose bb     Depression  -Confirmed by PMD Dr. Pierce  -C/w Cymbalta 60mg BID; seen by psych, will re consult due to concerns for depression and also when ready to transition pt to suboxone.     BRANT - resolved  - likely secondary to dehydration; severe sepsis now resolved   -Improving renal fxn to wnl  -sodium now wnl  Tolerating PO intake    B/l groin intertriginous dermatitis  -C/w Nystatin powder bid  -Continue to monitor for signs of infection     Hypocalcemia  -corrected amount is 9.4, no further intervention     Acute on Chronic Anemia   - noted AOCD, now stable   -Initial Hb 8.3, downtrended to 6.4   - likely bc pt was initially dehydrated   -s/p 2U PRBCs on admision and 1 u prbc intra op   - FOBT pending, no evidence of bleeding   - will monitor hgb and transfuse for hgb <7   -C/w iron supplement, - c/w vitamin c     IDDM-2 poorly controlled  -HbA1c 7  - c/w accuchecks ACHS TID  - pt with am sugars 170-190, to complete last abx in dextrose today.   - added 7 units lantus, c/w ISS  - will adjust accordingly    HTN/HLD  -C/w Metoprolol, Enalapril. BP slightly elevated likely due to pain   -c/w lipitor     H/o polysubstance use disorder - currently taking Suboxone  -Utox negative  -Last dose of Suboxone was day prior to hospitalization   -currently pt requiring narcotics for pain control   - labile emotions - psych reconsult for signs of depression  -Psych consult noted and appreciated, will reconsult when pt is off narcotics or defer to outpt management     Preventative Measures  DVT ppx: started Lovenox 40 - benefits outweigh risk. s/p BKA, immobile, sedentary, platelets stable, renal functions wnl.    Advanced Directive: Full code  Next of kin: Brother Jose    Dispo:  Likely d/c to [facility]. Pt eval noted. OT and PM&R consults noted, pt needs facility that accepts Suboxone use.

## 2019-08-11 NOTE — PROGRESS NOTE ADULT - SUBJECTIVE AND OBJECTIVE BOX
HPI/OVERNIGHT EVENTS:  No acute events overnight. AKA dressing change this AM. Wound appears clean, dry, and nonerythematous.  Patient remains tachy with otherwise normal vitals. Denies fever, chills, nausea, vomiting, chest pain, SOB, dizziness, abd pain or any other concerning symptoms    MEDICATIONS  (STANDING):  acetaminophen   Tablet .. 975 milliGRAM(s) Oral every 8 hours  ascorbic acid 500 milliGRAM(s) Oral daily  atorvastatin 40 milliGRAM(s) Oral at bedtime  dextrose 5%. 1000 milliLiter(s) (50 mL/Hr) IV Continuous <Continuous>  dextrose 50% Injectable 12.5 Gram(s) IV Push once  dextrose 50% Injectable 25 Gram(s) IV Push once  dextrose 50% Injectable 25 Gram(s) IV Push once  DULoxetine 60 milliGRAM(s) Oral two times a day  enalapril 20 milliGRAM(s) Oral daily  enoxaparin Injectable 40 milliGRAM(s) SubCutaneous daily  ferrous    sulfate 325 milliGRAM(s) Oral four times a day  folic acid 1 milliGRAM(s) Oral daily  gabapentin 300 milliGRAM(s) Oral three times a day  insulin lispro (HumaLOG) corrective regimen sliding scale   SubCutaneous three times a day before meals  methocarbamol 750 milliGRAM(s) Oral every 8 hours  metoprolol tartrate 50 milliGRAM(s) Oral two times a day  multivitamin 1 Tablet(s) Oral daily  pantoprazole    Tablet 40 milliGRAM(s) Oral before breakfast  piperacillin/tazobactam IVPB.. 3.375 Gram(s) IV Intermittent every 8 hours  saccharomyces boulardii 250 milliGRAM(s) Oral two times a day  silver sulfADIAZINE 1% Cream 1 Application(s) Topical daily  thiamine 100 milliGRAM(s) Oral daily    MEDICATIONS  (PRN):  aluminum hydroxide/magnesium hydroxide/simethicone Suspension 30 milliLiter(s) Oral every 6 hours PRN Dyspepsia  dextrose 40% Gel 15 Gram(s) Oral once PRN Blood Glucose LESS THAN 70 milliGRAM(s)/deciliter  glucagon  Injectable 1 milliGRAM(s) IntraMuscular once PRN Glucose LESS THAN 70 milligrams/deciliter  HYDROmorphone   Tablet 2 milliGRAM(s) Oral every 4 hours PRN Moderate Pain (4 - 6)  HYDROmorphone   Tablet 4 milliGRAM(s) Oral every 4 hours PRN Severe Pain (7 - 10)  HYDROmorphone  Injectable 0.5 milliGRAM(s) SubCutaneous every 6 hours PRN Severe Pain unrelieved by oral opioids after 1 hour  metoprolol tartrate IVPB 5 milliGRAM(s) IV Intermittent once PRN HR greater than 120  zolpidem 5 milliGRAM(s) Oral at bedtime PRN Insomnia      Vital Signs Last 24 Hrs  T(C): 36.7 (11 Aug 2019 01:13), Max: 36.7 (11 Aug 2019 01:13)  T(F): 98.1 (11 Aug 2019 01:13), Max: 98.1 (11 Aug 2019 01:13)  HR: 102 (11 Aug 2019 01:13) (100 - 112)  BP: 155/89 (11 Aug 2019 01:13) (148/89 - 160/99)  BP(mean): --  RR: 18 (10 Aug 2019 17:50) (18 - 18)  SpO2: 95% (11 Aug 2019 01:13) (93% - 97%)    Constitutional: patient laying in bed, in no acute distress  HEENT: EOMI, no active drainage or redness  Neck: No JVD, full ROM without pain  Respiratory: respirations are unlabored, no accessory muscle use, no conversational dyspnea  Cardiovascular: tachy, reg rhythm  Ext: Right AKA incision clean, dry, without drainage, and non-erythematous. Staples in place. Dressing and ACE bandage. Right thigh is soft and compressible. Left TMA with dressing in place.      I&O's Detail    09 Aug 2019 07:01  -  10 Aug 2019 07:00  --------------------------------------------------------  IN:    Oral Fluid: 500 mL    Solution: 200 mL  Total IN: 700 mL    OUT:    Voided: 2800 mL  Total OUT: 2800 mL    Total NET: -2100 mL      10 Aug 2019 07:01  -  11 Aug 2019 02:27  --------------------------------------------------------  IN:  Total IN: 0 mL    OUT:    Voided: 900 mL  Total OUT: 900 mL    Total NET: -900 mL          LABS:                        8.2    7.52  )-----------( 436      ( 10 Aug 2019 08:20 )             27.8     08-10    140  |  104  |  12.0  ----------------------------<  183<H>  4.0   |  26.0  |  0.50    Ca    8.2<L>      10 Aug 2019 08:20 HPI/OVERNIGHT EVENTS:  No acute events overnight. AKA dressing change this AM. patient in pain after dressing. Wound appears clean, dry, and nonerythematous.  Patient remains tachy with otherwise normal vitals. Denies fever, chills, nausea, vomiting, chest pain, SOB, dizziness, abd pain or any other concerning symptoms    MEDICATIONS  (STANDING):  acetaminophen   Tablet .. 975 milliGRAM(s) Oral every 8 hours  ascorbic acid 500 milliGRAM(s) Oral daily  atorvastatin 40 milliGRAM(s) Oral at bedtime  dextrose 5%. 1000 milliLiter(s) (50 mL/Hr) IV Continuous <Continuous>  dextrose 50% Injectable 12.5 Gram(s) IV Push once  dextrose 50% Injectable 25 Gram(s) IV Push once  dextrose 50% Injectable 25 Gram(s) IV Push once  DULoxetine 60 milliGRAM(s) Oral two times a day  enalapril 20 milliGRAM(s) Oral daily  enoxaparin Injectable 40 milliGRAM(s) SubCutaneous daily  ferrous    sulfate 325 milliGRAM(s) Oral four times a day  folic acid 1 milliGRAM(s) Oral daily  gabapentin 300 milliGRAM(s) Oral three times a day  insulin lispro (HumaLOG) corrective regimen sliding scale   SubCutaneous three times a day before meals  methocarbamol 750 milliGRAM(s) Oral every 8 hours  metoprolol tartrate 50 milliGRAM(s) Oral two times a day  multivitamin 1 Tablet(s) Oral daily  pantoprazole    Tablet 40 milliGRAM(s) Oral before breakfast  piperacillin/tazobactam IVPB.. 3.375 Gram(s) IV Intermittent every 8 hours  saccharomyces boulardii 250 milliGRAM(s) Oral two times a day  silver sulfADIAZINE 1% Cream 1 Application(s) Topical daily  thiamine 100 milliGRAM(s) Oral daily    MEDICATIONS  (PRN):  aluminum hydroxide/magnesium hydroxide/simethicone Suspension 30 milliLiter(s) Oral every 6 hours PRN Dyspepsia  dextrose 40% Gel 15 Gram(s) Oral once PRN Blood Glucose LESS THAN 70 milliGRAM(s)/deciliter  glucagon  Injectable 1 milliGRAM(s) IntraMuscular once PRN Glucose LESS THAN 70 milligrams/deciliter  HYDROmorphone   Tablet 2 milliGRAM(s) Oral every 4 hours PRN Moderate Pain (4 - 6)  HYDROmorphone   Tablet 4 milliGRAM(s) Oral every 4 hours PRN Severe Pain (7 - 10)  HYDROmorphone  Injectable 0.5 milliGRAM(s) SubCutaneous every 6 hours PRN Severe Pain unrelieved by oral opioids after 1 hour  metoprolol tartrate IVPB 5 milliGRAM(s) IV Intermittent once PRN HR greater than 120  zolpidem 5 milliGRAM(s) Oral at bedtime PRN Insomnia      Vital Signs Last 24 Hrs  T(C): 36.7 (11 Aug 2019 01:13), Max: 36.7 (11 Aug 2019 01:13)  T(F): 98.1 (11 Aug 2019 01:13), Max: 98.1 (11 Aug 2019 01:13)  HR: 102 (11 Aug 2019 01:13) (100 - 112)  BP: 155/89 (11 Aug 2019 01:13) (148/89 - 160/99)  BP(mean): --  RR: 18 (10 Aug 2019 17:50) (18 - 18)  SpO2: 95% (11 Aug 2019 01:13) (93% - 97%)    Constitutional: patient laying in bed, in no acute distress  HEENT: EOMI, no active drainage or redness  Neck: No JVD, full ROM without pain  Respiratory: respirations are unlabored, no accessory muscle use, no conversational dyspnea  Cardiovascular: tachy, reg rhythm  Ext: Right AKA incision clean, dry, without drainage, and non-erythematous. Staples in place. Dressing and ACE bandage. Right thigh is soft and compressible. Left TMA with dressing in place.      I&O's Detail    09 Aug 2019 07:01  -  10 Aug 2019 07:00  --------------------------------------------------------  IN:    Oral Fluid: 500 mL    Solution: 200 mL  Total IN: 700 mL    OUT:    Voided: 2800 mL  Total OUT: 2800 mL    Total NET: -2100 mL      10 Aug 2019 07:01  -  11 Aug 2019 02:27  --------------------------------------------------------  IN:  Total IN: 0 mL    OUT:    Voided: 900 mL  Total OUT: 900 mL    Total NET: -900 mL          LABS:                        8.2    7.52  )-----------( 436      ( 10 Aug 2019 08:20 )             27.8     08-10    140  |  104  |  12.0  ----------------------------<  183<H>  4.0   |  26.0  |  0.50    Ca    8.2<L>      10 Aug 2019 08:20

## 2019-08-11 NOTE — PROGRESS NOTE ADULT - ASSESSMENT
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 Pt admitted with severe sepsis secondary to wet gangrene of RLE, complicated by maggot infestation of wound. Vascular sx consulted and pt s/p wound cleansing and went to the OR for emergent source control and s/p  Guillotine R BKA. Sepsis resolved post sx. Empirically on vanco/ zosyn, Bcx negative. Hospital course complicated by acute on chronic anemia in the setting of AOCD, s/p 2 u prbc on admission and 1 u prbc intraop with appropriate response to hgb, remains stable thereafter. FOBT pending but no evidence of active bleeding. Also noted with BRANT/ hyponatremia likely due to dehydration, s/p ivf fluid resuscitation with improving renal function and sodium. Slow clinical improvement. ID, Podiatry, Vascular, PM and Psych consults noted. PT consult noted, recommends acute rehab. OT and PM&R consults noted. PM consulted noted, pain regimen adjusted s/p revision sx.     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  POD #4 s/p right AKA revision with proximal femoral nerve resection.  - currently with down trending leukocytosis to wnl   - elevated esr/ crp   - afebrile  and normotensive - B cx negative X 2  - c/w Zosyn 3.375 g IV q8h D#11, off vanco   - pain control as per pain mx, no role for suboxone until off of acute narcotics   - c/w Dilaudid on a sliding scale, robaxin and gabapentin   - pt will not receive IV narcotics only sq given prior abuse   - c/w colace for prn constipation   - c/w local wound care as per vascular sx   - Monitor BM - if worsening, will check for c.diff  - ID f/u noted and appreciated. Pt will c/w abx until 4 days s/p BKA revision - stop date 8/11   - vascular sx f/u  noted and appreciated.  POD 3 s/p right AKA revision with proximal femoral nerve resection. Amputee support team on board. Representatives from prosthetic devices at bedside this am.  - Podiatry, pain management and psych also on board   -PT consult noted. Recommends acute rehab and also OT and PM&R consults.     Sinus tachycardia  - Stable now. If persists will consult Cardiology. Repeat EKG sinus tachycardia. Lopressor 5mg IVP once for HR >120, to be given if tachycardia persists.  - Pt denies CP, SOB. Repeat EKG done today sinus tachycardia. Unchanged from ekg on admission and 8/3  - TTE: Normal LVSF, EF 65-70; No right heart strain; Mild Pul HTN; mod enlarged LA  -c/w low dose bb     Depression  -Confirmed by PMD Dr. Pierce  -C/w Cymbalta 60mg BID; seen by psych, will re consult due to concerns for depression and also when ready to transition pt to suboxone     BRANT - resolved  - likely secondary to dehydration; severe sepsis now resolved   -Improving renal fxn to wnl  -sodium now wnl  Tolerating PO intake    B/l groin intertriginous dermatitis  -C/w Nystatin powder bid  -Continue to monitor for signs of infection     Hypocalcemia  -corrected, will monitor. Now calcium level noted, will add albumin    Acute on Chronic Anemia   - noted AOCD, now stable   - h/h remains stable   -Initial Hb 8.3, downtrended to 6.4   - likely bc pt was initially dehydrated   -s/p 2U PRBCs on admision and 1 u prbc intra op   - FOBT pending, no evidence of bleeding   -Iron panel suggest AOCD   - will monitor hgb and transfuse for hgb <7   -C/w iron supplement, - c/w vitamin c     IDDM-2 poorly controlled  -HbA1c 7  - c/w accuchecks ACHS TID  - c/w hypoglycemia protocol     HTN/HLD  -C/w Metoprolol  -C/w Enalapril   -c/w lipitor     H/o polysubstance use disorder - currently taking Suboxone  -Utox negative  -Last dose of Suboxone was day prior to hospitalization   -currently pt requiring narcotics for pain control   - labile emotions - psych reconsult for signs of depression  -Psych consult noted and appreciated, will reconsult when pt is off narcotics or defer to outpt management     Preventative Measures  DVT ppx: started Lovenox 40 - benefits outweigh risk. s/p BKA, immobile, sedentary, platelets stable, renal functions wnl.    Advanced Directive: Full code  Next of kin: Brother Jose    Dispo:  Likely d/c to [facility]. Pt eval noted. OT and PM&R consults noted, pt needs facility that accepts Suboxone use. 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 Pt admitted with severe sepsis secondary to wet gangrene of RLE, complicated by maggot infestation of wound. Vascular sx consulted and pt s/p wound cleansing and went to the OR for emergent source control and s/p  Guillotine R BKA. Sepsis resolved post sx. Empirically on vanco/ zosyn, Bcx negative. Hospital course complicated by acute on chronic anemia in the setting of AOCD, s/p 2 u prbc on admission and 1 u prbc intraop with appropriate response to hgb, remains stable thereafter. FOBT pending but no evidence of active bleeding. Also noted with BRANT/ hyponatremia likely due to dehydration, s/p ivf fluid resuscitation with improving renal function and sodium. Slow clinical improvement. ID, Podiatry, Vascular, PM and Psych consults noted. PT consult noted, recommends acute rehab. OT and PM&R consults noted. PM consulted noted, pain regimen adjusted s/p revision sx.     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  POD #4 s/p right AKA revision with proximal femoral nerve resection.  - currently with down trending leukocytosis to wnl   - elevated esr/ crp   - afebrile  and normotensive - B cx negative X 2  - c/w Zosyn 3.375 g IV q8h D#11, off vanco   - pain control as per pain mx, no role for suboxone until off of acute narcotics   - c/w Dilaudid on a sliding scale, robaxin and gabapentin   - c/w colace for prn constipation   - c/w local wound care as per vascular sx   - ID f/u noted and appreciated. Pt will c/w abx until 4 days s/p BKA revision - stop date 8/11   - vascular sx f/u  noted and appreciated.  POD 4 s/p right AKA revision with proximal femoral nerve resection. Amputee support team on board. Representatives from prosthetic devices at bedside this am.  - Podiatry, pain management and psych also on board   -PT consult noted. Recommends acute rehab and also OT and PM&R consults.     Sinus tachycardia - at this time could also be due to pain   - Stable now. If persists will consult Cardiology. Repeat EKG sinus tachycardia. Lopressor 5mg IVP once for HR >120, to be given if tachycardia persists.  - Pt denies CP, SOB. Repeat EKG done today sinus tachycardia. Unchanged from ekg on admission and 8/3  - TTE: Normal LVSF, EF 65-70; No right heart strain; Mild Pul HTN; mod enlarged LA  -c/w low dose bb     Depression  -Confirmed by PMD Dr. Pierce  -C/w Cymbalta 60mg BID; seen by psych, will re consult due to concerns for depression and also when ready to transition pt to suboxone     BRANT - resolved  - likely secondary to dehydration; severe sepsis now resolved   -Improving renal fxn to wnl  -sodium now wnl  Tolerating PO intake    B/l groin intertriginous dermatitis  -C/w Nystatin powder bid  -Continue to monitor for signs of infection     Hypocalcemia  -corrected amount is 9.4, no further intervention     Acute on Chronic Anemia   - noted AOCD, now stable   -Initial Hb 8.3, downtrended to 6.4   - likely bc pt was initially dehydrated   -s/p 2U PRBCs on admision and 1 u prbc intra op   - FOBT pending, no evidence of bleeding   - will monitor hgb and transfuse for hgb <7   -C/w iron supplement, - c/w vitamin c     IDDM-2 poorly controlled  -HbA1c 7  - c/w accuchecks ACHS TID  - c/w hypoglycemia protocol     HTN/HLD  -C/w Metoprolol, Enalapril - slightly elevated likely due to pain   -c/w lipitor     H/o polysubstance use disorder - currently taking Suboxone  -Utox negative  -Last dose of Suboxone was day prior to hospitalization   -currently pt requiring narcotics for pain control   - labile emotions - psych reconsult for signs of depression  -Psych consult noted and appreciated, will reconsult when pt is off narcotics or defer to outpt management     Preventative Measures  DVT ppx: started Lovenox 40 - benefits outweigh risk. s/p BKA, immobile, sedentary, platelets stable, renal functions wnl.    Advanced Directive: Full code  Next of kin: Brother Jose    Dispo:  Likely d/c to [facility]. Pt eval noted. OT and PM&R consults noted, pt needs facility that accepts Suboxone use. 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 Pt admitted with severe sepsis secondary to wet gangrene of RLE, complicated by maggot infestation of wound. Vascular sx consulted and pt s/p wound cleansing and went to the OR for emergent source control and s/p  Guillotine R BKA. Sepsis resolved post sx. Empirically on vanco/ zosyn, Bcx negative. Hospital course complicated by acute on chronic anemia in the setting of AOCD, s/p 2 u prbc on admission and 1 u prbc intraop with appropriate response to hgb, remains stable thereafter. FOBT pending but no evidence of active bleeding. Also noted with BRANT/ hyponatremia likely due to dehydration, s/p ivf fluid resuscitation with improving renal function and sodium. Slow clinical improvement. ID, Podiatry, Vascular, PM and Psych consults noted. PT consult noted, recommends acute rehab. OT and PM&R consults noted. PM consulted noted, pain regimen adjusted s/p revision sx.     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  POD #4 s/p right AKA revision with proximal femoral nerve resection.  - currently with down trending leukocytosis to wnl   - elevated esr/ crp   - afebrile  and normotensive - B cx negative X 2  - c/w Zosyn 3.375 g IV q8h D#11, off vanco   - pain control as per pain mx, no role for suboxone until off of acute narcotics   - c/w Dilaudid on a sliding scale, robaxin and gabapentin   - c/w colace for prn constipation   - c/w local wound care as per vascular sx   - ID f/u noted and appreciated. Pt will c/w abx until 4 days s/p BKA revision - stop date 8/11   - vascular sx f/u  noted and appreciated.  POD 4 s/p right AKA revision with proximal femoral nerve resection. Amputee support team on board. Representatives from prosthetic devices at bedside this am.  - Podiatry, pain management and psych also on board   -PT consult noted. Recommends acute rehab and also OT and PM&R consults.     Sinus tachycardia - at this time could also be due to pain   - Stable now. If persists will consult Cardiology. Repeat EKG sinus tachycardia. Lopressor 5mg IVP once for HR >120, to be given if tachycardia persists.  - Pt denies CP, SOB. Repeat EKG done today sinus tachycardia. Unchanged from ekg on admission and 8/3  - TTE: Normal LVSF, EF 65-70; No right heart strain; Mild Pul HTN; mod enlarged LA  -c/w low dose bb     Depression  -Confirmed by PMD Dr. Pierce  -C/w Cymbalta 60mg BID; seen by psych, will re consult due to concerns for depression and also when ready to transition pt to suboxone     BRANT - resolved  - likely secondary to dehydration; severe sepsis now resolved   -Improving renal fxn to wnl  -sodium now wnl  Tolerating PO intake    B/l groin intertriginous dermatitis  -C/w Nystatin powder bid  -Continue to monitor for signs of infection     Hypocalcemia  -corrected amount is 9.4, no further intervention     Acute on Chronic Anemia   - noted AOCD, now stable   -Initial Hb 8.3, downtrended to 6.4   - likely bc pt was initially dehydrated   -s/p 2U PRBCs on admision and 1 u prbc intra op   - FOBT pending, no evidence of bleeding   - will monitor hgb and transfuse for hgb <7   -C/w iron supplement, - c/w vitamin c     IDDM-2 poorly controlled  -HbA1c 7  - c/w accuchecks ACHS TID  - c/w hypoglycemia protocol; will add low dose lantus     HTN/HLD  -C/w Metoprolol, Enalapril - slightly elevated likely due to pain   -c/w lipitor     H/o polysubstance use disorder - currently taking Suboxone  -Utox negative  -Last dose of Suboxone was day prior to hospitalization   -currently pt requiring narcotics for pain control   - labile emotions - psych reconsult for signs of depression  -Psych consult noted and appreciated, will reconsult when pt is off narcotics or defer to outpt management     Preventative Measures  DVT ppx: started Lovenox 40 - benefits outweigh risk. s/p BKA, immobile, sedentary, platelets stable, renal functions wnl.    Advanced Directive: Full code  Next of kin: Brothalma Garcia    Dispo:  Likely d/c to [facility]. Pt eval noted. OT and PM&R consults noted, pt needs facility that accepts Suboxone use. 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 Pt admitted with severe sepsis secondary to wet gangrene of RLE, complicated by maggot infestation of wound. Vascular sx consulted and pt s/p wound cleansing and went to the OR for emergent source control and s/p  Guillotine R BKA. Sepsis resolved post sx. Empirically on vanco/ zosyn, Bcx negative. Hospital course complicated by acute on chronic anemia in the setting of AOCD, s/p 2 u prbc on admission and 1 u prbc intraop with appropriate response to hgb, remains stable thereafter. FOBT pending but no evidence of active bleeding. Also noted with BRANT/ hyponatremia likely due to dehydration, s/p ivf fluid resuscitation with improving renal function and sodium. Slow clinical improvement. ID, Podiatry, Vascular, PM and Psych consults noted. PT consult noted, recommends acute rehab. OT and PM&R consults noted. PM consulted noted, pain regimen adjusted s/p revision sx.     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  POD #4 s/p right AKA revision with proximal femoral nerve resection.  - currently with down trending leukocytosis to wnl   - elevated esr/ crp   - afebrile  and normotensive - B cx negative X 2  - completed Zosyn 3.375 g IV q8h D#11 today, Off vanco   - pain control as per pain mx, no role for suboxone until off of acute narcotics   - c/w Dilaudid on a sliding scale, robaxin and gabapentin   -c/w Dilaudid 0.5mg BID PRN or severe/breakthrough pain   - c/w colace for prn constipation   - c/w local wound care as per vascular sx   - ID f/u noted and appreciated. Pt will c/w abx until 4 days s/p BKA revision - stop date 8/11 (today)   - vascular sx f/u  noted and appreciated.  POD #4 s/p right AKA revision with proximal femoral nerve resection. Amputee support team on board. Representatives from prosthetic devices were at bedside previously.  - Podiatry, pain management and psych also on board   -PT consult noted. Recommends acute rehab and also OT and PM&R consults.     Sinus tachycardia - at this time could also be due to pain   - Stable now. If persists will consult Cardiology. Repeat EKG sinus tachycardia. Lopressor 5mg IVP once for HR >120, to be given if tachycardia persists.  - Pt denies CP, SOB.  - TTE: Normal LVSF, EF 65-70; No right heart strain; Mild Pul HTN; mod enlarged LA  -c/w low dose bb     Depression  -Confirmed by PMD Dr. Pierce  -C/w Cymbalta 60mg BID; seen by psych, will re consult due to concerns for depression and also when ready to transition pt to suboxone.     BRANT - resolved  - likely secondary to dehydration; severe sepsis now resolved   -Improving renal fxn to wnl  -sodium now wnl  Tolerating PO intake    B/l groin intertriginous dermatitis  -C/w Nystatin powder bid  -Continue to monitor for signs of infection     Hypocalcemia  -corrected amount is 9.4, no further intervention     Acute on Chronic Anemia   - noted AOCD, now stable   -Initial Hb 8.3, downtrended to 6.4   - likely bc pt was initially dehydrated   -s/p 2U PRBCs on admision and 1 u prbc intra op   - FOBT pending, no evidence of bleeding   - will monitor hgb and transfuse for hgb <7   -C/w iron supplement, - c/w vitamin c     IDDM-2 poorly controlled  -HbA1c 7  - c/w accuchecks ACHS TID  - pt with am sugars 170-190, to complete last abx in dextrose today.   - added 7 units lantus, c/w ISS  - will adjust accordingly    HTN/HLD  -C/w Metoprolol, Enalapril. BP slightly elevated likely due to pain   -c/w lipitor     H/o polysubstance use disorder - currently taking Suboxone  -Utox negative  -Last dose of Suboxone was day prior to hospitalization   -currently pt requiring narcotics for pain control   - labile emotions - psych reconsult for signs of depression  -Psych consult noted and appreciated, will reconsult when pt is off narcotics or defer to outpt management     Preventative Measures  DVT ppx: started Lovenox 40 - benefits outweigh risk. s/p BKA, immobile, sedentary, platelets stable, renal functions wnl.    Advanced Directive: Full code  Next of kin: Brother Jose    Dispo:  Likely d/c to [facility]. Pt eval noted. OT and PM&R consults noted, pt needs facility that accepts Suboxone use.

## 2019-08-11 NOTE — PROGRESS NOTE ADULT - ASSESSMENT
55yo M s/p right AKA with proximal femoral nerve resection; stable this morning. Patient's pain was worsens during dressing change, but otherwise controlled.    - Daily AKA dressing changes (xeroform, ABD, and ACE wrap)  - ID recs   - piperacillin/tazobactam to d/c today, 8/11  - DVT ppx  - PMNR recs [facility] vs acute rehab  - pain per Pain management, possibly restart Suboxone?  - DM management  - incentive spirometry 55yo M s/p right AKA with proximal femoral nerve resection; stable this morning. Patient's pain was worsens during dressing change, but otherwise controlled.    - Daily AKA dressing changes (xeroform, ABD, and ACE wrap)  - ID recs   - piperacillin/tazobactam to d/c today, 8/11  - DVT ppx  - PMNR recs [facility] vs acute rehab  - pain per Pain management, possibly restart Suboxone?  - DM management  - incentive spirometry  - Dispo per primary team

## 2019-08-11 NOTE — PROGRESS NOTE ADULT - SUBJECTIVE AND OBJECTIVE BOX
Patient is a 54y old  Male who presents with a chief complaint of sepsis, foot wound (01 Aug 2019 13:18) s/p BKA controlling pain     Overnight AM events:  Patient seen and examined at beside. No acute events over night. POD #10 s/p Right BKA and POD #4 s/p right AKA revision with proximal femoral nerve resection. Pt was complaining of pain, he was receiving his morning meds.  States pain is normally 10/10 without pain meds but improves to 7/10 after medication. Was still in pain after AM dose of Dilaudid and required a 0.5mg IV dose with relief. Pt is tolerating PO diet w/o n/v/diarrhea. Pt is voiding and last BM was yesterday.    ROS: Denies CP, HA, SOB, n/v/, fever/chills, abdominal/back pain, vision changes, numbness/tingling, blood in urine or stool. All other ROS negative     CAPILLARY BLOOD GLUCOSE    POCT Blood Glucose.: 292 mg/dL (11 Aug 2019 17:06)  POCT Blood Glucose.: 228 mg/dL (11 Aug 2019 12:16)  POCT Blood Glucose.: 173 mg/dL (11 Aug 2019 08:01)  POCT Blood Glucose.: 238 mg/dL (10 Aug 2019 23:09)    Vital Signs Last 24 Hrs    T(F): 97.8 (11 Aug 2019 16:25), Max: 98.1 (11 Aug 2019 01:13)  HR: 116 (11 Aug 2019 16:25) (100 - 116)  BP: 144/86 (11 Aug 2019 16:25) (144/86 - 157/95)  RR: 18 (11 Aug 2019 16:25) (18 - 18)  SpO2: 98% (11 Aug 2019 16:25) (93% - 98%)    GENERAL: Obese, WD WN, poor hygiene   HEENT: PERRLA, pupil constricted to light b/l, poor dentition   RESP: CTA b/l,, no crackles, no wheezing or rhonchi   CVS: RRR, Normal S1 & S2, No m/r/g  GI: soft +BS normoactive, NT, ND, b/l groin intertriginous dermatitis - no signs of infection   Extremities: Rt foot s/p R AKA, L foot - superficial ulcer 4x5cm, no active drainage, s/p partial amputation of foot clean dry no evidence of infection. Healing well.   Neuro: AAOX3, non focal   Skin: as above                           8.7    7.45  )-----------( 438      ( 11 Aug 2019 10:10 )             29.4     08-11    142  |  105  |  10.0  ----------------------------<  218<H>  3.2<L>   |  26.0  |  0.52    Ca    8.1<L>      11 Aug 2019 10:10  Phos  3.2     08-11  Mg     1.1     08-11    TPro  5.8<L>  /  Alb  2.4<L>  /  TBili  0.2<L>  /  DBili  x   /  AST  14  /  ALT  10  /  AlkPhos  285<H>  08-11      IMAGING  < from: TTE Echo Complete w/Doppler (08.03.19 @ 13:18) >  Summary:   1. Technically limited study.   2. Normal left ventricular internal cavity size.   3. Normal global left ventricular systolic function.   4. Left ventricular ejection fraction, by visual estimation, is 65 to   70%.   5. The left ventricular diastolic function could not be assessed in this   study.   6. Normal right ventricular size and function.   7. Moderately enlarged left atrium.   8. Normal trileaflet aortic valve with normal opening.   9. Estimated pulmonary artery systolic pressure is 45.4 mmHg assuming a   right atrial pressure of 5 mmHg, which is consistent with mild pulmonary   hypertension.  10. There is no evidence of pericardial effusion.    < end of copied text >      < from: Xray Ankle Complete 3 Views, Right (07.31.19 @ 22:25) >  IMPRESSION:   Soft tissue swelling, ulcerations consistent with diffuse   osteomyelitis involving the phalanges and metatarsal bones tarsal bones   possibly ankle joint/show bilateral medial malleolus.    < end of copied text >    < from: Xray Foot AP + Lateral + Oblique, Right (07.31.19 @ 22:25) >  MPRESSION:   Soft tissue swelling, ulcerations consistent with diffuse   MEDICATIONS  (STANDING):  acetaminophen   Tablet .. 975 milliGRAM(s) Oral every 8 hours  ascorbic acid 500 milliGRAM(s) Oral daily  atorvastatin 40 milliGRAM(s) Oral at bedtime  dextrose 5%. 1000 milliLiter(s) (50 mL/Hr) IV Continuous <Continuous>  dextrose 50% Injectable 12.5 Gram(s) IV Push once  dextrose 50% Injectable 25 Gram(s) IV Push once  dextrose 50% Injectable 25 Gram(s) IV Push once  DULoxetine 60 milliGRAM(s) Oral two times a day  enalapril 20 milliGRAM(s) Oral daily  enoxaparin Injectable 40 milliGRAM(s) SubCutaneous daily  ferrous    sulfate 325 milliGRAM(s) Oral four times a day  folic acid 1 milliGRAM(s) Oral daily  gabapentin 300 milliGRAM(s) Oral three times a day  insulin glargine Injectable (LANTUS) 10 Unit(s) SubCutaneous at bedtime  insulin lispro (HumaLOG) corrective regimen sliding scale   SubCutaneous three times a day before meals  methocarbamol 750 milliGRAM(s) Oral every 8 hours  metoprolol tartrate 50 milliGRAM(s) Oral two times a day  multivitamin 1 Tablet(s) Oral daily  pantoprazole    Tablet 40 milliGRAM(s) Oral before breakfast  piperacillin/tazobactam IVPB.. 3.375 Gram(s) IV Intermittent every 8 hours  saccharomyces boulardii 250 milliGRAM(s) Oral two times a day  silver sulfADIAZINE 1% Cream 1 Application(s) Topical daily  thiamine 100 milliGRAM(s) Oral daily    MEDICATIONS  (PRN):  aluminum hydroxide/magnesium hydroxide/simethicone Suspension 30 milliLiter(s) Oral every 6 hours PRN Dyspepsia  dextrose 40% Gel 15 Gram(s) Oral once PRN Blood Glucose LESS THAN 70 milliGRAM(s)/deciliter  glucagon  Injectable 1 milliGRAM(s) IntraMuscular once PRN Glucose LESS THAN 70 milligrams/deciliter  HYDROmorphone   Tablet 2 milliGRAM(s) Oral every 4 hours PRN Moderate Pain (4 - 6)  HYDROmorphone   Tablet 4 milliGRAM(s) Oral every 4 hours PRN Severe Pain (7 - 10)  HYDROmorphone  Injectable 0.5 milliGRAM(s) IV Push two times a day PRN Severe Pain (7 - 10)  metoprolol tartrate IVPB 5 milliGRAM(s) IV Intermittent once PRN HR greater than 120  zolpidem 5 milliGRAM(s) Oral at bedtime PRN Insomnia  osteomyelitis involving the phalanges and metatarsal bones tarsal bones   possibly ankle joint/show bilateral medial malleolus.    < end of copied text >    < from: Xray Chest 1 View-PORTABLE IMMEDIATE (07.31.19 @ 20:04) >  Impression:  No evidence of acute cardiopulmonary disease. Allowing for differences in   technique, no significant change since 5/11/2016..    < end of copied text >      MEDICATIONS  (STANDING):  acetaminophen   Tablet .. 975 milliGRAM(s) Oral every 8 hours  ascorbic acid 500 milliGRAM(s) Oral daily  atorvastatin 40 milliGRAM(s) Oral at bedtime  dextrose 5%. 1000 milliLiter(s) (50 mL/Hr) IV Continuous <Continuous>  dextrose 50% Injectable 12.5 Gram(s) IV Push once  dextrose 50% Injectable 25 Gram(s) IV Push once  dextrose 50% Injectable 25 Gram(s) IV Push once  DULoxetine 60 milliGRAM(s) Oral two times a day  enalapril 20 milliGRAM(s) Oral daily  enoxaparin Injectable 40 milliGRAM(s) SubCutaneous daily  ferrous    sulfate 325 milliGRAM(s) Oral four times a day  folic acid 1 milliGRAM(s) Oral daily  gabapentin 300 milliGRAM(s) Oral three times a day  insulin glargine Injectable (LANTUS) 10 Unit(s) SubCutaneous at bedtime  insulin lispro (HumaLOG) corrective regimen sliding scale   SubCutaneous three times a day before meals  methocarbamol 750 milliGRAM(s) Oral every 8 hours  metoprolol tartrate 50 milliGRAM(s) Oral two times a day  multivitamin 1 Tablet(s) Oral daily  pantoprazole    Tablet 40 milliGRAM(s) Oral before breakfast  piperacillin/tazobactam IVPB.. 3.375 Gram(s) IV Intermittent every 8 hours  saccharomyces boulardii 250 milliGRAM(s) Oral two times a day  silver sulfADIAZINE 1% Cream 1 Application(s) Topical daily  thiamine 100 milliGRAM(s) Oral daily    MEDICATIONS  (PRN):  aluminum hydroxide/magnesium hydroxide/simethicone Suspension 30 milliLiter(s) Oral every 6 hours PRN Dyspepsia  dextrose 40% Gel 15 Gram(s) Oral once PRN Blood Glucose LESS THAN 70 milliGRAM(s)/deciliter  glucagon  Injectable 1 milliGRAM(s) IntraMuscular once PRN Glucose LESS THAN 70 milligrams/deciliter  HYDROmorphone   Tablet 2 milliGRAM(s) Oral every 4 hours PRN Moderate Pain (4 - 6)  HYDROmorphone   Tablet 4 milliGRAM(s) Oral every 4 hours PRN Severe Pain (7 - 10)  HYDROmorphone  Injectable 0.5 milliGRAM(s) IV Push two times a day PRN Severe Pain (7 - 10)  metoprolol tartrate IVPB 5 milliGRAM(s) IV Intermittent once PRN HR greater than 120  zolpidem 5 milliGRAM(s) Oral at bedtime PRN Insomnia

## 2019-08-12 LAB
ALBUMIN SERPL ELPH-MCNC: 3.1 G/DL — LOW (ref 3.3–5.2)
ALP SERPL-CCNC: 275 U/L — HIGH (ref 40–120)
ALT FLD-CCNC: 12 U/L — SIGNIFICANT CHANGE UP
ANION GAP SERPL CALC-SCNC: 12 MMOL/L — SIGNIFICANT CHANGE UP (ref 5–17)
AST SERPL-CCNC: 15 U/L — SIGNIFICANT CHANGE UP
BASOPHILS # BLD AUTO: 0.11 K/UL — SIGNIFICANT CHANGE UP (ref 0–0.2)
BASOPHILS NFR BLD AUTO: 1.1 % — SIGNIFICANT CHANGE UP (ref 0–2)
BILIRUB SERPL-MCNC: 0.3 MG/DL — LOW (ref 0.4–2)
BUN SERPL-MCNC: 10 MG/DL — SIGNIFICANT CHANGE UP (ref 8–20)
CALCIUM SERPL-MCNC: 8.4 MG/DL — LOW (ref 8.6–10.2)
CHLORIDE SERPL-SCNC: 106 MMOL/L — SIGNIFICANT CHANGE UP (ref 98–107)
CO2 SERPL-SCNC: 29 MMOL/L — SIGNIFICANT CHANGE UP (ref 22–29)
CREAT SERPL-MCNC: 0.44 MG/DL — LOW (ref 0.5–1.3)
EOSINOPHIL # BLD AUTO: 0.29 K/UL — SIGNIFICANT CHANGE UP (ref 0–0.5)
EOSINOPHIL NFR BLD AUTO: 2.9 % — SIGNIFICANT CHANGE UP (ref 0–6)
GLUCOSE BLDC GLUCOMTR-MCNC: 176 MG/DL — HIGH (ref 70–99)
GLUCOSE BLDC GLUCOMTR-MCNC: 214 MG/DL — HIGH (ref 70–99)
GLUCOSE BLDC GLUCOMTR-MCNC: 277 MG/DL — HIGH (ref 70–99)
GLUCOSE BLDC GLUCOMTR-MCNC: 278 MG/DL — HIGH (ref 70–99)
GLUCOSE SERPL-MCNC: 170 MG/DL — HIGH (ref 70–115)
HCT VFR BLD CALC: 29.9 % — LOW (ref 39–50)
HGB BLD-MCNC: 8.9 G/DL — LOW (ref 13–17)
IMM GRANULOCYTES NFR BLD AUTO: 0.6 % — SIGNIFICANT CHANGE UP (ref 0–1.5)
LYMPHOCYTES # BLD AUTO: 1.25 K/UL — SIGNIFICANT CHANGE UP (ref 1–3.3)
LYMPHOCYTES # BLD AUTO: 12.6 % — LOW (ref 13–44)
MAGNESIUM SERPL-MCNC: 1.4 MG/DL — LOW (ref 1.6–2.6)
MCHC RBC-ENTMCNC: 26.5 PG — LOW (ref 27–34)
MCHC RBC-ENTMCNC: 29.8 GM/DL — LOW (ref 32–36)
MCV RBC AUTO: 89 FL — SIGNIFICANT CHANGE UP (ref 80–100)
MONOCYTES # BLD AUTO: 0.8 K/UL — SIGNIFICANT CHANGE UP (ref 0–0.9)
MONOCYTES NFR BLD AUTO: 8.1 % — SIGNIFICANT CHANGE UP (ref 2–14)
NEUTROPHILS # BLD AUTO: 7.4 K/UL — SIGNIFICANT CHANGE UP (ref 1.8–7.4)
NEUTROPHILS NFR BLD AUTO: 74.7 % — SIGNIFICANT CHANGE UP (ref 43–77)
PHOSPHATE SERPL-MCNC: 3.6 MG/DL — SIGNIFICANT CHANGE UP (ref 2.4–4.7)
PLATELET # BLD AUTO: 544 K/UL — HIGH (ref 150–400)
POTASSIUM SERPL-MCNC: 4 MMOL/L — SIGNIFICANT CHANGE UP (ref 3.5–5.3)
POTASSIUM SERPL-SCNC: 4 MMOL/L — SIGNIFICANT CHANGE UP (ref 3.5–5.3)
PROT SERPL-MCNC: 6.4 G/DL — LOW (ref 6.6–8.7)
RBC # BLD: 3.36 M/UL — LOW (ref 4.2–5.8)
RBC # FLD: 18.3 % — HIGH (ref 10.3–14.5)
SODIUM SERPL-SCNC: 147 MMOL/L — HIGH (ref 135–145)
WBC # BLD: 9.91 K/UL — SIGNIFICANT CHANGE UP (ref 3.8–10.5)
WBC # FLD AUTO: 9.91 K/UL — SIGNIFICANT CHANGE UP (ref 3.8–10.5)

## 2019-08-12 PROCEDURE — 99232 SBSQ HOSP IP/OBS MODERATE 35: CPT | Mod: GC

## 2019-08-12 PROCEDURE — 99232 SBSQ HOSP IP/OBS MODERATE 35: CPT

## 2019-08-12 RX ORDER — SODIUM CHLORIDE 9 MG/ML
1000 INJECTION, SOLUTION INTRAVENOUS
Refills: 0 | Status: COMPLETED | OUTPATIENT
Start: 2019-08-12 | End: 2019-08-12

## 2019-08-12 RX ORDER — MAGNESIUM SULFATE 500 MG/ML
1 VIAL (ML) INJECTION ONCE
Refills: 0 | Status: COMPLETED | OUTPATIENT
Start: 2019-08-12 | End: 2019-08-12

## 2019-08-12 RX ORDER — GABAPENTIN 400 MG/1
600 CAPSULE ORAL THREE TIMES A DAY
Refills: 0 | Status: DISCONTINUED | OUTPATIENT
Start: 2019-08-12 | End: 2019-08-13

## 2019-08-12 RX ORDER — HYDROMORPHONE HYDROCHLORIDE 2 MG/ML
0.5 INJECTION INTRAMUSCULAR; INTRAVENOUS; SUBCUTANEOUS DAILY
Refills: 0 | Status: DISCONTINUED | OUTPATIENT
Start: 2019-08-12 | End: 2019-08-13

## 2019-08-12 RX ORDER — KETOROLAC TROMETHAMINE 30 MG/ML
15 SYRINGE (ML) INJECTION EVERY 12 HOURS
Refills: 0 | Status: DISCONTINUED | OUTPATIENT
Start: 2019-08-12 | End: 2019-08-13

## 2019-08-12 RX ADMIN — HYDROMORPHONE HYDROCHLORIDE 4 MILLIGRAM(S): 2 INJECTION INTRAMUSCULAR; INTRAVENOUS; SUBCUTANEOUS at 23:37

## 2019-08-12 RX ADMIN — ZOLPIDEM TARTRATE 5 MILLIGRAM(S): 10 TABLET ORAL at 23:37

## 2019-08-12 RX ADMIN — HYDROMORPHONE HYDROCHLORIDE 0.5 MILLIGRAM(S): 2 INJECTION INTRAMUSCULAR; INTRAVENOUS; SUBCUTANEOUS at 08:25

## 2019-08-12 RX ADMIN — METHOCARBAMOL 750 MILLIGRAM(S): 500 TABLET, FILM COATED ORAL at 05:24

## 2019-08-12 RX ADMIN — HYDROMORPHONE HYDROCHLORIDE 4 MILLIGRAM(S): 2 INJECTION INTRAMUSCULAR; INTRAVENOUS; SUBCUTANEOUS at 16:32

## 2019-08-12 RX ADMIN — Medication 15 MILLIGRAM(S): at 17:50

## 2019-08-12 RX ADMIN — Medication 325 MILLIGRAM(S): at 12:14

## 2019-08-12 RX ADMIN — INSULIN GLARGINE 10 UNIT(S): 100 INJECTION, SOLUTION SUBCUTANEOUS at 23:49

## 2019-08-12 RX ADMIN — HYDROMORPHONE HYDROCHLORIDE 2 MILLIGRAM(S): 2 INJECTION INTRAMUSCULAR; INTRAVENOUS; SUBCUTANEOUS at 01:26

## 2019-08-12 RX ADMIN — Medication 325 MILLIGRAM(S): at 23:37

## 2019-08-12 RX ADMIN — HYDROMORPHONE HYDROCHLORIDE 0.5 MILLIGRAM(S): 2 INJECTION INTRAMUSCULAR; INTRAVENOUS; SUBCUTANEOUS at 08:05

## 2019-08-12 RX ADMIN — PANTOPRAZOLE SODIUM 40 MILLIGRAM(S): 20 TABLET, DELAYED RELEASE ORAL at 05:25

## 2019-08-12 RX ADMIN — ATORVASTATIN CALCIUM 40 MILLIGRAM(S): 80 TABLET, FILM COATED ORAL at 23:03

## 2019-08-12 RX ADMIN — HYDROMORPHONE HYDROCHLORIDE 2 MILLIGRAM(S): 2 INJECTION INTRAMUSCULAR; INTRAVENOUS; SUBCUTANEOUS at 07:10

## 2019-08-12 RX ADMIN — Medication 975 MILLIGRAM(S): at 15:00

## 2019-08-12 RX ADMIN — Medication 325 MILLIGRAM(S): at 00:58

## 2019-08-12 RX ADMIN — Medication 250 MILLIGRAM(S): at 17:31

## 2019-08-12 RX ADMIN — HYDROMORPHONE HYDROCHLORIDE 2 MILLIGRAM(S): 2 INJECTION INTRAMUSCULAR; INTRAVENOUS; SUBCUTANEOUS at 00:56

## 2019-08-12 RX ADMIN — Medication 15 MILLIGRAM(S): at 17:31

## 2019-08-12 RX ADMIN — GABAPENTIN 600 MILLIGRAM(S): 400 CAPSULE ORAL at 15:04

## 2019-08-12 RX ADMIN — HYDROMORPHONE HYDROCHLORIDE 0.5 MILLIGRAM(S): 2 INJECTION INTRAMUSCULAR; INTRAVENOUS; SUBCUTANEOUS at 20:39

## 2019-08-12 RX ADMIN — Medication 975 MILLIGRAM(S): at 15:50

## 2019-08-12 RX ADMIN — METHOCARBAMOL 750 MILLIGRAM(S): 500 TABLET, FILM COATED ORAL at 23:37

## 2019-08-12 RX ADMIN — HYDROMORPHONE HYDROCHLORIDE 4 MILLIGRAM(S): 2 INJECTION INTRAMUSCULAR; INTRAVENOUS; SUBCUTANEOUS at 04:02

## 2019-08-12 RX ADMIN — Medication 1 MILLIGRAM(S): at 12:14

## 2019-08-12 RX ADMIN — Medication 975 MILLIGRAM(S): at 23:03

## 2019-08-12 RX ADMIN — Medication 325 MILLIGRAM(S): at 17:31

## 2019-08-12 RX ADMIN — HYDROMORPHONE HYDROCHLORIDE 4 MILLIGRAM(S): 2 INJECTION INTRAMUSCULAR; INTRAVENOUS; SUBCUTANEOUS at 03:32

## 2019-08-12 RX ADMIN — Medication 50 MILLIGRAM(S): at 17:31

## 2019-08-12 RX ADMIN — DULOXETINE HYDROCHLORIDE 60 MILLIGRAM(S): 30 CAPSULE, DELAYED RELEASE ORAL at 17:30

## 2019-08-12 RX ADMIN — Medication 975 MILLIGRAM(S): at 05:24

## 2019-08-12 RX ADMIN — Medication 3: at 12:15

## 2019-08-12 RX ADMIN — SODIUM CHLORIDE 100 MILLILITER(S): 9 INJECTION, SOLUTION INTRAVENOUS at 15:04

## 2019-08-12 RX ADMIN — HYDROMORPHONE HYDROCHLORIDE 0.5 MILLIGRAM(S): 2 INJECTION INTRAMUSCULAR; INTRAVENOUS; SUBCUTANEOUS at 20:09

## 2019-08-12 RX ADMIN — Medication 1 TABLET(S): at 12:14

## 2019-08-12 RX ADMIN — Medication 975 MILLIGRAM(S): at 05:54

## 2019-08-12 RX ADMIN — Medication 100 MILLIGRAM(S): at 12:14

## 2019-08-12 RX ADMIN — Medication 50 MILLIGRAM(S): at 05:25

## 2019-08-12 RX ADMIN — Medication 100 GRAM(S): at 20:08

## 2019-08-12 RX ADMIN — Medication 500 MILLIGRAM(S): at 12:14

## 2019-08-12 RX ADMIN — HYDROMORPHONE HYDROCHLORIDE 2 MILLIGRAM(S): 2 INJECTION INTRAMUSCULAR; INTRAVENOUS; SUBCUTANEOUS at 06:43

## 2019-08-12 RX ADMIN — Medication 1: at 08:06

## 2019-08-12 RX ADMIN — DULOXETINE HYDROCHLORIDE 60 MILLIGRAM(S): 30 CAPSULE, DELAYED RELEASE ORAL at 05:26

## 2019-08-12 RX ADMIN — GABAPENTIN 600 MILLIGRAM(S): 400 CAPSULE ORAL at 23:37

## 2019-08-12 RX ADMIN — Medication 975 MILLIGRAM(S): at 23:33

## 2019-08-12 RX ADMIN — Medication 2: at 17:31

## 2019-08-12 RX ADMIN — HYDROMORPHONE HYDROCHLORIDE 4 MILLIGRAM(S): 2 INJECTION INTRAMUSCULAR; INTRAVENOUS; SUBCUTANEOUS at 17:15

## 2019-08-12 RX ADMIN — Medication 250 MILLIGRAM(S): at 05:25

## 2019-08-12 RX ADMIN — Medication 1 APPLICATION(S): at 15:02

## 2019-08-12 RX ADMIN — ENOXAPARIN SODIUM 40 MILLIGRAM(S): 100 INJECTION SUBCUTANEOUS at 12:14

## 2019-08-12 RX ADMIN — METHOCARBAMOL 750 MILLIGRAM(S): 500 TABLET, FILM COATED ORAL at 15:01

## 2019-08-12 RX ADMIN — HYDROMORPHONE HYDROCHLORIDE 4 MILLIGRAM(S): 2 INJECTION INTRAMUSCULAR; INTRAVENOUS; SUBCUTANEOUS at 12:14

## 2019-08-12 RX ADMIN — Medication 20 MILLIGRAM(S): at 05:28

## 2019-08-12 RX ADMIN — Medication 325 MILLIGRAM(S): at 05:25

## 2019-08-12 RX ADMIN — HYDROMORPHONE HYDROCHLORIDE 4 MILLIGRAM(S): 2 INJECTION INTRAMUSCULAR; INTRAVENOUS; SUBCUTANEOUS at 13:00

## 2019-08-12 RX ADMIN — GABAPENTIN 300 MILLIGRAM(S): 400 CAPSULE ORAL at 05:25

## 2019-08-12 NOTE — PROGRESS NOTE ADULT - ASSESSMENT
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 Pt admitted with severe sepsis secondary to wet gangrene of RLE, complicated by maggot infestation of wound. Vascular sx consulted and pt s/p wound cleansing and went to the OR for emergent source control and s/p  Guillotine R BKA. Sepsis resolved post sx. Empirically on vanco/ zosyn, Bcx negative. Hospital course complicated by acute on chronic anemia in the setting of AOCD, s/p 2 u prbc on admission and 1 u prbc intraop with appropriate response to hgb, remains stable thereafter. FOBT pending but no evidence of active bleeding. Also noted with BRANT/ hyponatremia likely due to dehydration, s/p ivf fluid resuscitation with improving renal function and sodium. Slow clinical improvement. ID, Podiatry, Vascular, PM and Psych consults noted. PT consult noted, recommends acute rehab. OT and PM&R consults noted. PM consulted noted, pain regimen adjusted s/p revision sx.     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  POD #5 s/p right AKA revision with proximal femoral nerve resection.  - currently with down trending leukocytosis to wnl   - elevated esr/ crp   - afebrile  and normotensive - B cx negative X 2  - completed Zosyn 3.375 g IV q8h D#11 on 8/11. S/p vanco. No further abx needed.   - pain control as per pain mx, no role for suboxone until off of acute narcotics   - c/w Dilaudid on a sliding scale, robaxin and gabapentin   - c/w Dilaudid 0.5mg BID PRN or severe/breakthrough pain   - c/w colace for prn constipation   - c/w local wound care as per vascular sx   - ID f/u noted and appreciated. Pt will c/w abx until 4 days s/p BKA revision - stop date 8/11  - vascular sx f/u  noted and appreciated.  POD #5 s/p right AKA revision with proximal femoral nerve resection. Amputee support team on board. Representatives from prosthetic devices were at bedside previously.  - Podiatry, pain management and psych also on board   -PT consult noted. Recommends acute rehab and also OT and PM&R consults.     Sinus tachycardia - at this time could also be due to pain   - Stable now. If persists will consult Cardiology. Repeat EKG sinus tachycardia. Lopressor 5mg IVP once for HR >120, to be given if tachycardia persists.  - Pt denies CP, SOB.  - TTE: Normal LVSF, EF 65-70; No right heart strain; Mild Pul HTN; mod enlarged LA  -c/w low dose bb     Depression  -Confirmed by PMD Dr. Pierce  -C/w Cymbalta 60mg BID; seen by psych, will re consult due to concerns for depression and also when ready to transition pt to suboxone.     BRANT - resolved  - likely secondary to dehydration; severe sepsis now resolved   -Improving renal fxn to wnl  -sodium now wnl  Tolerating PO intake    B/l groin intertriginous dermatitis  -C/w Nystatin powder bid  -Continue to monitor for signs of infection     Hypocalcemia  -corrected amount is 9.4, no further intervention   -S/p albumin replacement     Acute on Chronic Anemia   - noted AOCD, now stable   -Initial Hb 8.3, downtrended to 6.4   - likely bc pt was initially dehydrated   -s/p 2U PRBCs on admision and 1 u prbc intra op   - FOBT pending, no evidence of bleeding   - will monitor hgb and transfuse for hgb <7   -C/w iron supplement, - c/w vitamin c     IDDM-2 poorly controlled  -HbA1c 7  - c/w accuchecks ACHS TID  - pt with am sugars 170-190, to complete last abx in dextrose today.   - added 7 units lantus, c/w ISS  - will adjust accordingly    HTN/HLD  -C/w Metoprolol, Enalapril. BP slightly elevated likely due to pain   -c/w lipitor     H/o polysubstance use disorder - currently taking Suboxone  -Utox negative  -Last dose of Suboxone was day prior to hospitalization   -currently pt requiring narcotics for pain control   - labile emotions - psych reconsult for signs of depression  -Psych consult noted and appreciated, will reconsult when pt is off narcotics or defer to outpt management     Preventative Measures  DVT ppx: started Lovenox 40 - benefits outweigh risk. s/p BKA, immobile, sedentary, platelets stable, renal functions wnl.    Advanced Directive: Full code  Next of kin: Brother Jose    Dispo:  Likely d/c to [facility]. Pt eval noted. OT and PM&R consults noted, pt needs facility that accepts Suboxone use. 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 Pt admitted with severe sepsis secondary to wet gangrene of RLE, complicated by maggot infestation of wound. Vascular sx consulted and pt s/p wound cleansing and went to the OR for emergent source control and s/p  Guillotine R BKA. Sepsis resolved post sx. Empirically on vanco/ zosyn, Bcx negative. Hospital course complicated by acute on chronic anemia in the setting of AOCD, s/p 2 u prbc on admission and 1 u prbc intraop with appropriate response to hgb, remains stable thereafter. FOBT pending but no evidence of active bleeding. Also noted with BRANT/ hyponatremia likely due to dehydration, s/p ivf fluid resuscitation with improving renal function and sodium.ID, Podiatry, Vascular, PM and Psych consults noted. PT consult noted, recommends acute rehab. OT and PM&R consults noted. PM consulted noted, pain regimen adjusted s/p revision sx. s/p amputation, patient continues to have pain which is difficult to control.     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  POD #5 s/p right AKA revision with proximal femoral nerve resection.  -leucocytosis normalized    - elevated esr/ crp   - afebrile  and normotensive - B cx negative X 2  - completed Zosyn 3.375 g IV q8h D#11 on 8/11. S/p vanco. No further abx needed.   - pain control as per pain mx, no role for suboxone until off of acute narcotics   - c/w Dilaudid on a sliding scale, robaxin and gabapentin   - c/w Dilaudid 0.5mg IV daily PRN or severe/breakthrough pain added  -at the advice of vascular surgery, toradol ordered standing for 2 days   -pain management follow up requested   - ID f/u noted and appreciated  - vascular sx f/u  noted and appreciated.  POD #5 s/p right AKA revision with proximal femoral nerve resection. Amputee support team on board. Representatives from prosthetic devices were at bedside previously.  - Podiatry, pain management and psych also on board   -PT consult noted. Recommends acute rehab and also OT and PM&R consults.     Sinus tachycardia - at this time could also be due to pain   - Stable now. If persists will consult Cardiology. Repeat EKG sinus tachycardia. Lopressor 5mg IVP once for HR >120, to be given if tachycardia persists.  - Pt denies CP, SOB.  - TTE: Normal LVSF, EF 65-70; No right heart strain; Mild Pul HTN; mod enlarged LA  -c/w bb for now and pain control     Depression  -Confirmed by PMD Dr. Pierce  -C/w Cymbalta 60mg BID; seen by psych, will re consult due to concerns for depression and also when ready to transition pt to suboxone; psych f/up once ready for discharge     BRANT - resolved  Hypocaclemia   - likely secondary to dehydration; severe sepsis now resolved   -Improving renal fxn to wnl  -sodium now wnl  -corrected amount is 9.4, no further intervention   -S/p albumin replacement     Acute on Chronic Anemia   - noted AOCD, now stable   -Initial Hb 8.3, downtrended to 6.4   - likely bc pt was initially dehydrated   -s/p 2U PRBCs on admision and 1 u prbc intra op   - FOBT pending, no evidence of bleeding   - will monitor hgb and transfuse for hgb <7   -C/w iron supplement, - c/w vitamin c     IDDM-2 poorly controlled  -HbA1c 7  - c/w accuchecks ACHS TID  - pt with am sugars 170-190, to complete last abx in dextrose today.   - c/w 7 units lantus, c/w ISS  - will adjust accordingly    HTN/HLD  -C/w Metoprolol, Enalapril. BP slightly elevated likely due to pain . will repeat bp today and if elevated, would consider adding norvasc   -c/w lipitor     H/o polysubstance use disorder - currently taking Suboxone  -Utox negative  -Last dose of Suboxone was day prior to hospitalization; however, per ISTOP patient had not had subaxone since April - will check ISTOP  -currently pt requiring narcotics for pain control   - labile emotions - psych reconsult for signs of depression  -Psych consult noted and appreciated, will reconsult when pt is off narcotics or defer to outpt management     B/l groin intertriginous dermatitis  -C/w Nystatin powder bid  -Continue to monitor for signs of infection     Preventative Measures  DVT ppx: started Lovenox 40 - benefits outweigh risk. s/p BKA, immobile, sedentary, platelets stable, renal functions wnl.    Advanced Directive: Full code  Next of kin: Brother Jose    Dispo:  Likely d/c to [facility]. Pt eval noted. OT and PM&R consults noted, pt needs facility that accepts Suboxone use. 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 Pt admitted with severe sepsis secondary to wet gangrene of RLE, complicated by maggot infestation of wound. Vascular sx consulted and pt s/p wound cleansing and went to the OR for emergent source control and s/p  Guillotine R BKA. Sepsis resolved post sx. Empirically on vanco/ zosyn, Bcx negative. Hospital course complicated by acute on chronic anemia in the setting of AOCD, s/p 2 u prbc on admission and 1 u prbc intraop with appropriate response to hgb, remains stable thereafter. FOBT pending but no evidence of active bleeding. Also noted with BRANT/ hyponatremia likely due to dehydration, s/p ivf fluid resuscitation with improving renal function and sodium.ID, Podiatry, Vascular, PM and Psych consults noted. PT consult noted, recommends acute rehab. OT and PM&R consults noted. PM consulted noted, pain regimen adjusted s/p revision sx. s/p amputation, patient continues to have pain which is difficult to control.     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  POD #5 s/p right AKA revision with proximal femoral nerve resection.  -leucocytosis normalized    - elevated esr/ crp   - afebrile  and normotensive - B cx negative X 2  - completed Zosyn 3.375 g IV q8h D#11 on 8/11. S/p vanco. No further abx needed.   - pain control as per pain mx, no role for suboxone until off of acute narcotics   - c/w Dilaudid on a sliding scale, robaxin and gabapentin   - c/w Dilaudid 0.5mg IV daily PRN or severe/breakthrough pain added  -at the advice of vascular surgery, toradol ordered standing for 2 days   -pain management follow up requested.   - ID f/u noted and appreciated  - vascular sx f/u  noted and appreciated.  POD #5 s/p right AKA revision with proximal femoral nerve resection. Amputee support team on board. Representatives from prosthetic devices were at bedside previously.  - Podiatry, pain management and psych also on board   -PT consult noted. Recommends acute rehab and also OT and PM&R consults.     Sinus tachycardia - at this time could also be due to pain   - Stable now. If persists will consult Cardiology. Repeat EKG sinus tachycardia. Lopressor 5mg IVP once for HR >120, to be given if tachycardia persists.  - Pt denies CP, SOB.  - TTE: Normal LVSF, EF 65-70; No right heart strain; Mild Pul HTN; mod enlarged LA  -c/w bb for now and pain control     Hypomagnesemia   -S/p repletion  -F/u BMP am     Depression  -Confirmed by PMD Dr. Pierce  -C/w Cymbalta 60mg BID; seen by psych, will re consult due to concerns for depression and also when ready to transition pt to suboxone; psych f/up once ready for discharge     BRANT - resolved  Hypocaclemia   - likely secondary to dehydration; severe sepsis now resolved   -Improving renal fxn to wnl  -sodium now wnl  -corrected amount is 9.4, no further intervention   -S/p albumin replacement     Acute on Chronic Anemia   - noted AOCD, now stable   -Initial Hb 8.3, downtrended to 6.4   - likely bc pt was initially dehydrated   -s/p 2U PRBCs on admision and 1 u prbc intra op   - FOBT pending, no evidence of bleeding   - will monitor hgb and transfuse for hgb <7   -C/w iron supplement, - c/w vitamin c     IDDM-2 poorly controlled  -HbA1c 7  - c/w accuchecks ACHS TID  - pt with am sugars 170-190, to complete last abx in dextrose today.   - c/w 7 units lantus, c/w ISS  - will adjust accordingly    HTN/HLD  -C/w Metoprolol, Enalapril. BP slightly elevated likely due to pain . will repeat bp today and if elevated, would consider adding norvasc   -c/w lipitor     H/o polysubstance use disorder - currently taking Suboxone  -Utox negative  -Last dose of Suboxone was day prior to hospitalization; however, per ISTOP patient had not had subaxone since April - will check ISTOP  -currently pt requiring narcotics for pain control   - labile emotions - psych reconsult for signs of depression  -Psych consult noted and appreciated, will reconsult when pt is off narcotics or defer to outpt management     B/l groin intertriginous dermatitis  -C/w Nystatin powder bid  -Continue to monitor for signs of infection     Preventative Measures  DVT ppx: started Lovenox 40 - benefits outweigh risk. s/p BKA, immobile, sedentary, platelets stable, renal functions wnl.    Advanced Directive: Full code  Next of kin: Brother Jose    Dispo:  Likely d/c to [facility]. Pt eval noted. OT and PM&R consults noted, pt needs facility that accepts Suboxone use.

## 2019-08-12 NOTE — PROGRESS NOTE ADULT - SUBJECTIVE AND OBJECTIVE BOX
Patient is a 54y old  Male who presents with a chief complaint of sepsis, foot wound (12 Aug 2019 13:00)      VERBAL REPORT: "They redid my dressing, and that started a new pain."  Patient admits good coverage of his post-operative pain with current regimen. RN at bedside explains that patient repeatedly removes all dressings and garment, and they have to be replaced by staff. Of note, he has sat in the bed without clothing throughout the hospital stay.    PAIN SCORE: 4-5/10  (VNRS)    MEDICATIONS  (STANDING):  acetaminophen   Tablet .. 975 milliGRAM(s) Oral every 8 hours  ascorbic acid 500 milliGRAM(s) Oral daily  atorvastatin 40 milliGRAM(s) Oral at bedtime  dextrose 5%. 1000 milliLiter(s) (50 mL/Hr) IV Continuous <Continuous>  dextrose 50% Injectable 12.5 Gram(s) IV Push once  dextrose 50% Injectable 25 Gram(s) IV Push once  dextrose 50% Injectable 25 Gram(s) IV Push once  DULoxetine 60 milliGRAM(s) Oral two times a day  enalapril 20 milliGRAM(s) Oral daily  enoxaparin Injectable 40 milliGRAM(s) SubCutaneous daily  ferrous    sulfate 325 milliGRAM(s) Oral four times a day  folic acid 1 milliGRAM(s) Oral daily  gabapentin 600 milliGRAM(s) Oral three times a day  insulin glargine Injectable (LANTUS) 10 Unit(s) SubCutaneous at bedtime  insulin lispro (HumaLOG) corrective regimen sliding scale   SubCutaneous three times a day before meals  ketorolac   Injectable 15 milliGRAM(s) IV Push every 12 hours  methocarbamol 750 milliGRAM(s) Oral every 8 hours  metoprolol tartrate 50 milliGRAM(s) Oral two times a day  multivitamin 1 Tablet(s) Oral daily  pantoprazole    Tablet 40 milliGRAM(s) Oral before breakfast  saccharomyces boulardii 250 milliGRAM(s) Oral two times a day  silver sulfADIAZINE 1% Cream 1 Application(s) Topical daily  thiamine 100 milliGRAM(s) Oral daily    MEDICATIONS  (PRN):  aluminum hydroxide/magnesium hydroxide/simethicone Suspension 30 milliLiter(s) Oral every 6 hours PRN Dyspepsia  dextrose 40% Gel 15 Gram(s) Oral once PRN Blood Glucose LESS THAN 70 milliGRAM(s)/deciliter  glucagon  Injectable 1 milliGRAM(s) IntraMuscular once PRN Glucose LESS THAN 70 milligrams/deciliter  HYDROmorphone   Tablet 2 milliGRAM(s) Oral every 4 hours PRN Moderate Pain (4 - 6)  HYDROmorphone   Tablet 4 milliGRAM(s) Oral every 4 hours PRN Severe Pain (7 - 10)  HYDROmorphone  Injectable 0.5 milliGRAM(s) IV Push daily PRN break through pain  metoprolol tartrate IVPB 5 milliGRAM(s) IV Intermittent once PRN HR greater than 120  zolpidem 5 milliGRAM(s) Oral at bedtime PRN Insomnia      PHYSICAL EXAM:  A&Ox3, NAD, sitting up in bed having L foot redressed by RN. Presently no indication of intense pain or oversedation.      T(F): , Max: 98 (01:20)  HR:  (100 - 116)  BP:  (144/86 - 168/95)  RR:  (18 - 18)  SpO2:  (98% - 99%)      Pain Service signs off  Text page via [employer] [employer]  PIN: [phone]
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
## 2019-08-12 NOTE — PROGRESS NOTE ADULT - SUBJECTIVE AND OBJECTIVE BOX
Patient is a 54y old  Male who presents with a chief complaint of sepsis, foot wound (01 Aug 2019 13:18) s/p BKA controlling pain     Overnight AM events:  Patient seen and examined at beside. No acute events over night. POD #11 s/p Right BKA and POD #5 s/p right AKA revision with proximal femoral nerve resection. Pt reports pain has improved now 5/10 but he had severe pain ON. Pt cotinues to c/o of bed not being comfortable. Per nurse, engineering was called and bed was changed, however, pt still reports that the bed makes difficult for him to sleep. Was called by nurse around 8pm, stating that pt is crying b/c of severe pain, he was given Dilaudid IV for breakthrough. Pt is tolerating PO diet w/o n/v/diarrhea. Pt is voiding and last BM was yesterday.     ROS: Denies CP, HA, SOB, n/v/, fever/chills, abdominal/back pain, vision changes, numbness/tingling, blood in urine or stool. All other ROS negative     CAPILLARY BLOOD GLUCOSE  POCT  Blood Glucose: 176 mg/dL (08.12.19 @ 07:48)  POCT  Blood Glucose: 283 mg/dL (08.11.19 @ 22:38)  POCT  Blood Glucose: 292 mg/dL (08.11.19 @ 17:06)     Physical Examination   Vital Signs Last 24 Hrs  T(C): 36.7 (12 Aug 2019 05:30), Max: 36.7 (12 Aug 2019 01:20)  T(F): 98 (12 Aug 2019 05:30), Max: 98 (12 Aug 2019 01:20)  HR: 112 (12 Aug 2019 05:30) (100 - 116)  BP: 168/95 (12 Aug 2019 05:30) (144/86 - 168/95)  RR: 18 (12 Aug 2019 05:30) (18 - 18)  SpO2: 98% (12 Aug 2019 05:30) (98% - 99%)    GENERAL: Obese, WD WN, poor hygiene   HEENT: PERRLA, pupil constricted to light b/l, poor dentition   RESP: CTA b/l,, no crackles, no wheezing or rhonchi   CVS: RRR, Normal S1 & S2, No m/r/g  GI: soft +BS normoactive, NT, ND, b/l groin intertriginous dermatitis - no signs of infection   Extremities: Rt foot s/p R AKA, L foot - superficial ulcer 4x5cm, no active drainage, s/p partial amputation of foot clean dry no evidence of infection. Healing well.   Neuro: AAOX3, non focal   Skin: as above                           8.7    7.45  )-----------( 438      ( 11 Aug 2019 10:10 )             29.4     08-11    142  |  105  |  10.0  ----------------------------<  218<H>  3.2<L>   |  26.0  |  0.52    Ca    8.1<L>      11 Aug 2019 10:10  Phos  3.2     08-11  Mg     1.1     08-11    TPro  5.8<L>  /  Alb  2.4<L>  /  TBili  0.2<L>  /  DBili  x   /  AST  14  /  ALT  10  /  AlkPhos  285<H>  08-11    LIVER FUNCTIONS - ( 11 Aug 2019 10:10 )  Alb: 2.4 g/dL / Pro: 5.8 g/dL / ALK PHOS: 285 U/L / ALT: 10 U/L / AST: 14 U/L / GGT: x               IMAGING  < from: TTE Echo Complete w/Doppler (08.03.19 @ 13:18) >  Summary:   1. Technically limited study.   2. Normal left ventricular internal cavity size.   3. Normal global left ventricular systolic function.   4. Left ventricular ejection fraction, by visual estimation, is 65 to   70%.   5. The left ventricular diastolic function could not be assessed in this   study.   6. Normal right ventricular size and function.   7. Moderately enlarged left atrium.   8. Normal trileaflet aortic valve with normal opening.   9. Estimated pulmonary artery systolic pressure is 45.4 mmHg assuming a   right atrial pressure of 5 mmHg, which is consistent with mild pulmonary   hypertension.  10. There is no evidence of pericardial effusion.    < end of copied text >      < from: Xray Ankle Complete 3 Views, Right (07.31.19 @ 22:25) >  IMPRESSION:   Soft tissue swelling, ulcerations consistent with diffuse   osteomyelitis involving the phalanges and metatarsal bones tarsal bones   possibly ankle joint/show bilateral medial malleolus.    < end of copied text >    < from: Xray Foot AP + Lateral + Oblique, Right (07.31.19 @ 22:25) >  MPRESSION:   Soft tissue swelling, ulcerations consistent with diffuse   osteomyelitis involving the phalanges and metatarsal bones tarsal bones   possibly ankle joint/show bilateral medial malleolus.    < end of copied text >    < from: Xray Chest 1 View-PORTABLE IMMEDIATE (07.31.19 @ 20:04) >  Impression:  No evidence of acute cardiopulmonary disease. Allowing for differences in   technique, no significant change since 5/11/2016..    < end of copied text >      MEDICATIONS  (STANDING):  acetaminophen   Tablet .. 975 milliGRAM(s) Oral every 8 hours  ascorbic acid 500 milliGRAM(s) Oral daily  atorvastatin 40 milliGRAM(s) Oral at bedtime  dextrose 5%. 1000 milliLiter(s) (50 mL/Hr) IV Continuous <Continuous>  dextrose 50% Injectable 12.5 Gram(s) IV Push once  dextrose 50% Injectable 25 Gram(s) IV Push once  dextrose 50% Injectable 25 Gram(s) IV Push once  DULoxetine 60 milliGRAM(s) Oral two times a day  enalapril 20 milliGRAM(s) Oral daily  enoxaparin Injectable 40 milliGRAM(s) SubCutaneous daily  ferrous    sulfate 325 milliGRAM(s) Oral four times a day  folic acid 1 milliGRAM(s) Oral daily  gabapentin 300 milliGRAM(s) Oral three times a day  insulin glargine Injectable (LANTUS) 10 Unit(s) SubCutaneous at bedtime  insulin lispro (HumaLOG) corrective regimen sliding scale   SubCutaneous three times a day before meals  methocarbamol 750 milliGRAM(s) Oral every 8 hours  metoprolol tartrate 50 milliGRAM(s) Oral two times a day  multivitamin 1 Tablet(s) Oral daily  pantoprazole    Tablet 40 milliGRAM(s) Oral before breakfast  piperacillin/tazobactam IVPB.. 3.375 Gram(s) IV Intermittent every 8 hours  saccharomyces boulardii 250 milliGRAM(s) Oral two times a day  silver sulfADIAZINE 1% Cream 1 Application(s) Topical daily  thiamine 100 milliGRAM(s) Oral daily    MEDICATIONS  (PRN):  aluminum hydroxide/magnesium hydroxide/simethicone Suspension 30 milliLiter(s) Oral every 6 hours PRN Dyspepsia  dextrose 40% Gel 15 Gram(s) Oral once PRN Blood Glucose LESS THAN 70 milliGRAM(s)/deciliter  glucagon  Injectable 1 milliGRAM(s) IntraMuscular once PRN Glucose LESS THAN 70 milligrams/deciliter  HYDROmorphone   Tablet 2 milliGRAM(s) Oral every 4 hours PRN Moderate Pain (4 - 6)  HYDROmorphone   Tablet 4 milliGRAM(s) Oral every 4 hours PRN Severe Pain (7 - 10)  HYDROmorphone  Injectable 0.5 milliGRAM(s) IV Push two times a day PRN Severe Pain (7 - 10)  metoprolol tartrate IVPB 5 milliGRAM(s) IV Intermittent once PRN HR greater than 120  zolpidem 5 milliGRAM(s) Oral at bedtime PRN Insomnia Patient is a 54y old  Male who presents with a chief complaint of sepsis, foot wound (01 Aug 2019 13:18) s/p BKA controlling pain     Overnight AM events:  Patient seen and examined at beside. No acute events over night. POD #11 s/p Right BKA and POD #5 s/p right AKA revision with proximal femoral nerve resection. Pt reports pain has improved now 5/10 but he had severe pain ON. Pt cotinues to c/o of bed not being comfortable. Per nurse, engineering was called and bed was changed, however, pt still reports that the bed makes difficult for him to sleep. Was called by nurse around 8pm, stating that pt is crying b/c of severe pain, he was given Dilaudid IV for breakthrough. Pt is tolerating PO diet w/o n/v/diarrhea. Pt is voiding and last BM was yesterday.     ROS: Denies CP, HA, SOB, n/v/, fever/chills, abdominal/back pain, vision changes, numbness/tingling, blood in urine or stool. All other ROS negative     CAPILLARY BLOOD GLUCOSE  POCT  Blood Glucose: 176 mg/dL (08.12.19 @ 07:48)  POCT  Blood Glucose: 283 mg/dL (08.11.19 @ 22:38)  POCT  Blood Glucose: 292 mg/dL (08.11.19 @ 17:06)     Physical Examination   Vital Signs Last 24 Hrs  T(C): 36.7 (12 Aug 2019 05:30), Max: 36.7 (12 Aug 2019 01:20)  T(F): 98 (12 Aug 2019 05:30), Max: 98 (12 Aug 2019 01:20)  HR: 112 (12 Aug 2019 05:30) (100 - 116)  BP: 168/95 (12 Aug 2019 05:30) (144/86 - 168/95)  RR: 18 (12 Aug 2019 05:30) (18 - 18)  SpO2: 98% (12 Aug 2019 05:30) (98% - 99%)    GENERAL: Obese, Resting in bed, poor hygiene   HEENT: PERRLA, pupil constricted to light b/l, poor dentition   RESP: CTA b/l, no crackles, no wheezing or rhonchi   CVS: RRR, Normal S1 & S2, No m/r/g  GI: soft +BS normoactive, NT, ND, b/l groin intertriginous dermatitis - no signs of infection   Extremities: Rt foot s/p R AKA, L foot - superficial ulcer 4x5cm, no active drainage, s/p partial amputation of foot clean dry no evidence of infection. Healing well.   Neuro: AAOX3, non focal   Skin: Groin irritation noted, decreased erythema present b/l     Labs:                         8.9    9.91  )-----------( 544      ( 12 Aug 2019 08:41 )             29.9   08-12    147<H>  |  106  |  10.0  ----------------------------<  170<H>  4.0   |  29.0  |  0.44<L>    Ca    8.4<L>      12 Aug 2019 08:41  Phos  3.6     08-12  Mg     1.4     08-12    TPro  6.4<L>  /  Alb  3.1<L>  /  TBili  0.3<L>  /  DBili  x   /  AST  15  /  ALT  12  /  AlkPhos  275<H>  08-12      IMAGING  < from: TTE Echo Complete w/Doppler (08.03.19 @ 13:18) >  Summary:   1. Technically limited study.   2. Normal left ventricular internal cavity size.   3. Normal global left ventricular systolic function.   4. Left ventricular ejection fraction, by visual estimation, is 65 to   70%.   5. The left ventricular diastolic function could not be assessed in this   study.   6. Normal right ventricular size and function.   7. Moderately enlarged left atrium.   8. Normal trileaflet aortic valve with normal opening.   9. Estimated pulmonary artery systolic pressure is 45.4 mmHg assuming a   right atrial pressure of 5 mmHg, which is consistent with mild pulmonary   hypertension.  10. There is no evidence of pericardial effusion.    < end of copied text >      < from: Xray Ankle Complete 3 Views, Right (07.31.19 @ 22:25) >  IMPRESSION:   Soft tissue swelling, ulcerations consistent with diffuse   osteomyelitis involving the phalanges and metatarsal bones tarsal bones   possibly ankle joint/show bilateral medial malleolus.    < end of copied text >    < from: Xray Foot AP + Lateral + Oblique, Right (07.31.19 @ 22:25) >  MPRESSION:   Soft tissue swelling, ulcerations consistent with diffuse   osteomyelitis involving the phalanges and metatarsal bones tarsal bones   possibly ankle joint/show bilateral medial malleolus.    < end of copied text >    < from: Xray Chest 1 View-PORTABLE IMMEDIATE (07.31.19 @ 20:04) >  Impression:  No evidence of acute cardiopulmonary disease. Allowing for differences in   technique, no significant change since 5/11/2016..    < end of copied text >      MEDICATIONS  (STANDING):  acetaminophen   Tablet .. 975 milliGRAM(s) Oral every 8 hours  ascorbic acid 500 milliGRAM(s) Oral daily  atorvastatin 40 milliGRAM(s) Oral at bedtime  dextrose 5%. 1000 milliLiter(s) (50 mL/Hr) IV Continuous <Continuous>  dextrose 50% Injectable 12.5 Gram(s) IV Push once  dextrose 50% Injectable 25 Gram(s) IV Push once  dextrose 50% Injectable 25 Gram(s) IV Push once  DULoxetine 60 milliGRAM(s) Oral two times a day  enalapril 20 milliGRAM(s) Oral daily  enoxaparin Injectable 40 milliGRAM(s) SubCutaneous daily  ferrous    sulfate 325 milliGRAM(s) Oral four times a day  folic acid 1 milliGRAM(s) Oral daily  gabapentin 600 milliGRAM(s) Oral three times a day  insulin glargine Injectable (LANTUS) 10 Unit(s) SubCutaneous at bedtime  insulin lispro (HumaLOG) corrective regimen sliding scale   SubCutaneous three times a day before meals  ketorolac   Injectable 15 milliGRAM(s) IV Push every 12 hours  lactated ringers. 1000 milliLiter(s) (100 mL/Hr) IV Continuous <Continuous>  methocarbamol 750 milliGRAM(s) Oral every 8 hours  metoprolol tartrate 50 milliGRAM(s) Oral two times a day  multivitamin 1 Tablet(s) Oral daily  pantoprazole    Tablet 40 milliGRAM(s) Oral before breakfast  saccharomyces boulardii 250 milliGRAM(s) Oral two times a day  silver sulfADIAZINE 1% Cream 1 Application(s) Topical daily  thiamine 100 milliGRAM(s) Oral daily    MEDICATIONS  (PRN):  aluminum hydroxide/magnesium hydroxide/simethicone Suspension 30 milliLiter(s) Oral every 6 hours PRN Dyspepsia  dextrose 40% Gel 15 Gram(s) Oral once PRN Blood Glucose LESS THAN 70 milliGRAM(s)/deciliter  glucagon  Injectable 1 milliGRAM(s) IntraMuscular once PRN Glucose LESS THAN 70 milligrams/deciliter  HYDROmorphone   Tablet 2 milliGRAM(s) Oral every 4 hours PRN Moderate Pain (4 - 6)  HYDROmorphone   Tablet 4 milliGRAM(s) Oral every 4 hours PRN Severe Pain (7 - 10)  HYDROmorphone  Injectable 0.5 milliGRAM(s) IV Push daily PRN break through pain  metoprolol tartrate IVPB 5 milliGRAM(s) IV Intermittent once PRN HR greater than 120  zolpidem 5 milliGRAM(s) Oral at bedtime PRN Insomnia

## 2019-08-12 NOTE — PROGRESS NOTE ADULT - SUBJECTIVE AND OBJECTIVE BOX
INTERVAL HPI/OVERNIGHT EVENTS: NAOE; PM&R continues to recommend FÉLIX post-discharge from this acute hospital setting     SUBJECTIVE: Mr. Weller w/ complaints of pain despite current regimen; requests home Suboxone - expresses some worry that he is on his own at home after discharge       MEDICATIONS  (STANDING):  acetaminophen   Tablet .. 975 milliGRAM(s) Oral every 8 hours  ascorbic acid 500 milliGRAM(s) Oral daily  atorvastatin 40 milliGRAM(s) Oral at bedtime  dextrose 5%. 1000 milliLiter(s) (50 mL/Hr) IV Continuous <Continuous>  dextrose 50% Injectable 12.5 Gram(s) IV Push once  dextrose 50% Injectable 25 Gram(s) IV Push once  dextrose 50% Injectable 25 Gram(s) IV Push once  DULoxetine 60 milliGRAM(s) Oral two times a day  enalapril 20 milliGRAM(s) Oral daily  enoxaparin Injectable 40 milliGRAM(s) SubCutaneous daily  ferrous    sulfate 325 milliGRAM(s) Oral four times a day  folic acid 1 milliGRAM(s) Oral daily  gabapentin 600 milliGRAM(s) Oral three times a day  insulin glargine Injectable (LANTUS) 10 Unit(s) SubCutaneous at bedtime  insulin lispro (HumaLOG) corrective regimen sliding scale   SubCutaneous three times a day before meals  ketorolac   Injectable 15 milliGRAM(s) IV Push every 12 hours  lactated ringers. 1000 milliLiter(s) (100 mL/Hr) IV Continuous <Continuous>  methocarbamol 750 milliGRAM(s) Oral every 8 hours  metoprolol tartrate 50 milliGRAM(s) Oral two times a day  multivitamin 1 Tablet(s) Oral daily  pantoprazole    Tablet 40 milliGRAM(s) Oral before breakfast  saccharomyces boulardii 250 milliGRAM(s) Oral two times a day  silver sulfADIAZINE 1% Cream 1 Application(s) Topical daily  thiamine 100 milliGRAM(s) Oral daily    MEDICATIONS  (PRN):  aluminum hydroxide/magnesium hydroxide/simethicone Suspension 30 milliLiter(s) Oral every 6 hours PRN Dyspepsia  dextrose 40% Gel 15 Gram(s) Oral once PRN Blood Glucose LESS THAN 70 milliGRAM(s)/deciliter  glucagon  Injectable 1 milliGRAM(s) IntraMuscular once PRN Glucose LESS THAN 70 milligrams/deciliter  HYDROmorphone   Tablet 2 milliGRAM(s) Oral every 4 hours PRN Moderate Pain (4 - 6)  HYDROmorphone   Tablet 4 milliGRAM(s) Oral every 4 hours PRN Severe Pain (7 - 10)  HYDROmorphone  Injectable 0.5 milliGRAM(s) IV Push daily PRN break through pain  metoprolol tartrate IVPB 5 milliGRAM(s) IV Intermittent once PRN HR greater than 120  zolpidem 5 milliGRAM(s) Oral at bedtime PRN Insomnia      Vital Signs Last 24 Hrs  T(C): 36.4 (12 Aug 2019 08:11), Max: 36.7 (12 Aug 2019 01:20)  T(F): 97.6 (12 Aug 2019 08:11), Max: 98 (12 Aug 2019 01:20)  HR: 101 (12 Aug 2019 08:11) (100 - 116)  BP: 165/103 (12 Aug 2019 08:11) (144/86 - 168/95)  RR: 18 (12 Aug 2019 08:11) (18 - 18)  SpO2: 98% (12 Aug 2019 08:11) (98% - 99%)    PE  Constitutional: patient laying in bed, in no acute distress, pleasant and conversant  HEENT: EOMI, no active drainage or redness  Neck: No JVD, full ROM without pain  Respiratory: respirations are unlabored, no accessory muscle use, no conversational dyspnea  Cardiovascular: tachycardic to 110, regular heart rhythm  Ext: Right AKA incision clean, dry, without drainage, and non-erythematous. Staples in place. Dressing and ACE bandage for coverage. Right thigh is soft and compressible. Left TMA with dressing in place.      I&O's Detail    11 Aug 2019 07:01  -  12 Aug 2019 07:00  --------------------------------------------------------  IN:  Total IN: 0 mL    OUT:    Voided: 775 mL  Total OUT: 775 mL    Total NET: -775 mL          LABS:                        8.9    9.91  )-----------( 544      ( 12 Aug 2019 08:41 )             29.9     08-12    147<H>  |  106  |  10.0  ----------------------------<  170<H>  4.0   |  29.0  |  0.44<L>    Ca    8.4<L>      12 Aug 2019 08:41  Phos  3.6     08-12  Mg     1.4     08-12    TPro  6.4<L>  /  Alb  3.1<L>  /  TBili  0.3<L>  /  DBili  x   /  AST  15  /  ALT  12  /  AlkPhos  275<H>  08-12

## 2019-08-12 NOTE — PROGRESS NOTE ADULT - ASSESSMENT
53yo M s/p right AKA with proximal femoral nerve resection; stable this morning. Patient's pain was worsens during dressing change, but otherwise controlled. Patient feels that restarting his home Suboxone would aid pain control significantly.    - Daily AKA dressing changes (xeroform, ABD, and ACE wrap)  - ID recs   - piperacillin/tazobactam to d/c today, 8/11  - DVT ppx  - PMNR currently recommends FÉLIX upon discharge  - pain per Pain management, possibly restart Suboxone?  - DM management  - incentive spirometry    - Dispo per primary team

## 2019-08-12 NOTE — PROGRESS NOTE ADULT - ASSESSMENT
54M hx HTN, HLD, DM2, ETOH abuse, IVDU (on suboxone), multiple toe/foot amputations presents with RLE foot ulcer and pain x 5 months.  In ED temp of 99.7, heart rate 150 (improved to 126 s/p ivf bolus), bp 86/54 (improved to 97/54), RR- 22 saturating 95% on room air.   Labs initially WBC 20, Hb 8, Na 123, K 7, Cr 2    Overall, severe sepsis secondary to wet gangrene, maggot infestation of wound, s/p BRANT, leukocytosis, anemia, uncontrolled diabetes  s/p Right BKA 8/1/19 . Afebrile, BP improved, leukocytosis and BRANT resolved.    s/p right AKA 8/7/19  - surgical cultures negative as expected  off antibiotics      Will monitor off antibiotics    Continue Wound care      Will sign off

## 2019-08-12 NOTE — PROGRESS NOTE ADULT - ASSESSMENT
53 y/o M, s/p right AKA. Hx: opioid and ETOH abuse, previously on Suboxone. Last picked up 04/17/19. He was found to be comfortable, sitting up in bed, NAD. Discussed transition to an oral regimen, with return to Suboxone prior to facility discharge. Patient verbalizes understanding. However, he wants to continue IV opioids for now.

## 2019-08-12 NOTE — PROGRESS NOTE ADULT - ATTENDING COMMENTS
patient awaiting CTA to r/o PE prior to clearance for sx   HR little better
Note addended where needed. Plan discussed with patient at bedside.
Note addended where needed. Plan discussed with patient at bedside. Will likely need to reconsult pain management and psych for placement with ? subaxone  Plan discussed with family (brother and sister at bedside)
Note addended where needed. Plan discussed with patient at bedside. 'family came to visit' during our exam however he wanted to have only him spoken to and family not informed of details of care.
Note addended where needed
note addended where needed

## 2019-08-12 NOTE — PROGRESS NOTE ADULT - SUBJECTIVE AND OBJECTIVE BOX
[facility]  INFECTIOUS DISEASES AND INTERNAL MEDICINE at [place]  =======================================================  Rupesh Santa MD  Diplomates American Board of Internal Medicine and Infectious Diseases  Tel: [phone]      Fax: [phone]  =======================================================    BREANNA MENDOZA [id number]    Follow up: maggot infestation of wound; RIGHT BKA stump infection  s/p AKA on 8/7/19  no significant pain  Cultures remain negative      Allergies:  Allergy Status Unknown      Antibiotics:  NONE      REVIEW OF SYSTEMS:  as above    Physical Exam:  T(F): 97.6 (12 Aug 2019 08:11), Max: 98 (12 Aug 2019 01:20)  HR: 101 (12 Aug 2019 08:11)  BP: 165/103 (12 Aug 2019 08:11)  RR: 18 (12 Aug 2019 08:11)  SpO2: 98% (12 Aug 2019 08:11) (98% - 99%)  temp max in last 48H T(F): , Max: 98.1 (08-11-19 @ 01:13)    GEN: NAD, pleasant  HEENT: normocephalic and atraumatic. EOMI. PERRL.  Anicteric   NECK: Supple.   LUNGS: Clear to auscultation.  HEART: Regular rate and rhythm  ABDOMEN: Soft, nontender, and nondistended.  Positive bowel sounds.    : No CVA tenderness  EXTREMITIES: Without any edema.  MSK: no joint swelling  NEUROLOGIC: No focal deficits  PSYCHIATRIC: Appropriate affect .  SKIN:  right AKA STUMP -SURGICAL INCISION CLOSED WITH SUTURES,. NO DRAINAGE., INTACT  left tma STUMP INTACT; SHALLOW ULCER AT BASE STUMP - CLEAN BASE    ===========  Labs:                        8.9    9.91  )-----------( 544       12 Aug 2019 08:41 )             29.9       WBC Count: 9.91 K/uL (08-12-19 @ 08:41)  WBC Count: 7.45 K/uL (08-11-19 @ 10:10)  WBC Count: 7.52 K/uL (08-10-19 @ 08:20)  WBC Count: 7.86 K/uL (08-09-19 @ 07:52)  WBC Count: 10.41 K/uL (08-08-19 @ 07:33)      08-12    147<H>  |  106  |  10.0  ----------------------------<  170<H>  4.0   |  29.0  |  0.44<L>    Ca    8.4<L>      12 Aug 2019 08:41  Phos  3.6     08-12  Mg     1.4     08-12    TPro  6.4<L>  /  Alb  3.1<L>  /  TBili  0.3<L>  /  DBili  x   /  AST  15  /  ALT  12  /  AlkPhos  275<H>  08-12      Culture - Urine (collected 08-01-19 @ 05:26)  Source: .Urine  Final Report (08-02-19 @ 10:09):    No growth    Culture - Blood (collected 07-31-19 @ 19:34)  Source: .Blood  Final Report (08-05-19 @ 21:00):    No growth at 5 days.    Culture - Blood (collected 07-31-19 @ 19:34)  Source: .Blood  Final Report (08-05-19 @ 21:00):    No growth at 5 days.

## 2019-08-12 NOTE — PROGRESS NOTE ADULT - NSHPATTENDINGPLANDISCUSS_GEN_ALL_CORE
Dr Mckinley
Dr Mckinley
Dr Mckinley and Dr Allen
medical team
BRADEN Parsons, DO
patient , resident team, rn,
patient, resident team, rn
patient, resident team, rn, cm
patient, resident team, rn, vascular sx
patient, resident team, rn
patient, resident team, rn, cm , ID
BRADEN Parsons, DO
patient, resident team, rn, ID,
BRADEN Parsons, DO
BRADEN Parsons, DO
patient PGY-2- RN katie sx ID
patient rn pgy-[id number] vascular sx ID podiatry

## 2019-08-13 ENCOUNTER — TRANSCRIPTION ENCOUNTER (OUTPATIENT)
Age: 54
End: 2019-08-13

## 2019-08-13 VITALS
RESPIRATION RATE: 20 BRPM | HEART RATE: 108 BPM | TEMPERATURE: 98 F | SYSTOLIC BLOOD PRESSURE: 148 MMHG | DIASTOLIC BLOOD PRESSURE: 88 MMHG

## 2019-08-13 LAB
ALBUMIN SERPL ELPH-MCNC: 2.9 G/DL — LOW (ref 3.3–5.2)
ALP SERPL-CCNC: 251 U/L — HIGH (ref 40–120)
ALT FLD-CCNC: 12 U/L — SIGNIFICANT CHANGE UP
ANION GAP SERPL CALC-SCNC: 13 MMOL/L — SIGNIFICANT CHANGE UP (ref 5–17)
AST SERPL-CCNC: 15 U/L — SIGNIFICANT CHANGE UP
BASOPHILS # BLD AUTO: 0.12 K/UL — SIGNIFICANT CHANGE UP (ref 0–0.2)
BASOPHILS NFR BLD AUTO: 1.4 % — SIGNIFICANT CHANGE UP (ref 0–2)
BILIRUB SERPL-MCNC: 0.2 MG/DL — LOW (ref 0.4–2)
BUN SERPL-MCNC: 9 MG/DL — SIGNIFICANT CHANGE UP (ref 8–20)
CALCIUM SERPL-MCNC: 8.6 MG/DL — SIGNIFICANT CHANGE UP (ref 8.6–10.2)
CHLORIDE SERPL-SCNC: 101 MMOL/L — SIGNIFICANT CHANGE UP (ref 98–107)
CO2 SERPL-SCNC: 27 MMOL/L — SIGNIFICANT CHANGE UP (ref 22–29)
CREAT SERPL-MCNC: 0.46 MG/DL — LOW (ref 0.5–1.3)
EOSINOPHIL # BLD AUTO: 0.33 K/UL — SIGNIFICANT CHANGE UP (ref 0–0.5)
EOSINOPHIL NFR BLD AUTO: 3.7 % — SIGNIFICANT CHANGE UP (ref 0–6)
GLUCOSE BLDC GLUCOMTR-MCNC: 207 MG/DL — HIGH (ref 70–99)
GLUCOSE SERPL-MCNC: 227 MG/DL — HIGH (ref 70–115)
HCT VFR BLD CALC: 31.1 % — LOW (ref 39–50)
HGB BLD-MCNC: 9.2 G/DL — LOW (ref 13–17)
IMM GRANULOCYTES NFR BLD AUTO: 0.6 % — SIGNIFICANT CHANGE UP (ref 0–1.5)
LYMPHOCYTES # BLD AUTO: 1.04 K/UL — SIGNIFICANT CHANGE UP (ref 1–3.3)
LYMPHOCYTES # BLD AUTO: 11.8 % — LOW (ref 13–44)
MAGNESIUM SERPL-MCNC: 1.4 MG/DL — LOW (ref 1.6–2.6)
MCHC RBC-ENTMCNC: 26.3 PG — LOW (ref 27–34)
MCHC RBC-ENTMCNC: 29.6 GM/DL — LOW (ref 32–36)
MCV RBC AUTO: 88.9 FL — SIGNIFICANT CHANGE UP (ref 80–100)
MONOCYTES # BLD AUTO: 0.73 K/UL — SIGNIFICANT CHANGE UP (ref 0–0.9)
MONOCYTES NFR BLD AUTO: 8.3 % — SIGNIFICANT CHANGE UP (ref 2–14)
NEUTROPHILS # BLD AUTO: 6.55 K/UL — SIGNIFICANT CHANGE UP (ref 1.8–7.4)
NEUTROPHILS NFR BLD AUTO: 74.2 % — SIGNIFICANT CHANGE UP (ref 43–77)
PHOSPHATE SERPL-MCNC: 4.3 MG/DL — SIGNIFICANT CHANGE UP (ref 2.4–4.7)
PLATELET # BLD AUTO: 523 K/UL — HIGH (ref 150–400)
POTASSIUM SERPL-MCNC: 3.4 MMOL/L — LOW (ref 3.5–5.3)
POTASSIUM SERPL-SCNC: 3.4 MMOL/L — LOW (ref 3.5–5.3)
PROT SERPL-MCNC: 6.6 G/DL — SIGNIFICANT CHANGE UP (ref 6.6–8.7)
RBC # BLD: 3.5 M/UL — LOW (ref 4.2–5.8)
RBC # FLD: 18.7 % — HIGH (ref 10.3–14.5)
SODIUM SERPL-SCNC: 141 MMOL/L — SIGNIFICANT CHANGE UP (ref 135–145)
WBC # BLD: 8.82 K/UL — SIGNIFICANT CHANGE UP (ref 3.8–10.5)
WBC # FLD AUTO: 8.82 K/UL — SIGNIFICANT CHANGE UP (ref 3.8–10.5)

## 2019-08-13 PROCEDURE — 99232 SBSQ HOSP IP/OBS MODERATE 35: CPT

## 2019-08-13 PROCEDURE — 99239 HOSP IP/OBS DSCHRG MGMT >30: CPT

## 2019-08-13 RX ORDER — ACETAMINOPHEN 500 MG
3 TABLET ORAL
Qty: 0 | Refills: 0 | DISCHARGE
Start: 2019-08-13

## 2019-08-13 RX ORDER — GABAPENTIN 400 MG/1
800 CAPSULE ORAL THREE TIMES A DAY
Refills: 0 | Status: DISCONTINUED | OUTPATIENT
Start: 2019-08-13 | End: 2019-08-13

## 2019-08-13 RX ORDER — HYDROMORPHONE HYDROCHLORIDE 2 MG/ML
0.5 INJECTION INTRAMUSCULAR; INTRAVENOUS; SUBCUTANEOUS ONCE
Refills: 0 | Status: DISCONTINUED | OUTPATIENT
Start: 2019-08-13 | End: 2019-08-13

## 2019-08-13 RX ORDER — MAGNESIUM SULFATE 500 MG/ML
1 VIAL (ML) INJECTION ONCE
Refills: 0 | Status: COMPLETED | OUTPATIENT
Start: 2019-08-13 | End: 2019-08-13

## 2019-08-13 RX ORDER — INSULIN LISPRO 100/ML
0 VIAL (ML) SUBCUTANEOUS
Qty: 0 | Refills: 0 | DISCHARGE

## 2019-08-13 RX ORDER — METHOCARBAMOL 500 MG/1
1 TABLET, FILM COATED ORAL
Qty: 0 | Refills: 0 | DISCHARGE
Start: 2019-08-13

## 2019-08-13 RX ORDER — INSULIN GLARGINE 100 [IU]/ML
15 INJECTION, SOLUTION SUBCUTANEOUS
Qty: 0 | Refills: 0 | DISCHARGE

## 2019-08-13 RX ORDER — METOPROLOL TARTRATE 50 MG
1 TABLET ORAL
Qty: 0 | Refills: 0 | DISCHARGE
Start: 2019-08-13

## 2019-08-13 RX ORDER — HYDROMORPHONE HYDROCHLORIDE 2 MG/ML
1 INJECTION INTRAMUSCULAR; INTRAVENOUS; SUBCUTANEOUS
Qty: 0 | Refills: 0 | DISCHARGE
Start: 2019-08-13

## 2019-08-13 RX ORDER — POTASSIUM CHLORIDE 20 MEQ
40 PACKET (EA) ORAL ONCE
Refills: 0 | Status: COMPLETED | OUTPATIENT
Start: 2019-08-13 | End: 2019-08-13

## 2019-08-13 RX ORDER — AMLODIPINE BESYLATE 2.5 MG/1
1 TABLET ORAL
Qty: 0 | Refills: 0 | DISCHARGE
Start: 2019-08-13

## 2019-08-13 RX ORDER — ASCORBIC ACID 60 MG
1 TABLET,CHEWABLE ORAL
Qty: 0 | Refills: 0 | DISCHARGE

## 2019-08-13 RX ORDER — FERROUS SULFATE 325(65) MG
1 TABLET ORAL
Qty: 0 | Refills: 0 | DISCHARGE

## 2019-08-13 RX ORDER — GABAPENTIN 400 MG/1
2 CAPSULE ORAL
Qty: 0 | Refills: 0 | DISCHARGE
Start: 2019-08-13

## 2019-08-13 RX ORDER — INSULIN GLARGINE 100 [IU]/ML
26 INJECTION, SOLUTION SUBCUTANEOUS
Qty: 0 | Refills: 0 | DISCHARGE

## 2019-08-13 RX ORDER — HYDRALAZINE HCL 50 MG
10 TABLET ORAL ONCE
Refills: 0 | Status: COMPLETED | OUTPATIENT
Start: 2019-08-13 | End: 2019-08-13

## 2019-08-13 RX ORDER — AMLODIPINE BESYLATE 2.5 MG/1
5 TABLET ORAL DAILY
Refills: 0 | Status: DISCONTINUED | OUTPATIENT
Start: 2019-08-13 | End: 2019-08-13

## 2019-08-13 RX ADMIN — Medication 975 MILLIGRAM(S): at 05:28

## 2019-08-13 RX ADMIN — GABAPENTIN 600 MILLIGRAM(S): 400 CAPSULE ORAL at 05:30

## 2019-08-13 RX ADMIN — GABAPENTIN 800 MILLIGRAM(S): 400 CAPSULE ORAL at 14:51

## 2019-08-13 RX ADMIN — Medication 975 MILLIGRAM(S): at 05:58

## 2019-08-13 RX ADMIN — Medication 250 MILLIGRAM(S): at 05:30

## 2019-08-13 RX ADMIN — Medication 325 MILLIGRAM(S): at 05:29

## 2019-08-13 RX ADMIN — Medication 325 MILLIGRAM(S): at 10:46

## 2019-08-13 RX ADMIN — ENOXAPARIN SODIUM 40 MILLIGRAM(S): 100 INJECTION SUBCUTANEOUS at 10:45

## 2019-08-13 RX ADMIN — Medication 500 MILLIGRAM(S): at 10:46

## 2019-08-13 RX ADMIN — Medication 975 MILLIGRAM(S): at 14:51

## 2019-08-13 RX ADMIN — HYDROMORPHONE HYDROCHLORIDE 0.5 MILLIGRAM(S): 2 INJECTION INTRAMUSCULAR; INTRAVENOUS; SUBCUTANEOUS at 08:04

## 2019-08-13 RX ADMIN — Medication 2: at 12:47

## 2019-08-13 RX ADMIN — Medication 10 MILLIGRAM(S): at 01:15

## 2019-08-13 RX ADMIN — Medication 20 MILLIGRAM(S): at 05:29

## 2019-08-13 RX ADMIN — METHOCARBAMOL 750 MILLIGRAM(S): 500 TABLET, FILM COATED ORAL at 14:50

## 2019-08-13 RX ADMIN — HYDROMORPHONE HYDROCHLORIDE 0.5 MILLIGRAM(S): 2 INJECTION INTRAMUSCULAR; INTRAVENOUS; SUBCUTANEOUS at 08:25

## 2019-08-13 RX ADMIN — Medication 100 MILLIGRAM(S): at 10:46

## 2019-08-13 RX ADMIN — Medication 100 GRAM(S): at 10:44

## 2019-08-13 RX ADMIN — HYDROMORPHONE HYDROCHLORIDE 4 MILLIGRAM(S): 2 INJECTION INTRAMUSCULAR; INTRAVENOUS; SUBCUTANEOUS at 00:07

## 2019-08-13 RX ADMIN — HYDROMORPHONE HYDROCHLORIDE 4 MILLIGRAM(S): 2 INJECTION INTRAMUSCULAR; INTRAVENOUS; SUBCUTANEOUS at 10:44

## 2019-08-13 RX ADMIN — Medication 15 MILLIGRAM(S): at 05:30

## 2019-08-13 RX ADMIN — Medication 15 MILLIGRAM(S): at 06:00

## 2019-08-13 RX ADMIN — PANTOPRAZOLE SODIUM 40 MILLIGRAM(S): 20 TABLET, DELAYED RELEASE ORAL at 05:45

## 2019-08-13 RX ADMIN — Medication 50 MILLIGRAM(S): at 05:29

## 2019-08-13 RX ADMIN — Medication 1 TABLET(S): at 10:45

## 2019-08-13 RX ADMIN — DULOXETINE HYDROCHLORIDE 60 MILLIGRAM(S): 30 CAPSULE, DELAYED RELEASE ORAL at 05:29

## 2019-08-13 RX ADMIN — HYDROMORPHONE HYDROCHLORIDE 0.5 MILLIGRAM(S): 2 INJECTION INTRAMUSCULAR; INTRAVENOUS; SUBCUTANEOUS at 14:51

## 2019-08-13 RX ADMIN — Medication 1 APPLICATION(S): at 10:47

## 2019-08-13 RX ADMIN — HYDROMORPHONE HYDROCHLORIDE 4 MILLIGRAM(S): 2 INJECTION INTRAMUSCULAR; INTRAVENOUS; SUBCUTANEOUS at 11:29

## 2019-08-13 RX ADMIN — METHOCARBAMOL 750 MILLIGRAM(S): 500 TABLET, FILM COATED ORAL at 05:45

## 2019-08-13 RX ADMIN — Medication 1 MILLIGRAM(S): at 10:46

## 2019-08-13 RX ADMIN — Medication 40 MILLIEQUIVALENT(S): at 10:46

## 2019-08-13 RX ADMIN — Medication 2: at 08:06

## 2019-08-13 NOTE — DISCHARGE NOTE PROVIDER - CARE PROVIDERS DIRECT ADDRESSES
,[email or web address],DirectAddress_Unknown,DirectAddress_Unknown,DirectAddress_Unknown

## 2019-08-13 NOTE — PROGRESS NOTE ADULT - PROVIDER SPECIALTY LIST ADULT
Anesthesia
Family Medicine
Infectious Disease
Pain Medicine
Podiatry
Rehab Medicine
Rehab Medicine
Vascular Surgery
Podiatry
Family Medicine
Family Medicine
Pain Medicine
Pain Medicine

## 2019-08-13 NOTE — DISCHARGE NOTE PROVIDER - HOSPITAL COURSE
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 Pt admitted with severe sepsis secondary to wet gangrene of RLE, complicated by maggot infestation of wound. 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 Amputee support team on board and prosthetic device at bedside. Sepsis resolved post sx. Empirically on vanco/ zosyn, Bcx negative. Hospital course complicated by acute on chronic anemia in the setting of AOCD, s/p 2 u prbc on admission and 1 u prbc intraop with appropriate response to hgb, remains stable thereafter. FOBT pending but no evidence of active bleeding. Also noted with BRANT/ hyponatremia likely due to dehydration, s/p ivf fluid resuscitation with improving renal function and sodium.ID, Podiatry, Vascular, PM and Psych consults noted. PT consult noted, recommends acute rehab. OT and PM&R consults noted. PM consulted noted, pain regimen adjusted s/p revision sx. s/p amputation, patient continues to have pain which is difficult to control.         Wet gangrene of the RLE with maggot infestation     Initially presenting with severe sepsis + reactive thrombocytosis     Severe sepsis resolved     - s/p emergent source control and s/p Guillotine BKA POD #11.  POD #5 s/p right AKA revision with proximal femoral nerve resection.    -leucocytosis normalized    - elevated esr/ crp   - afebrile  and normotensive - B cx negative X 2    - completed Zosyn 3.375 g IV q8h D#11 on 8/11. S/p vanco. No further abx needed.     - pain control as per pain mx, no role for suboxone until off of acute narcotics     - c/w Dilaudid on a sliding scale, robaxin and gabapentin     - c/w Dilaudid 0.5mg IV daily PRN or severe/breakthrough pain added    -at the advice of vascular surgery, toradol ordered standing for 2 days     -pain management follow up requested.     - ID f/u noted and appreciated    - vascular sx f/u  noted and appreciated.  POD #5 s/p right AKA revision with proximal femoral nerve resection. Amputee support team on board. Representatives from prosthetic devices were at bedside previously.    - Podiatry, pain management and psych also on board     -PT consult noted. Recommends acute rehab and also OT and PM&R consults.         Sinus tachycardia - at this time could also be due to pain     - Stable now. If persists will consult Cardiology. Repeat EKG sinus tachycardia. Lopressor 5mg IVP once for HR >120, to be given if tachycardia persists.    - Pt denies CP, SOB.    - TTE: Normal LVSF, EF 65-70; No right heart strain; Mild Pul HTN; mod enlarged LA    -c/w bb for now and pain control         Hypomagnesemia     -S/p repletion    -F/u BMP am         Depression    -Confirmed by PMD Dr. Pierce    -C/w Cymbalta 60mg BID; seen by psych, will re consult due to concerns for depression and also when ready to transition pt to suboxone; psych f/up once ready for discharge         BRANT - resolved    Hypocaclemia     - likely secondary to dehydration; severe sepsis now resolved     -Improving renal fxn to wnl  -sodium now wnl    -corrected amount is 9.4, no further intervention     -S/p albumin replacement         Acute on Chronic Anemia     - noted AOCD, now stable     -Initial Hb 8.3, downtrended to 6.4   - likely bc pt was initially dehydrated     -s/p 2U PRBCs on admision and 1 u prbc intra op     - FOBT pending, no evidence of bleeding     - will monitor hgb and transfuse for hgb <7     -C/w iron supplement, - c/w vitamin c         IDDM-2 poorly controlled    -HbA1c 7    - c/w accuchecks ACHS TID    - pt with am sugars 170-190, to complete last abx in dextrose today.     - c/w 7 units lantus, c/w ISS    - will adjust accordingly        HTN/HLD    -C/w Metoprolol, Enalapril. BP slightly elevated likely due to pain . will repeat bp today and if elevated, would consider adding norvasc     -c/w lipitor         H/o polysubstance use disorder - currently taking Suboxone    -Utox negative    -Last dose of Suboxone was day prior to hospitalization; however, per ISTOP patient had not had subaxone since April - will check ISTOP    -currently pt requiring narcotics for pain control     - labile emotions - psych reconsult for signs of depression    -Psych consult noted and appreciated, will reconsult when pt is off narcotics or defer to outpt management         B/l groin intertriginous dermatitis    -C/w Nystatin powder bid    -Continue to monitor for signs of infection         Preventative Measures    DVT ppx: started Lovenox 40 - benefits outweigh risk. s/p BKA, immobile, sedentary, platelets stable, renal functions wnl.        Advanced Directive: Full code    Next of kin: Brother Jose        Dispo:  Likely d/c to [facility]. Pt eval noted. OT and PM&R consults noted, pt needs facility that accepts Suboxone use. 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 Pt admitted with severe sepsis secondary to wet gangrene of RLE, complicated by maggot infestation of wound. 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 Amputee support team on board and prosthetic device at bedside. Sepsis resolved post sx. Empirically started on vanco/ zosyn, Bcx negative. MRSA negative hence vanco was discontinued. Post procedure, pain was controlled by IV dilaudid, toradol, robaxin, gabapentin, tylenol.         Hospital course complicated by sinus tachycardia which is most likely due to pain. PE was ruled out by negative CT angio chest. Multiple EKG's were consistent with sinus tachycardia. Echo showed EF 65-70%; LVSF, no right heart strain, mild PUL HTN, mod enlarged LA. Patient was started on beta blocker. On admission, norvasc was held which was restarted on 5mg dose to be titrated for BP control goal of <140/90. Furthermore, Hospital course complicated by acute on chronic anemia in the setting of AOCD, s/p 2 u prbc on admission and 1 u prbc intraop with appropriate response to hgb, remains stable thereafter. FOBT pending but no evidence of active bleeding. Also noted with BRANT/ hyponatremia likely due to dehydration, s/p ivf fluid resuscitation with improving renal function and sodium. ID, Podiatry, Vascular, PM and Psych consults noted. PT consult noted, recommends acute rehab. OT and PM&R consults noted. PM consulted noted, pain regimen adjusted s/p revision sx. s/p amputation, patient continues to have pain which is difficult to control.     Hospital course complicated by        Of note, patient's subxone was held on admission due to pain controlled by acute narcotic. Psych was consulted and recommended to hold suboxone while on acute narcotic, to be continued once patient is off narcotic and clinically stable.                 Sinus tachycardia - at this time could also be due to pain     - Stable now. If persists will consult Cardiology. Repeat EKG sinus tachycardia. Lopressor 5mg IVP once for HR >120, to be given if tachycardia persists.    - Pt denies CP, SOB.    - TTE: Normal LVSF, EF 65-70; No right heart strain; Mild Pul HTN; mod enlarged LA    -c/w bb for now and pain control         Hypomagnesemia     -S/p repletion    -F/u BMP am         Depression    -Confirmed by PMD Dr. Pierce    -C/w Cymbalta 60mg BID; seen by psych, will re consult due to concerns for depression and also when ready to transition pt to suboxone; psych f/up once ready for discharge         BRANT - resolved    Hypocaclemia     - likely secondary to dehydration; severe sepsis now resolved     -Improving renal fxn to wnl  -sodium now wnl    -corrected amount is 9.4, no further intervention     -S/p albumin replacement         Acute on Chronic Anemia     - noted AOCD, now stable     -Initial Hb 8.3, downtrended to 6.4   - likely bc pt was initially dehydrated     -s/p 2U PRBCs on admision and 1 u prbc intra op     - FOBT pending, no evidence of bleeding     - will monitor hgb and transfuse for hgb <7     -C/w iron supplement, - c/w vitamin c         IDDM-2 poorly controlled    -HbA1c 7    - c/w accuchecks ACHS TID    - pt with am sugars 170-190, to complete last abx in dextrose today.     - c/w 7 units lantus, c/w ISS    - will adjust accordingly        HTN/HLD    -C/w Metoprolol, Enalapril. BP slightly elevated likely due to pain . will repeat bp today and if elevated, would consider adding norvasc     -c/w lipitor         H/o polysubstance use disorder - currently taking Suboxone    -Utox negative    -Last dose of Suboxone was day prior to hospitalization; however, per ISTOP patient had not had subaxone since April - will check ISTOP    -currently pt requiring narcotics for pain control     - labile emotions - psych reconsult for signs of depression    -Psych consult noted and appreciated, will reconsult when pt is off narcotics or defer to outpt management         B/l groin intertriginous dermatitis    -C/w Nystatin powder bid    -Continue to monitor for signs of infection         Preventative Measures    DVT ppx: started Lovenox 40 - benefits outweigh risk. s/p BKA, immobile, sedentary, platelets stable, renal functions wnl.        Advanced Directive: Full code    Next of kin: Brother Jose        Dispo:  Likely d/c to [facility]. Pt eval noted. OT and PM&R consults noted, pt needs facility that accepts Suboxone use. 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 Pt admitted with severe sepsis secondary to wet gangrene of RLE, complicated by maggot infestation of wound. Vascular sx consulted and pt s/p wound cleansing and went to the OR for emergent source control and s/p Guillotine R BKA on 8/1 followed by Rt AKA revision with proximal femoral nerve resection on 8/7. Amputee support team on board and prosthetic device at bedside. Sepsis resolved post sx. Empirically started on vanco/ zosyn, Bcx negative. MRSA negative hence vanco was discontinued. Post procedure, pain management was counsulted. pain was controlled by IV dilaudid, toradol, robaxin, gabapentin, tylenol, cymbalta. Patient also has a Lt foot ulcer for which podiatry was consulted. Recommended daily dressing changes with silvadene cream. Xray of foot was negative for osteomyelitis.         Hospital course complicated by sinus tachycardia which is most likely due to pain. PE was ruled out by negative CT angio chest. Multiple EKG's were consistent with sinus tachycardia. Echo showed EF 65-70%; LVSF, no right heart strain, mild PUL HTN, mod enlarged LA. Patient was started on beta blocker. On admission, norvasc was held which was restarted on 5mg dose to be titrated with enalapril for BP control goal of <140/90. Furthermore, Hospital course complicated by acute on chronic anemia in the setting of AOCD, s/p 2 u prbc on admission and 1 u prbc intraop with appropriate response to hgb, remains stable thereafter. FOBT pending but no evidence of active bleeding. Also noted with BRANT/ hyponatremia likely due to dehydration, s/p ivf fluid resuscitation with improving renal function and sodium. ID, Podiatry, Vascular, PM and Psych consults noted. Physical and occupational therapy consult noted, recommends acute rehab. Hospital course complicated by hypomagnesemia and hypokalemia, repleted as needed. Corrected calcium within normal limits.         Of note, patient with hx of IVDU, with Utox negative, patient's suboxone was held on admission due to pain controlled by acute narcotic. Psych was consulted and recommended to hold suboxone while on acute narcotic, to be continued once patient is off narcotic and clinically stable. Pain management recommended to restart suboxone if pain not controlled.        For DM, pt was started on lantus, titrated accordingly and covered with ISS. DVT prophylaxis was achieved through loveox. Patient started on nystatin powder for b/l interginious dermatitis, tolerating well.        All electrolyte abnormalities were monitored carefully and repleted as necessary during this hospitalization. At the time of discharge patient was hemodynamically stable and amenable to all terms of discharge. The patient has received verbal instructions from myself regarding discharge plans.         Length of Discharge: 45MIN        Vital Signs Last 24 Hrs    T(C): 36.7 (13 Aug 2019 09:12), Max: 36.7 (12 Aug 2019 17:30)    T(F): 98 (13 Aug 2019 09:12), Max: 98.1 (12 Aug 2019 17:30)    HR: 108 (13 Aug 2019 09:12) (108 - 116)    BP: 148/88 (13 Aug 2019 09:12) (148/88 - 175/100)    RR: 20 (13 Aug 2019 09:12) (18 - 20)    SpO2: 98% (13 Aug 2019 04:55) (98% - 98%)        GENERAL: Obese, Resting in bed, poor hygiene     HEENT: PERRLA, pupil constricted to light b/l, poor dentition     RESP: CTA b/l, no crackles, no wheezing or rhonchi     CVS: RRR, Normal S1 & S2, No m/r/g    GI: soft +BS normoactive, NT, ND, b/l groin intertriginous dermatitis - no signs of infection     Extremities: Rt foot s/p R AKA staples in place, no erythema, no wound discharge, L foot - superficial ulcer 4x5cm, no active drainage, s/p partial amputation of foot clean dry no evidence of infection. Healing well.     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 Pt admitted with severe sepsis secondary to wet gangrene of RLE, complicated by maggot infestation of wound. Vascular sx consulted and pt s/p wound cleansing and went to the OR for emergent source control and s/p Guillotine R BKA on 8/1 followed by Rt AKA revision with proximal femoral nerve resection on 8/7. Amputee support team on board and prosthetic device at bedside. Sepsis resolved post sx. Empirically started on vanco/ zosyn, Bcx negative. MRSA negative hence vanco was discontinued. Post procedure, pain management was counsulted. pain was controlled by IV dilaudid, toradol, robaxin, gabapentin, tylenol, cymbalta. Patient also has a Lt foot ulcer for which podiatry was consulted. Recommended daily dressing changes with silvadene cream. Xray of foot was negative for osteomyelitis.         Hospital course complicated by sinus tachycardia which is most likely due to pain. PE was ruled out by negative CT angio chest. Multiple EKG's were consistent with sinus tachycardia. Echo showed EF 65-70%; LVSF, no right heart strain, mild PUL HTN, mod enlarged LA. Patient was started on beta blocker. On admission, norvasc was held which was restarted on 5mg dose to be titrated with enalapril for BP control goal of <140/90. Furthermore, Hospital course complicated by acute on chronic anemia in the setting of AOCD, s/p 2 u prbc on admission and 1 u prbc intraop with appropriate response to hgb, remains stable thereafter.  no evidence of active bleeding. Also noted with BRANT/ hyponatremia likely due to dehydration, s/p ivf fluid resuscitation with improving renal function and sodium. ID, Podiatry, Vascular, PM and Psych consults noted. Physical and occupational therapy consult noted, recommends acute rehab. Hospital course complicated by hypomagnesemia and hypokalemia, repleted as needed. Corrected calcium within normal limits.         Of note, patient with hx of IVDU, with Utox negative, patient's suboxone was held on admission due to pain controlled by acute narcotic. Psych was consulted and recommended to hold suboxone while on acute narcotic, to be continued once patient is off narcotic and clinically stable.  Patient has been instructed after being discharged from [facility] to be evaluated by psych and pain management to be started on suboxone again.        For DM, pt was started on lantus, titrated accordingly and covered with ISS. DVT prophylaxis was achieved through loveox. Patient started on nystatin powder for b/l interginious dermatitis, tolerating well.        All electrolyte abnormalities were monitored carefully and repleted as necessary during this hospitalization. At the time of discharge patient was hemodynamically stable and amenable to all terms of discharge. The patient has received verbal instructions from myself regarding discharge plans.         Length of Discharge: 45MIN        Vital Signs Last 24 Hrs    T(C): 36.7 (13 Aug 2019 09:12), Max: 36.7 (12 Aug 2019 17:30)    T(F): 98 (13 Aug 2019 09:12), Max: 98.1 (12 Aug 2019 17:30)    HR: 108 (13 Aug 2019 09:12) (108 - 116)    BP: 148/88 (13 Aug 2019 09:12) (148/88 - 175/100)    RR: 20 (13 Aug 2019 09:12) (18 - 20)    SpO2: 98% (13 Aug 2019 04:55) (98% - 98%)        GENERAL: Obese, Resting in bed, poor hygiene     HEENT: PERRLA, pupil constricted to light b/l, poor dentition     RESP: CTA b/l, no crackles, no wheezing or rhonchi     CVS: RRR, Normal S1 & S2, No m/r/g    GI: soft +BS normoactive, NT, ND, b/l groin intertriginous dermatitis - no signs of infection     Extremities: Rt foot s/p R AKA staples in place, no erythema, no wound discharge, L foot - superficial ulcer 4x5cm, no active drainage, s/p partial amputation of foot clean dry no evidence of infection. Healing well.     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 Pt admitted with severe sepsis secondary to wet gangrene of RLE, complicated by maggot infestation of wound. Vascular sx consulted and pt s/p wound cleansing and went to the OR for emergent source control and s/p Guillotine R BKA on 8/1 followed by Rt AKA revision with proximal femoral nerve resection on 8/7. Amputee support team on board and prosthetic device at bedside. Sepsis resolved post sx. Empirically started on vanco/ zosyn, Bcx negative. MRSA negative hence vanco was discontinued. Post procedure, pain management was counsulted. pain was controlled by IV dilaudid, toradol, robaxin, gabapentin, tylenol, cymbalta. Patient also has a Lt foot ulcer for which podiatry was consulted. Recommended daily dressing changes with silvadene cream. Xray of foot was negative for osteomyelitis.         Hospital course complicated by sinus tachycardia which is most likely due to pain. PE was ruled out by negative CT angio chest. Multiple EKG's were consistent with sinus tachycardia. Echo showed EF 65-70%; LVSF, no right heart strain, mild PUL HTN, mod enlarged LA. Patient was started on beta blocker. On admission, norvasc was held which was restarted on 5mg dose to be titrated with enalapril for BP control goal of <140/90. Furthermore, Hospital course complicated by acute on chronic anemia in the setting of AOCD, s/p 2 u prbc on admission and 1 u prbc intraop with appropriate response to hgb, remains stable thereafter.  no evidence of active bleeding. Also noted with BRANT/ hyponatremia likely due to dehydration, s/p ivf fluid resuscitation with improving renal function and sodium. ID, Podiatry, Vascular, PM and Psych consults noted. Physical and occupational therapy consult noted, recommends acute rehab. Hospital course complicated by hypomagnesemia and hypokalemia, repleted as needed. Corrected calcium within normal limits.         Of note, patient with hx of IVDU, with Utox negative, patient's suboxone was held on admission due to pain controlled by acute narcotic. Psych was consulted and recommended to hold suboxone while on acute narcotic, to be continued once patient is off narcotic and clinically stable.  Patient has been instructed after being discharged from [facility] to be evaluated by psych and pain management to be started on suboxone again.        For DM, pt was started on lantus, titrated accordingly and covered with ISS. DVT prophylaxis was achieved through loveox. Patient started on nystatin powder for b/l interginious dermatitis, tolerating well.        All electrolyte abnormalities were monitored carefully and repleted as necessary during this hospitalization. At the time of discharge patient was hemodynamically stable and amenable to all terms of discharge. The patient has received verbal instructions from myself regarding discharge plans.         Length of Discharge: 45MIN        On day of discharge:     Vital Signs Last 24 Hrs    T(C): 36.7 (13 Aug 2019 09:12), Max: 36.7 (12 Aug 2019 17:30)    T(F): 98 (13 Aug 2019 09:12), Max: 98.1 (12 Aug 2019 17:30)    HR: 108 (13 Aug 2019 09:12) (108 - 116)    BP: 148/88 (13 Aug 2019 09:12) (148/88 - 175/100)    RR: 20 (13 Aug 2019 09:12) (18 - 20)    SpO2: 98% (13 Aug 2019 04:55) (98% - 98%)        GENERAL: Obese, Resting in bed, poor hygiene     HEENT: PERRLA, pupil constricted to light b/l, poor dentition     RESP: CTA b/l, no crackles, no wheezing or rhonchi     CVS: RRR, Normal S1 & S2, No m/r/g    GI: soft +BS normoactive, NT, ND, b/l groin intertriginous dermatitis - no signs of infection     Extremities: Rt foot s/p R AKA staples in place, no erythema, no wound discharge, L foot - superficial ulcer 4x5cm, no active drainage, s/p partial amputation of foot clean dry no evidence of infection. Healing well.     Neuro: AAOX3, non focal     Skin: Groin irritation noted, decreased erythema present b/l

## 2019-08-13 NOTE — DISCHARGE NOTE PROVIDER - PROVIDER TOKENS
PROVIDER:[TOKEN:[[id number]]],PROVIDER:[TOKEN:[[id number]]],PROVIDER:[TOKEN:[[id number]]],FREE:[LAST:[Primary care physician],PHONE:[(   )    -],FAX:[(   )    -]]

## 2019-08-13 NOTE — PROGRESS NOTE ADULT - PROBLEM SELECTOR PLAN 1
1. Gabapentin 300mg PO BID  2. Resume Tylenol 975mg TID  3. Dilaudid 4mg PO q4-6hrs PRN moderate-severe pain  - Consider discontinuation of alternate dose of Dilaudid 6mg PO 48hrs post op  - Discontinue Dilaudid 1mg subcutaneous q6hrs  48 hrs post-op  4. Resume Robaxin 750mg PO q8hrs   5. Consider Celebrex 200mg daily as alternative to Ketorolac presently ordered.
1. Continue Gabapentin 300mg PO TID  2. Continue Tylenol 975mg TID  3. Dilaudid 2- 4mg PO q4hrs PRN moderate-severe pain respectively  - Discontine Oxycodone IR  - Discontinue Dilaudid IV 1mg 48 hrs post-op  4. Continue Robaxin 750mg PO q8hrs   5. Continue Cymbalta 20mg BID as presently ordered
increase gabapentin, encourage use of prn med; consider rotation back to suboxone (tid dosing of 8/2 product roughly equivalent to 90 oxycodone or 135 mg morphine equivalents /d).
1. Continue Gabapentin 300mg PO TID  2. Continue Tylenol 975mg TID  3. Dilaudid 2- 4mg PO q4hrs PRN moderate-severe pain respectively  - Wean as able  - Dilaudid IV 1mg reduced to 0.5mg q6hrs PRN x 2 days  4. Continue Robaxin 750mg PO q8hrs   5. Continue Cymbalta presently ordered.
1. Continue with increase in Gabapentin 600mg PO TID  2. Continue Tylenol 975mg TID  3. Dilaudid 2- 4mg PO q4hrs PRN moderate-severe pain respectively  - Discontinue IV Dilaudid 0.5mg prior to hospital discharge  4. Continue Robaxin 750mg PO q8hrs   5. Continue Cymbalta presently ordered.
will use hydromorphone PCA for 24 hours, then revert back to current po regimen

## 2019-08-13 NOTE — DISCHARGE NOTE PROVIDER - NSDCFUADDINST_GEN_ALL_CORE_FT
9.2    8.82  )-----------( 523      ( 13 Aug 2019 07:07 )             31.1     08-13    141  |  101  |  9.0  ----------------------------<  227<H>  3.4<L>   |  27.0  |  0.46<L>    Ca    8.6      13 Aug 2019 07:07  Phos  4.3     08-13  Mg     1.4     08-13    TPro  6.6  /  Alb  2.9<L>  /  TBili  0.2<L>  /  DBili  x   /  AST  15  /  ALT  12  /  AlkPhos  251<H>  08-13    Summary:   1. Technically limited study.   2. Normal left ventricular internal cavity size.   3. Normal global left ventricular systolic function.   4. Left ventricular ejection fraction, by visual estimation, is 65 to   70%.   5. The left ventricular diastolic function could not be assessed in this   study.   6. Normal right ventricular size and function.   7. Moderately enlarged left atrium.   8. Normal trileaflet aortic valve with normal opening.   9. Estimated pulmonary artery systolic pressure is 45.4 mmHg assuming a   right atrial pressure of 5 mmHg, which is consistent with mild pulmonary   hypertension.  10. There is no evidence of pericardial effusion.    < end of copied text >    IMPRESSION:    Soft tissue swelling with ulceration following transmetatarsal   amputation. Subtle of periosteal reaction of the fourth remnant   metatarsal bones.    If osteomyelitis is considered, despite conservative therapy, and soft   tissue / bone infection requires further assessment, follow-up MRI   recommended.    < end of copied text >

## 2019-08-13 NOTE — PROGRESS NOTE ADULT - PROBLEM SELECTOR PROBLEM 1
Acute postoperative pain of extremity

## 2019-08-13 NOTE — DISCHARGE NOTE PROVIDER - NSDCCPCAREPLAN_GEN_ALL_CORE_FT
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 You have staples in place for which you need to follow up with vascular surgery after 2 weeks of discharge. If you notice any wound discharge, redness, worsening pain from baseline then please contact your physician or report to ER.      SECONDARY DISCHARGE DIAGNOSES  Diagnosis: History of drug use  Assessment and Plan of Treatment: You have a history of IV drug use for which your utox was negative on admission. Your suboxone was held on admission and should be restarted after completion of your rehabilitation. Please follow up with your pain managment and psychiatrist before restarting the medication.    Diagnosis: Diabetic ulcer of left foot  Assessment and Plan of Treatment: You have a superficial left foot ulcer. Xray was negative for infection of bone. Podiatry recommneded daily dressing change with sulfadene cream and dry gauze. Continue to monitor the site for any worsening. If you notice any worsening foot pain, discharge, redness please seek care immediately by contacting your primary care physician after discharge.    Diagnosis: Intertriginous dermatitis associated with moisture  Assessment and Plan of Treatment: You were stared on nystatin powerd to be applied daily. Please monitor the site for worsening redness, pain. Uncontrolled blood sugar can make the condition worse so please have adequate blood sugar control.    Diagnosis: HTN (hypertension)  Assessment and Plan of Treatment: Be sure to follow a low salt diet. Continue antihypertensive medications like enalapril, beta blocker and norvasc to control your blood pressure, be sure to take them every day as prescribed and do not miss any doses, the medications do not work if they are not taken consistently. Your norvasc was started at 5 mg so please continue that dose and adjust the medication as needed. Follow up with your Primary Care Doctor and have your Blood Pressure checked.    Diagnosis: Diabetes mellitus  Assessment and Plan of Treatment: Follow a low carb low sugar diet. Continue to take all antidiabetic medications/insulin as prescribed. Follow up with your Primary Care Doctor regularly for blood sugar/A1c checks. Be sure to see an eye doctor and foot doctor on an annual basis.    Diagnosis: Acute on chronic anemia  Assessment and Plan of Treatment: You were given 2 units of PRBC on admission. Since then your hemoglobin has been stable. No acute bleeding episodes were noted from oral cavity and rectum. Follow up with your Primary Care Doctor to review your blood work regularly. You can discuss doing a work up to find the source of your low blood counts when you are seen if the office if you desire further investigation. Be sure to alert a medical professional immediately if you have symptoms of acute or worsening anemia including but not limited to the following: lightheadedness, dizziness, paleness, palpitations, shortness of breath, abnormal bleeding.    Diagnosis: Hypokalemia  Assessment and Plan of Treatment: Your potassium was found to be low as a result it was repleted as necessary. Continue to eat a healthy diet and follow up with your primary care physician after discharge.    Diagnosis: Hypomagnesemia  Assessment and Plan of Treatment: Your magnesium was found to be low as a result it was repleted as necessary. Continue to eat a healthy diet and follow up with your primary care physician after discharge.    Diagnosis: BRANT (acute kidney injury)  Assessment and Plan of Treatment: You were noted to have a temporary insult to your kidney function either at the time that you arrived at the hospital or during your stay here. We have monitored your kidney function with blood work during your time here and you are at a level that no longer requires continued hospital level care, but we do recommend that you follow up to continually have your kidney function checked. You can follow up with your primary care doctor, or, if recommended in the discharge paperwork, you should follow up with a Kidney specialist called a Nephrologist.    Diagnosis: Depression  Assessment and Plan of Treatment: Continue to take cymbalta as prescribed. No change was done on your medication and plan. It might also help with neuropathic pain. 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 You have staples in place for which you need to follow up with vascular surgery after 2 weeks  (no later than 9/7) of discharge. If you notice any wound discharge, redness, worsening pain from baseline then please contact your physician or report to ER.      SECONDARY DISCHARGE DIAGNOSES  Diagnosis: History of drug use  Assessment and Plan of Treatment: You have a history of IV drug use for which your utox was negative on admission. Your suboxone was held on admission and should be restarted after completion of your rehabilitation. Please follow up with your pain managment and psychiatrist before restarting the medication.    Diagnosis: Diabetic ulcer of left foot  Assessment and Plan of Treatment: You have a superficial left foot ulcer. Xray was negative for infection of bone. Podiatry recommneded daily dressing change with sulfadene cream and dry gauze. Continue to monitor the site for any worsening. If you notice any worsening foot pain, discharge, redness please seek care immediately by contacting your primary care physician after discharge.    Diagnosis: Intertriginous dermatitis associated with moisture  Assessment and Plan of Treatment: You were stared on nystatin powerd to be applied daily. Please monitor the site for worsening redness, pain. Uncontrolled blood sugar can make the condition worse so please have adequate blood sugar control.    Diagnosis: HTN (hypertension)  Assessment and Plan of Treatment: Be sure to follow a low salt diet. Continue antihypertensive medications like enalapril, beta blocker and norvasc to control your blood pressure, be sure to take them every day as prescribed and do not miss any doses, the medications do not work if they are not taken consistently. Your norvasc was started at 5 mg so please continue that dose and adjust the medication as needed. Follow up with your Primary Care Doctor and have your Blood Pressure checked.    Diagnosis: Diabetes mellitus  Assessment and Plan of Treatment: Follow a low carb low sugar diet. Continue to take all antidiabetic medications/insulin as prescribed. Follow up with your Primary Care Doctor regularly for blood sugar/A1c checks. Be sure to see an eye doctor and foot doctor on an annual basis.    Diagnosis: Acute on chronic anemia  Assessment and Plan of Treatment: You were given 2 units of PRBC on admission. Since then your hemoglobin has been stable. No acute bleeding episodes were noted from oral cavity and rectum.    Diagnosis: Hypokalemia  Assessment and Plan of Treatment: Your potassium was found to be low as a result it was repleted as necessary.    Diagnosis: Hypomagnesemia  Assessment and Plan of Treatment: Your magnesium was found to be low as a result it was repleted as necessary.    Diagnosis: BRANT (acute kidney injury)  Assessment and Plan of Treatment: You were noted to have a temporary insult to your kidney function either at the time that you arrived at the hospital or during your stay here. We have monitored your kidney function with blood work during your time here and you are at a level that no longer requires continued hospital level care, but we do recommend that you follow up to continually have your kidney function checked.    Diagnosis: Depression  Assessment and Plan of Treatment: Continue to take cymbalta as prescribed. No change was done on your medication and plan. It might also help with neuropathic pain.

## 2019-08-13 NOTE — DISCHARGE NOTE PROVIDER - CARE PROVIDER_API CALL
ManvarSingh, Pallavi B (MD)  Vascular Surgery  [address] [facility], [address] [place] [address]  Phone: [phone]  Fax: [phone]  Follow Up Time:     Adria Parsons (DO)  Anesthesiology; Pain Medicine  [address] [facility], [address] [place] [id number]  Phone: [phone]  Fax: [phone]  Follow Up Time:     Jomar Khan (DPM)  Podiatric Medicine and Surgery  [address] [place] [address]  Phone: [phone]  Fax: [phone]  Follow Up Time:     Primary care physician,   Phone: (   )    -  Fax: (   )    -  Follow Up Time:

## 2019-08-13 NOTE — PROGRESS NOTE ADULT - PROBLEM SELECTOR PLAN 2
1. Not a good candidate for ongoing opioid use  - Resume Suboxone prior to facility discharge.
1. Not a good candidate for ongoing opioid use  - Resume Suboxone prior to facility discharge.
as above
1. Not a good candidate for long term opioid use  - Resume Suboxone prior to facility (hospital or [facility]) discharge.  2. Psychiatry consultation.
1. Not a good candidate for ongoing opioid use  - Resume Suboxone prior to facility discharge.
increase gabapentin to 300 mg tid (currently bid)

## 2019-08-13 NOTE — PROGRESS NOTE ADULT - SUBJECTIVE AND OBJECTIVE BOX
This am patient is seen sitting in bedside chair.  C/O phantom pain, sharp in character, right knee (s/p Right AKA 8/7/19).  States that he is unable to sleep in bed secondary to pain.  Currently he is prescribed gabapentin 600 mg tid, has hydromorphone 2/4 mg available, using on average 4 doses (4 mg tabs) and two doses (2 mg tabs) daily past few days.      PE does notes some tachydcardia and hypertension.  Fortunately he has been afebrile.   Right surgical site / stump shows staples in place, no signs infection at this time.      HPI:  54M hx HTN, HLD, DM2, ETOH abuse, IVDU (on suboxone), multiple toe/foot amputations presents with severe foot infection. Patient states that lives on own and hasn't been able to leave house for the last month. He states he's friend with delivery people that bring him his food and meds. He's had multiple foot/toe amputations due to diabetic ulcers. The last one was about two years ago. He's noticed this foot wound brewing for a while, but has not sought medical attention because was concerned that would need amputation and thought could take care of it himself. He states for the last month has not mila able to walk on foot. He states for the past few days he's been having worsening bilateral leg and foot pain and has felt very fatigued. Today his brother came to check on him and upon seeing the foot wound made him call EMS and come to ED.     Pt has history of etoh abuse, but states cut back a lot. He states drinks bottle of champagne a couple times a month and last time being two weeks ago. He also initially denied history of IVDU, however when asked about suboxone subscription he states that he's been on suboxone for last 2-3 years.     Upon arriving to ED, patient was had temp of 99.7, heart rate 150 (improved to 126 s/p ivf bolus), bp 86/54 (improved to 97/54), RR- 22 saturating 95% on room air. Patient was found to have diffuse ulcers of foot and ankle with exposed bones/tendons and infested with maggots. Patient given 2.7 bolus, vanc and zosyn. Podiatry washed out wound. (31 Jul 2019 23:37)    Patient is now s/p AKA of his right lower extremity. Patient continues to be in a lot of pain at the surgical site, especially with movement of the extremity.     REC:  1. increase gabapentin to 800 mg tid  2. encourage more aggressive use of prn pain med (available [id number]).  3. consider rotation to suboxone on discharge?  will need to get into contact with prior treating physician for outpatient f/u: Cammy Loya DO  last Rx for Suboxone film 8/2 tid (#90) on 4/17/19

## 2019-08-13 NOTE — DISCHARGE NOTE NURSING/CASE MANAGEMENT/SOCIAL WORK - NSDCPEWEB_GEN_ALL_CORE
[facility] for Tobacco Control website --- [email or web address] website --- [email or web address]

## 2019-08-13 NOTE — PROGRESS NOTE ADULT - PROBLEM SELECTOR PLAN 3
will need to f/u with prior suboxone doc on d/c
will aggressively wean back opioid agents as able; convert to suboxone on discharge; f/u with prior prescribing subox physcian

## 2019-08-13 NOTE — PROGRESS NOTE ADULT - REASON FOR ADMISSION
sepsis, foot wound

## 2019-08-13 NOTE — DISCHARGE NOTE NURSING/CASE MANAGEMENT/SOCIAL WORK - NSDCDPATPORTLINK_GEN_ALL_CORE
You can access the [facility], offered by [facility], by registering with the following website: [email or web address]

## 2019-08-13 NOTE — DISCHARGE NOTE NURSING/CASE MANAGEMENT/SOCIAL WORK - NSDCPEEMAIL_GEN_ALL_CORE
[facility] for Tobacco Control email [email or web address]

## 2019-08-13 NOTE — PROGRESS NOTE ADULT - SUBJECTIVE AND OBJECTIVE BOX
Vascular Surgery fOLLOW UP:    S/p Completion Right AKA    Surgical site well intact with staples    Patient with pain complaints, which resolved post medication administration.    - patient is stable from Vascular Stand Point to be discharged from the Hospital.  - Patient advised to continue to utilize the Limb protection device.  - Please have patient follow up in the office with Dr Maldonado no later than 9/7 for reeval and staple removal    Vascular Surgery to sign off

## 2019-08-13 NOTE — PROGRESS NOTE ADULT - SUBJECTIVE AND OBJECTIVE BOX
54y old  Male refused to be seen at bedside for left foot ulcer today, by podiatry.    HPI: Pt seen bedside with AKA on right. States he is feeling much better than when he first came in.  Denies f/c/n/v/sob.       PMH: Hyperlipidemia  HTN (hypertension)  DM (diabetes mellitus)    Allergies: Allergy Status Unknown    Medications: acetaminophen   Tablet .. 975 milliGRAM(s) Oral once  Dakins Solution - 1/2 Strength 1 Application(s) Topical Once  dextrose 50% Injectable 50 milliLiter(s) IV Push Once  insulin regular  human recombinant. 6 Unit(s) IV Push once  vancomycin  IVPB 1000 milliGRAM(s) IV Intermittent once    FH:  PSX: Amputation of toe, left, traumatic  Amputation of great toe, right, traumatic    SH: acetaminophen   Tablet .. 975 milliGRAM(s) Oral once  Dakins Solution - 1/2 Strength 1 Application(s) Topical Once  dextrose 50% Injectable 50 milliLiter(s) IV Push Once  insulin regular  human recombinant. 6 Unit(s) IV Push once  vancomycin  IVPB 1000 milliGRAM(s) IV Intermittent once    ICU Vital Signs Last 24 Hrs  T(C): 36.7 (13 Aug 2019 09:12), Max: 36.7 (12 Aug 2019 17:30)  T(F): 98 (13 Aug 2019 09:12), Max: 98.1 (12 Aug 2019 17:30)  HR: 108 (13 Aug 2019 09:12) (108 - 116)  BP: 148/88 (13 Aug 2019 09:12) (148/88 - 175/100)  BP(mean): --  ABP: --  ABP(mean): --  RR: 20 (13 Aug 2019 09:12) (18 - 20)  SpO2: 98% (13 Aug 2019 04:55) (98% - 98%)                        9.2    8.82  )-----------( 523      ( 13 Aug 2019 07:07 )             31.1   WBC Count: 8.82 K/uL (08-13 @ 07:07)  WBC Count: 9.91 K/uL (08-12 @ 08:41)  WBC Count: 7.45 K/uL (08-11 @ 10:10)  WBC Count: 7.52 K/uL (08-10 @ 08:20)  WBC Count: 7.86 K/uL (08-09 @ 07:52)    Sedimentation Rate, Erythrocyte: 61 mm/hr (08-01-19 @ 16:47)  C-Reactive Protein, Serum: 24.40 mg/dL (08-01-19 @ 16:47)      PHYSICAL EXAM  GEN: BREANNA MENDOZA is a pleasant well-nourished, well developed 54y Male in no acute distress, alert awake, and oriented to person, place and time.       < from: Xray Foot AP + Lateral + Oblique, Right (07.31.19 @ 22:25) >     EXAM:  FOOT-RIGHT                         EXAM:  ANKLE-RIGHT                          PROCEDURE DATE:  07/31/2019          INTERPRETATION:  Radiographs of the RIGHT ankle       Radiographs of the RIGHT foot  CLINICAL INFORMATION: Soft tissue ulcerations. Assess for osteomyelitis.    TECHNIQUE:   Frontal, oblique and lateral views of the ankle were   obtained.  AP lateral oblique views of the RIGHT foot  FINDINGS:   No prior examinations are available for review.    Diffuse soft tissue swellingnoted with multiple soft tissue ulcerations.   There is rocker-bottom deformity with a destructive bone lesions of the   lateral medial malleolus, but tarsal bones and metatarsal bones with   pathologic fracture of the first metatarsal bone. Findingsconsistent   with a osteomyelitis. Distal phalanx of third digit absent.   IMPRESSION:   Soft tissue swelling, ulcerations consistent with diffuse   osteomyelitis involving the phalanges and metatarsal bones tarsal bones   possibly ankle joint/show bilateral medial malleolus.                  PREET LONG M.D., ATTENDING RADIOLOGIST  This document has been electronically signed. Aug  1 2019  7:13AM                  < end of copied text >  ------------------------------------------------------------  < from: Xray Foot AP + Lateral + Oblique, Left (08.07.19 @ 14:33) >     EXAM:  FOOT-LEFT                          PROCEDURE DATE:  08/07/2019          INTERPRETATION:  LEFT foot     CLINICAL INFORMATION diabetic ulcerations. TECHNIQUE: AP,lateral and   oblique views.    FINDINGS:  There is diffuse soft tissue swelling.  Status post transmetatarsal amputation of the first through fifth digits.   Anterior and soft tissue ulcerations noted with subcutaneous air. No   gross osteolytic lesions seen. Mild periosteal reaction remaining.    Metacarpal bones noted. No acute fracture.    IMPRESSION:    Soft tissue swelling with ulceration following transmetatarsal   amputation. Subtle of periosteal reaction of the fourth remnant   metatarsal bones.    If osteomyelitis is considered, despite conservative therapy, and soft   tissue / bone infection requires further assessment, follow-up MRI   recommended.    PREET LONG M.D., ATTENDING RADIOLOGIST  This document has been electronically signed. Aug  7 2019  2:40PM      < end of copied text >  -------------------------------------------------------------------  A:  Left foot ulcer     P:  Patient refused to be seen today.  Will attempt again tomorrow.  X-rays left foot reviewed as above  Podiatry will follow while in house  Attempted to be Seen with attending Dr. Hassan     Wound Care Instructions:  1- Please apply silvadene to gauze and place on left plantar ulcer  2- Please secure with kerlix   3- Please change dressing daily

## 2019-08-13 NOTE — PROGRESS NOTE ADULT - SUBJECTIVE AND OBJECTIVE BOX
Patient in bed, naked, not wanting to cover.  RN reports he does this with his friends and family.   Plan is for [facility], pending medical/surgical clearance.   Reviewed notations, patient now wants to be on IV opiods rather than go on Suboxone.     REVIEW OF SYSTEMS  Constitutional - No fever,  +fatigue  Neurological - +loss of strength   Skin - No rashes, +lesions     FUNCTIONAL PROGRESS  8/9  Bed Mobility: Rolling/Turning:     · Level of [place]	moderate assist (50% patients effort)	  · Physical Assist/Nonphysical Assist	1 person assist; nonverbal cues (demo/gestures); verbal cues	  · Assistive Device	bed rails	    Bed Mobility: Scooting/Bridging:     · Level of [place]	maximum assist (25% patients effort); scooting	  · Physical Assist/Nonphysical Assist	1 person assist; nonverbal cues (demo/gestures); verbal cues	    Bed Mobility: Sit to Supine:     · Level of [place]	maximum assist (25% patients effort); modified due to decreased pt participation to continue	  · Physical Assist/Nonphysical Assist	1 person assist; nonverbal cues (demo/gestures); verbal cues	    Bed Mobility: Supine to Sit:     · Level of [place]	maximum assist (25% patients effort); modified due to decreased pt participation to continue	  · Physical Assist/Nonphysical Assist	1 person assist; nonverbal cues (demo/gestures); verbal cues	    Transfer: Bed to Chair:    Bed to Chair Transfer:    Transfer Skill: Bed to Chair   · Level of [place]	unsafe at this time	    Transfer: Sit to Stand:     · Level of [place]	unable to perform; due to right LE NWB and left LE PWB	    Transfer: Stand to Sit:     · Level of [place]	unable to perform; due to right LE NWB and left LE PWB	    Transfer: Toilet Transfer:     · Level of [place]	unsafe at this time	    Bathing Training:     · Level of [place]	maximum assist (25% patients effort); sponge, by report	  · Physical Assist/Nonphysical Assist	1 person assist; nonverbal cues (demo/gestures); verbal cues; set-up required	    Upper Body Dressing Training:     · Level of [place]	moderate assist (50% patients effort); gown	  · Physical Assist/Nonphysical Assist	1 person assist; nonverbal cues (demo/gestures); verbal cues	    Lower Body Dressing Training:     · Level of [place]	to be assessed	    Toilet Hygiene Training:     · Level of [place]	to be assessed	    Grooming Training:     · Level of [place]	minimum assist (75% patients effort)	  · Physical Assist/Nonphysical Assist	1 person assist; nonverbal cues (demo/gestures); verbal cues; set-up required	    Eating/Self-Feeding Training:     · Level of [place]	supervision; not observed however pt report	  · Physical Assist/Nonphysical Assist	set-up required; verbal cues	        8/8  Bed Mobility: Rolling/Turning:     · Level of [place]	moderate assist (50% patients effort)	    Bed Mobility: Sit to Supine:     · Level of [place]	maximum assist (25% patients effort)	    Bed Mobility: Supine to Sit:     · Level of [place]	maximum assist (25% patients effort)	    Bed Mobility Analysis:     · Bed Mobility Limitations	sitting tolerance limited due to pain and fatigue, verbal cues for sequencing throughout	    Transfer: Bed to Chair:     Transfer Skill: Bed to Chair   · Level of [place]	safety	      VITALS  T(C): 36.6 (08-13-19 @ 04:55), Max: 36.7 (08-12-19 @ 17:30)  HR: 111 (08-13-19 @ 04:55) (111 - 116)  BP: 163/94 (08-13-19 @ 04:55) (163/94 - 175/100)  RR: 18 (08-13-19 @ 04:55) (18 - 18)  SpO2: 98% (08-13-19 @ 04:55) (98% - 98%)  Wt(kg): --    MEDICATIONS   acetaminophen   Tablet .. 975 milliGRAM(s) every 8 hours  aluminum hydroxide/magnesium hydroxide/simethicone Suspension 30 milliLiter(s) every 6 hours PRN  ascorbic acid 500 milliGRAM(s) daily  atorvastatin 40 milliGRAM(s) at bedtime  dextrose 40% Gel 15 Gram(s) once PRN  dextrose 5%. 1000 milliLiter(s) <Continuous>  dextrose 50% Injectable 12.5 Gram(s) once  dextrose 50% Injectable 25 Gram(s) once  dextrose 50% Injectable 25 Gram(s) once  DULoxetine 60 milliGRAM(s) two times a day  enalapril 30 milliGRAM(s) daily  enoxaparin Injectable 40 milliGRAM(s) daily  ferrous    sulfate 325 milliGRAM(s) four times a day  folic acid 1 milliGRAM(s) daily  gabapentin 600 milliGRAM(s) three times a day  glucagon  Injectable 1 milliGRAM(s) once PRN  HYDROmorphone   Tablet 2 milliGRAM(s) every 4 hours PRN  HYDROmorphone   Tablet 4 milliGRAM(s) every 4 hours PRN  HYDROmorphone  Injectable 0.5 milliGRAM(s) daily PRN  insulin glargine Injectable (LANTUS) 10 Unit(s) at bedtime  insulin lispro (HumaLOG) corrective regimen sliding scale   three times a day before meals  ketorolac   Injectable 15 milliGRAM(s) every 12 hours  magnesium sulfate  IVPB 1 Gram(s) once  methocarbamol 750 milliGRAM(s) every 8 hours  metoprolol tartrate 50 milliGRAM(s) two times a day  metoprolol tartrate IVPB 5 milliGRAM(s) once PRN  multivitamin 1 Tablet(s) daily  pantoprazole    Tablet 40 milliGRAM(s) before breakfast  potassium chloride    Tablet ER 40 milliEquivalent(s) once  saccharomyces boulardii 250 milliGRAM(s) two times a day  silver sulfADIAZINE 1% Cream 1 Application(s) daily  thiamine 100 milliGRAM(s) daily  zolpidem 5 milliGRAM(s) at bedtime PRN      RECENT LABS - Reviewed                        9.2    8.82  )-----------( 523      ( 13 Aug 2019 07:07 )             31.1     08-13    141  |  101  |  9.0  ----------------------------<  227<H>  3.4<L>   |  27.0  |  0.46<L>    Ca    8.6      13 Aug 2019 07:07  Phos  4.3     08-13  Mg     1.4     08-13    TPro  6.6  /  Alb  2.9<L>  /  TBili  0.2<L>  /  DBili  x   /  AST  15  /  ALT  12  /  AlkPhos  251<H>  08-13              ----------------------------------------------------------------------------------------  PHYSICAL EXAM  Constitutional - NAD, Appears comfortable despite stating he is in extreme pain  Extremities - Right AKA wrapped, Left PVD changes with TMA  Neurologic Exam -                    Motor - as of 8/9                    LEFT    LE - HF 3/5, KE 3/5, DF 5/5, PF 5/5                    RIGHT LE - HF 2/5 (limited by pain)     Sensory - Intact to LT  Psychiatric - Mood depressed/anxious  ----------------------------------------------------------------------------------------  ASSESSMENT/PLAN  54y Male with functional deficits after osteomyelitis to Right lower extremity, now s/p right AKA with proximal femoral nerve resection. Patient is still having pain at the right lower extremity, but he is overall feeling improved since prior to admission  Osteomyelitis of RLE - s/p AKA, Zosyn  Pain - As per pain management wants Suboxone also will assist in obtaining contact with outpatient physician who prescribes this  HTN - Lopressor  HLD - Lipitor  DM2 - Lantus  DVT PPX - SCDs, Lovenox  Rehab - Patient with new right AKA with history of left TMA. Patient with questionable history to properly provide self care upon DC and now with no dispo options for home DC (family not an option either). This is further complicated by drug abuse history, would need to have all IV meds DC prior to discharge, and plans are to be placed back on Suboxone upon DC from [facility] hospital. Furthermore, patient's level of participation has been less than ideal, without ability/desire to transfer OOB and expect would not tolerate 3 hour a day intense rehab program. At this time would optimize DC medically/surgically, continue daily bedside therapy with plan for DC to [facility]. 	    Continue bedside therapy as well as OOB throughout the day with mobilization by staff to maintain cardiopulmonary function and prevention of secondary complications related to debility.     Will sign off, please reconsult for additional rehab dispo needs if functional status changes.

## 2019-08-13 NOTE — PROGRESS NOTE ADULT - PROBLEM SELECTOR PROBLEM 2
Polysubstance abuse
Neuropathic pain
Polysubstance abuse
Neuropathic pain

## 2019-08-15 PROBLEM — F10.10 ALCOHOL ABUSE, UNCOMPLICATED: Chronic | Status: ACTIVE | Noted: 2019-08-01

## 2019-08-15 PROBLEM — F11.10 OPIOID ABUSE, UNCOMPLICATED: Chronic | Status: ACTIVE | Noted: 2019-08-01

## 2019-08-27 ENCOUNTER — APPOINTMENT (OUTPATIENT)
Dept: VASCULAR SURGERY | Facility: CLINIC | Age: 54
End: 2019-08-27
Payer: MEDICARE

## 2019-08-27 VITALS
HEIGHT: 68.5 IN | SYSTOLIC BLOOD PRESSURE: 142 MMHG | DIASTOLIC BLOOD PRESSURE: 87 MMHG | OXYGEN SATURATION: 94 % | HEART RATE: 97 BPM | BODY MASS INDEX: 38.5 KG/M2 | WEIGHT: 257 LBS

## 2019-08-27 DIAGNOSIS — Z89.611 ACQUIRED ABSENCE OF RIGHT LEG ABOVE KNEE: ICD-10-CM

## 2019-08-27 DIAGNOSIS — Z86.39 PERSONAL HISTORY OF OTHER ENDOCRINE, NUTRITIONAL AND METABOLIC DISEASE: ICD-10-CM

## 2019-08-27 DIAGNOSIS — Z78.9 OTHER SPECIFIED HEALTH STATUS: ICD-10-CM

## 2019-08-27 DIAGNOSIS — Z84.89 FAMILY HISTORY OF OTHER SPECIFIED CONDITIONS: ICD-10-CM

## 2019-08-27 DIAGNOSIS — Z87.898 PERSONAL HISTORY OF OTHER SPECIFIED CONDITIONS: ICD-10-CM

## 2019-08-27 DIAGNOSIS — T87.81 DEHISCENCE OF AMPUTATION STUMP: ICD-10-CM

## 2019-08-27 DIAGNOSIS — Z86.79 PERSONAL HISTORY OF OTHER DISEASES OF THE CIRCULATORY SYSTEM: ICD-10-CM

## 2019-08-27 PROCEDURE — 99024 POSTOP FOLLOW-UP VISIT: CPT

## 2019-09-12 LAB — SURGICAL PATHOLOGY STUDY: SIGNIFICANT CHANGE UP

## 2019-09-13 ENCOUNTER — APPOINTMENT (OUTPATIENT)
Dept: VASCULAR SURGERY | Facility: CLINIC | Age: 54
End: 2019-09-13

## 2019-09-19 PROCEDURE — 93005 ELECTROCARDIOGRAM TRACING: CPT

## 2019-09-19 PROCEDURE — 86703 HIV-1/HIV-2 1 RESULT ANTBDY: CPT

## 2019-09-19 PROCEDURE — 97163 PT EVAL HIGH COMPLEX 45 MIN: CPT

## 2019-09-19 PROCEDURE — 80074 ACUTE HEPATITIS PANEL: CPT

## 2019-09-19 PROCEDURE — 96374 THER/PROPH/DIAG INJ IV PUSH: CPT

## 2019-09-19 PROCEDURE — 82570 ASSAY OF URINE CREATININE: CPT

## 2019-09-19 PROCEDURE — 85014 HEMATOCRIT: CPT

## 2019-09-19 PROCEDURE — 84300 ASSAY OF URINE SODIUM: CPT

## 2019-09-19 PROCEDURE — P9016: CPT

## 2019-09-19 PROCEDURE — 84132 ASSAY OF SERUM POTASSIUM: CPT

## 2019-09-19 PROCEDURE — 85652 RBC SED RATE AUTOMATED: CPT

## 2019-09-19 PROCEDURE — 84295 ASSAY OF SERUM SODIUM: CPT

## 2019-09-19 PROCEDURE — 80202 ASSAY OF VANCOMYCIN: CPT

## 2019-09-19 PROCEDURE — 80307 DRUG TEST PRSMV CHEM ANLYZR: CPT

## 2019-09-19 PROCEDURE — P9047: CPT

## 2019-09-19 PROCEDURE — 82607 VITAMIN B-12: CPT

## 2019-09-19 PROCEDURE — 86900 BLOOD TYPING SEROLOGIC ABO: CPT

## 2019-09-19 PROCEDURE — 80053 COMPREHEN METABOLIC PANEL: CPT

## 2019-09-19 PROCEDURE — 83735 ASSAY OF MAGNESIUM: CPT

## 2019-09-19 PROCEDURE — 85730 THROMBOPLASTIN TIME PARTIAL: CPT

## 2019-09-19 PROCEDURE — 82330 ASSAY OF CALCIUM: CPT

## 2019-09-19 PROCEDURE — 87641 MR-STAPH DNA AMP PROBE: CPT

## 2019-09-19 PROCEDURE — 83690 ASSAY OF LIPASE: CPT

## 2019-09-19 PROCEDURE — 82728 ASSAY OF FERRITIN: CPT

## 2019-09-19 PROCEDURE — 82803 BLOOD GASES ANY COMBINATION: CPT

## 2019-09-19 PROCEDURE — 84443 ASSAY THYROID STIM HORMONE: CPT

## 2019-09-19 PROCEDURE — 82947 ASSAY GLUCOSE BLOOD QUANT: CPT

## 2019-09-19 PROCEDURE — 84540 ASSAY OF URINE/UREA-N: CPT

## 2019-09-19 PROCEDURE — 36430 TRANSFUSION BLD/BLD COMPNT: CPT

## 2019-09-19 PROCEDURE — 86901 BLOOD TYPING SEROLOGIC RH(D): CPT

## 2019-09-19 PROCEDURE — 84466 ASSAY OF TRANSFERRIN: CPT

## 2019-09-19 PROCEDURE — 86140 C-REACTIVE PROTEIN: CPT

## 2019-09-19 PROCEDURE — 86923 COMPATIBILITY TEST ELECTRIC: CPT

## 2019-09-19 PROCEDURE — 87086 URINE CULTURE/COLONY COUNT: CPT

## 2019-09-19 PROCEDURE — 83935 ASSAY OF URINE OSMOLALITY: CPT

## 2019-09-19 PROCEDURE — 85610 PROTHROMBIN TIME: CPT

## 2019-09-19 PROCEDURE — 71045 X-RAY EXAM CHEST 1 VIEW: CPT

## 2019-09-19 PROCEDURE — 71275 CT ANGIOGRAPHY CHEST: CPT

## 2019-09-19 PROCEDURE — 83550 IRON BINDING TEST: CPT

## 2019-09-19 PROCEDURE — 82962 GLUCOSE BLOOD TEST: CPT

## 2019-09-19 PROCEDURE — 86850 RBC ANTIBODY SCREEN: CPT

## 2019-09-19 PROCEDURE — 80048 BASIC METABOLIC PNL TOTAL CA: CPT

## 2019-09-19 PROCEDURE — 83540 ASSAY OF IRON: CPT

## 2019-09-19 PROCEDURE — 83036 HEMOGLOBIN GLYCOSYLATED A1C: CPT

## 2019-09-19 PROCEDURE — 97167 OT EVAL HIGH COMPLEX 60 MIN: CPT

## 2019-09-19 PROCEDURE — 36415 COLL VENOUS BLD VENIPUNCTURE: CPT

## 2019-09-19 PROCEDURE — 85027 COMPLETE CBC AUTOMATED: CPT

## 2019-09-19 PROCEDURE — 88311 DECALCIFY TISSUE: CPT

## 2019-09-19 PROCEDURE — 87640 STAPH A DNA AMP PROBE: CPT

## 2019-09-19 PROCEDURE — 81001 URINALYSIS AUTO W/SCOPE: CPT

## 2019-09-19 PROCEDURE — 84436 ASSAY OF TOTAL THYROXINE: CPT

## 2019-09-19 PROCEDURE — 84100 ASSAY OF PHOSPHORUS: CPT

## 2019-09-19 PROCEDURE — 82435 ASSAY OF BLOOD CHLORIDE: CPT

## 2019-09-19 PROCEDURE — 83605 ASSAY OF LACTIC ACID: CPT

## 2019-09-19 PROCEDURE — 99291 CRITICAL CARE FIRST HOUR: CPT | Mod: 25

## 2019-09-19 PROCEDURE — 82550 ASSAY OF CK (CPK): CPT

## 2019-09-19 PROCEDURE — 87040 BLOOD CULTURE FOR BACTERIA: CPT

## 2019-09-19 PROCEDURE — 93306 TTE W/DOPPLER COMPLETE: CPT

## 2019-09-19 PROCEDURE — 88307 TISSUE EXAM BY PATHOLOGIST: CPT

## 2019-09-19 PROCEDURE — 73610 X-RAY EXAM OF ANKLE: CPT

## 2019-09-19 PROCEDURE — 84484 ASSAY OF TROPONIN QUANT: CPT

## 2019-09-19 PROCEDURE — 73630 X-RAY EXAM OF FOOT: CPT

## 2019-09-19 PROCEDURE — 85045 AUTOMATED RETICULOCYTE COUNT: CPT

## 2019-10-10 LAB — SURGICAL PATHOLOGY STUDY: SIGNIFICANT CHANGE UP

## 2020-07-08 NOTE — PROGRESS NOTE ADULT - ASSESSMENT
Pt had dental implant surgery done this morning. 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 Pt admitted with severe sepsis secondary to wet gangrene of RLE, complicated by maggot infestation of wound. Vascular sx consulted and pt s/p wound cleansing and went to the OR for emergent source control and s/p  Guillotine R BKA. Post operatively day #1 remains stable. Sepsis resolved post sx. Empirically on vanco/ zosyn, Bcx negative. Hospital course complicated by acute on chronic anemia in the setting of AOCD, s/p 2 u prbc on admission and 1 u prbc intraop with appropriate response to hgb, remains stable thereafter. FOBT pending but no evidence of active bleeding. Also noted with BRANT/ hyponatremia likely due to dehydration/sepsis, s/p ivf fluid resuscitation with improving renal function and sodium. Slow clinical improvement. ID, Podiatry, Vascular, PM and Psych consults noted.     Severe sepsis 2/2 wet gangrene of the RLE with maggot infestation  - s/p emergent source control and s/p Guillotine BKA POD #4  - no intra op complications   - currently with down trending leukocytosis to wnl   - elevated esr/ crp   - afebrile  and normotensive - B cx negative X 2  - c/w Zosyn 3.375 g IV q8h and vancomycin now q12h   D# 5    -vanco trough per pharmacy   - pain control as per pain mx, no role for suboxone until off of acute narcotics   - will c/w Tylenol 975mg TID, ATC x 2 days, c/w  Dilaudid 4mg PO q4hrs PRN moderate pain  - c/w Dilaudid 6mg PO q4hrs PRN severe pain   - c/w Dilaudid 1mg subcutaneous q6hrs PRN severe pain persisting 1 hour after oral opioids  - c/w Robaxin 500mg PO q6hrs ATC  - c/w toradol 15 mg IV q8hrs prn   - pt will not receive IV narcotics only sq given prior abuse   - c/w colace for prn constipation   - s/p tran post op   - c/w local wound care as per vascular sx   - ID f/u noted and appreciated, d/w Dr. Santa the plan of care. Pt will c/w abx until 4 days s/p BKA revision.   - vascular sx f/u  noted and appreciated. RTOR next week for possible revision vs wound vac placement and future skin grafting. C/w elevation and immobilizer to prevent contracture.   - Podiatry consult noted and appreciated   - Pain mx consult noted, avoid narcotics in IV form   - Psych consult noted and appreciated, will re consult due to concerns for depression and also when ready to transition pt to suboxone     Sinus tachycardia  - Pt denies CP, SOB. Repeat EKG done today sinus tachycardia. Unchanged from ekg on admission and 8/3  - On admission - sinus tachycardia w/ T wave inversion in leads V4-6 now with controlled HR initially likely due to sepsis   - admission Trop neg X2   - no evidence of any issues on tele so discontinued   - repeat Troponin 8/4 x 1 - negative  - TTE: Normal LVSF, EF 65-70; No right heart strain; Mild Pul HTN; mod enlarged LA  -started on low dose bb   - f/u CTA chest w/ IV contrast - pt high risk for PE  - cardiology was consulted on admission but has not consulted    BRANT - resolved  - likely secondary to dehydration with associated hyponatremia   -Improving renal fxn to wnl  -improving sodium to 134  - toleraing PO intake, D/c IVF  - monitor renal function post IV contrast   - will c/w monitoring renal fxn and sodium closely     B/l groin intertriginous dermatitis  -C/w Nystatin powder bid  -Continue to monitor for signs of infection     Acute on Chronic Anemia   - noted AOCD   - h/h remains stable   -Initial Hb 8.3, downtrended to 6.4   - likely bc pt was initially dehydrated   -s/p 2U PRBCs on admision and 1 u prbc intra op   - appropriate response to prbc transfusion   - FOBT pending, no evidence of bleeding   -Iron panel suggest AOCD   - will monitor hgb and transfuse for hgb <7   -C/w iron supplement, - c/w vitamin c     IDDM-2 poorly controlled  -HbA1c 7  - fs stable  - c/w accuchecks ACHS TID  - c/w HSS  - c/w hypoglycemia protocol     HLD  -C/W LIPITOR PO QHS     H/o polysubstance use disorder - currently taking Suboxone  -Utox negative  -Last dose of Suboxone was day prior to hospitalization   -currently pt requiring narcotics for pain control   - labile emotions - psych reconsult for signs of depression  -Psych consult noted and appreciated, will reconsult when pt is off narcotics or defer to outpt management     Preventative Measures  DVT ppx: started lovenox 40 - benefits outweigh risk. s/p BKA, immobile, sedentary, platelets stable, renal functions wnl.    Advanced Directive: Full code  Next of kin: Brother Jose    Dispo: Pt s/p Right BKA, plan for revision of bka , patient continues to be tachy, CTA ordered  to r/o PE. Pending [facility] consult for possible depression. 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 Pt admitted with severe sepsis secondary to wet gangrene of RLE, complicated by maggot infestation of wound. Vascular sx consulted and pt s/p wound cleansing and went to the OR for emergent source control and s/p  Guillotine R BKA. Post operatively day #1 remains stable. Sepsis resolved post sx. Empirically on vanco/ zosyn, Bcx negative. Hospital course complicated by acute on chronic anemia in the setting of AOCD, s/p 2 u prbc on admission and 1 u prbc intraop with appropriate response to hgb, remains stable thereafter. FOBT pending but no evidence of active bleeding. Also noted with BRANT/ hyponatremia likely due to dehydration/sepsis, s/p ivf fluid resuscitation with improving renal function and sodium. Slow clinical improvement. ID, Podiatry, Vascular, PM and Psych consults noted.     Severe sepsis 2/2 wet gangrene of the RLE with maggot infestation  - s/p emergent source control and s/p Guillotine BKA POD #4  - no intra op complications   - currently with down trending leukocytosis to wnl   - elevated esr/ crp   - afebrile  and normotensive - B cx negative X 2  - c/w Zosyn 3.375 g IV q8h and vancomycin now q12h   D# 5    -vanco trough per pharmacy   - pain control as per pain mx, no role for suboxone until off of acute narcotics   - will c/w Tylenol 975mg TID, ATC x 2 days, c/w  Dilaudid 4mg PO q4hrs PRN moderate pain  - c/w Dilaudid 6mg PO q4hrs PRN severe pain   - c/w Dilaudid 1mg subcutaneous q6hrs PRN severe pain persisting 1 hour after oral opioids  - c/w Robaxin 500mg PO q6hrs ATC  - c/w toradol 15 mg IV q8hrs prn   - pt will not receive IV narcotics only sq given prior abuse   - c/w colace for prn constipation   - s/p tran post op   - c/w local wound care as per vascular sx   - ID f/u noted and appreciated, d/w Dr. Santa the plan of care. Pt will c/w abx until 4 days s/p BKA revision - 8/8  - vascular sx f/u  noted and appreciated. RTOR on 8/7 for possible revision vs wound vac placement and future skin grafting. C/w elevation and immobilizer to prevent contracture.   - Podiatry consult noted and appreciated   - Pain mx consult noted, avoid narcotics in IV form   - Psych consult noted and appreciated, will re consult due to concerns for depression and also when ready to transition pt to suboxone     Sinus tachycardia  - Pt denies CP, SOB. Repeat EKG done today sinus tachycardia. Unchanged from ekg on admission and 8/3  - On admission - sinus tachycardia w/ T wave inversion in leads V4-6 now with controlled HR initially likely due to sepsis   - admission Trop neg X2   - no evidence of any issues on tele so discontinued   - repeat Troponin 8/4 x 1 - negative  - TTE: Normal LVSF, EF 65-70; No right heart strain; Mild Pul HTN; mod enlarged LA  -started on low dose bb   - Pending CTA chest w/ IV contrast - pt high risk for PE  - cardiology was consulted on admission but has not consulted    BRANT - resolved  - likely secondary to dehydration with associated hyponatremia   -Improving renal fxn to wnl  -improving sodium to 134  - toleraing PO intake, D/c IVF  - monitor renal function post IV contrast   - will c/w monitoring renal fxn and sodium closely     B/l groin intertriginous dermatitis  -C/w Nystatin powder bid  -Continue to monitor for signs of infection     Acute on Chronic Anemia   - noted AOCD   - h/h remains stable   -Initial Hb 8.3, downtrended to 6.4   - likely bc pt was initially dehydrated   -s/p 2U PRBCs on admision and 1 u prbc intra op   - appropriate response to prbc transfusion   - FOBT pending, no evidence of bleeding   -Iron panel suggest AOCD   - will monitor hgb and transfuse for hgb <7   -C/w iron supplement, - c/w vitamin c     IDDM-2 poorly controlled  -HbA1c 7  - fs stable  - c/w accuchecks ACHS TID  - c/w HSS  - c/w hypoglycemia protocol     HLD  -C/W LIPITOR PO QHS     H/o polysubstance use disorder - currently taking Suboxone  -Utox negative  -Last dose of Suboxone was day prior to hospitalization   -currently pt requiring narcotics for pain control   - labile emotions - psych reconsult for signs of depression  -Psych consult noted and appreciated, will reconsult when pt is off narcotics or defer to outpt management     Preventative Measures  DVT ppx: started lovenox 40 - benefits outweigh risk. s/p BKA, immobile, sedentary, platelets stable, renal functions wnl.    Advanced Directive: Full code  Next of kin: Brother Jose    Dispo: Pt s/p Right BKA, plan for revision of bka 8/7, patient continues to be tachy, CTA ordered  to r/o PE. Pending [facility] consult for possible depression.
